# Patient Record
Sex: MALE | Race: BLACK OR AFRICAN AMERICAN | NOT HISPANIC OR LATINO | URBAN - METROPOLITAN AREA
[De-identification: names, ages, dates, MRNs, and addresses within clinical notes are randomized per-mention and may not be internally consistent; named-entity substitution may affect disease eponyms.]

---

## 2024-07-24 ENCOUNTER — HOSPITAL ENCOUNTER (EMERGENCY)
Facility: HOSPITAL | Age: 54
Discharge: ANOTHER HOSPITAL | End: 2024-07-24
Attending: STUDENT IN AN ORGANIZED HEALTH CARE EDUCATION/TRAINING PROGRAM

## 2024-07-24 VITALS
OXYGEN SATURATION: 99 % | TEMPERATURE: 98 F | HEART RATE: 64 BPM | SYSTOLIC BLOOD PRESSURE: 156 MMHG | DIASTOLIC BLOOD PRESSURE: 112 MMHG | WEIGHT: 246.25 LBS | RESPIRATION RATE: 23 BRPM | HEIGHT: 71 IN | BODY MASS INDEX: 34.48 KG/M2

## 2024-07-24 DIAGNOSIS — I47.10 SVT (SUPRAVENTRICULAR TACHYCARDIA): ICD-10-CM

## 2024-07-24 DIAGNOSIS — I49.01 VENTRICULAR FIBRILLATION: ICD-10-CM

## 2024-07-24 DIAGNOSIS — R07.9 CHEST PAIN: Primary | ICD-10-CM

## 2024-07-24 LAB
ALBUMIN SERPL-MCNC: 4 G/DL (ref 3.5–5)
ALBUMIN/GLOB SERPL: 1.1 RATIO (ref 1.1–2)
ALP SERPL-CCNC: 99 UNIT/L (ref 40–150)
ALT SERPL-CCNC: 32 UNIT/L (ref 0–55)
ANION GAP SERPL CALC-SCNC: 14 MEQ/L
AST SERPL-CCNC: 42 UNIT/L (ref 5–34)
BACTERIA #/AREA URNS AUTO: ABNORMAL /HPF
BASOPHILS # BLD AUTO: 0.04 X10(3)/MCL
BASOPHILS NFR BLD AUTO: 0.7 %
BILIRUB SERPL-MCNC: 0.7 MG/DL
BILIRUB UR QL STRIP.AUTO: NEGATIVE
BUN SERPL-MCNC: 15.4 MG/DL (ref 8.4–25.7)
CALCIUM SERPL-MCNC: 9.7 MG/DL (ref 8.4–10.2)
CHLORIDE SERPL-SCNC: 105 MMOL/L (ref 98–107)
CLARITY UR: CLEAR
CO2 SERPL-SCNC: 20 MMOL/L (ref 22–29)
COLOR UR AUTO: ABNORMAL
CREAT SERPL-MCNC: 1.47 MG/DL (ref 0.73–1.18)
CREAT/UREA NIT SERPL: 10
EOSINOPHIL # BLD AUTO: 0.3 X10(3)/MCL (ref 0–0.9)
EOSINOPHIL NFR BLD AUTO: 5 %
ERYTHROCYTE [DISTWIDTH] IN BLOOD BY AUTOMATED COUNT: 13.4 % (ref 11.5–17)
GFR SERPLBLD CREATININE-BSD FMLA CKD-EPI: 56 ML/MIN/1.73/M2
GLOBULIN SER-MCNC: 3.8 GM/DL (ref 2.4–3.5)
GLUCOSE SERPL-MCNC: 135 MG/DL (ref 74–100)
GLUCOSE UR QL STRIP: NORMAL
HCT VFR BLD AUTO: 53.6 % (ref 42–52)
HGB BLD-MCNC: 17.6 G/DL (ref 14–18)
HGB UR QL STRIP: ABNORMAL
HOLD SPECIMEN: NORMAL
HOLD SPECIMEN: NORMAL
HYALINE CASTS #/AREA URNS LPF: ABNORMAL /LPF
IMM GRANULOCYTES # BLD AUTO: 0.03 X10(3)/MCL (ref 0–0.04)
IMM GRANULOCYTES NFR BLD AUTO: 0.5 %
INR PPP: 1
KETONES UR QL STRIP: NEGATIVE
LACTATE SERPL-SCNC: 1.9 MMOL/L (ref 0.5–2.2)
LEUKOCYTE ESTERASE UR QL STRIP: NEGATIVE
LYMPHOCYTES # BLD AUTO: 2.51 X10(3)/MCL (ref 0.6–4.6)
LYMPHOCYTES NFR BLD AUTO: 41.8 %
MAGNESIUM SERPL-MCNC: 2.2 MG/DL (ref 1.6–2.6)
MCH RBC QN AUTO: 30 PG (ref 27–31)
MCHC RBC AUTO-ENTMCNC: 32.8 G/DL (ref 33–36)
MCV RBC AUTO: 91.5 FL (ref 80–94)
MONOCYTES # BLD AUTO: 0.7 X10(3)/MCL (ref 0.1–1.3)
MONOCYTES NFR BLD AUTO: 11.7 %
MUCOUS THREADS URNS QL MICRO: ABNORMAL /LPF
NEUTROPHILS # BLD AUTO: 2.42 X10(3)/MCL (ref 2.1–9.2)
NEUTROPHILS NFR BLD AUTO: 40.3 %
NITRITE UR QL STRIP: NEGATIVE
NRBC BLD AUTO-RTO: 0 %
PH UR STRIP: 5.5 [PH]
PLATELET # BLD AUTO: 270 X10(3)/MCL (ref 130–400)
PMV BLD AUTO: 10.5 FL (ref 7.4–10.4)
POTASSIUM SERPL-SCNC: 3.8 MMOL/L (ref 3.5–5.1)
PROT SERPL-MCNC: 7.8 GM/DL (ref 6.4–8.3)
PROT UR QL STRIP: ABNORMAL
PROTHROMBIN TIME: 13.7 SECONDS (ref 11.4–14)
RBC # BLD AUTO: 5.86 X10(6)/MCL (ref 4.7–6.1)
RBC #/AREA URNS AUTO: ABNORMAL /HPF
SODIUM SERPL-SCNC: 139 MMOL/L (ref 136–145)
SP GR UR STRIP.AUTO: 1.02 (ref 1–1.03)
SQUAMOUS #/AREA URNS LPF: ABNORMAL /HPF
TROPONIN I SERPL-MCNC: <0.01 NG/ML (ref 0–0.04)
TSH SERPL-ACNC: 0.64 UIU/ML (ref 0.35–4.94)
UROBILINOGEN UR STRIP-ACNC: NORMAL
WBC # BLD AUTO: 6 X10(3)/MCL (ref 4.5–11.5)
WBC #/AREA URNS AUTO: ABNORMAL /HPF

## 2024-07-24 PROCEDURE — 83735 ASSAY OF MAGNESIUM: CPT | Performed by: STUDENT IN AN ORGANIZED HEALTH CARE EDUCATION/TRAINING PROGRAM

## 2024-07-24 PROCEDURE — 84443 ASSAY THYROID STIM HORMONE: CPT | Performed by: STUDENT IN AN ORGANIZED HEALTH CARE EDUCATION/TRAINING PROGRAM

## 2024-07-24 PROCEDURE — 83605 ASSAY OF LACTIC ACID: CPT | Performed by: STUDENT IN AN ORGANIZED HEALTH CARE EDUCATION/TRAINING PROGRAM

## 2024-07-24 PROCEDURE — 99291 CRITICAL CARE FIRST HOUR: CPT | Mod: 25

## 2024-07-24 PROCEDURE — 81001 URINALYSIS AUTO W/SCOPE: CPT | Performed by: STUDENT IN AN ORGANIZED HEALTH CARE EDUCATION/TRAINING PROGRAM

## 2024-07-24 PROCEDURE — 80053 COMPREHEN METABOLIC PANEL: CPT | Performed by: STUDENT IN AN ORGANIZED HEALTH CARE EDUCATION/TRAINING PROGRAM

## 2024-07-24 PROCEDURE — 84484 ASSAY OF TROPONIN QUANT: CPT | Performed by: STUDENT IN AN ORGANIZED HEALTH CARE EDUCATION/TRAINING PROGRAM

## 2024-07-24 PROCEDURE — 85025 COMPLETE CBC W/AUTO DIFF WBC: CPT | Performed by: STUDENT IN AN ORGANIZED HEALTH CARE EDUCATION/TRAINING PROGRAM

## 2024-07-24 PROCEDURE — 85610 PROTHROMBIN TIME: CPT | Performed by: STUDENT IN AN ORGANIZED HEALTH CARE EDUCATION/TRAINING PROGRAM

## 2024-07-24 PROCEDURE — 63600175 PHARM REV CODE 636 W HCPCS

## 2024-07-24 PROCEDURE — 96361 HYDRATE IV INFUSION ADD-ON: CPT | Mod: 59

## 2024-07-24 PROCEDURE — 92960 CARDIOVERSION ELECTRIC EXT: CPT | Mod: 59

## 2024-07-24 PROCEDURE — 93005 ELECTROCARDIOGRAM TRACING: CPT

## 2024-07-24 PROCEDURE — 63600175 PHARM REV CODE 636 W HCPCS: Performed by: STUDENT IN AN ORGANIZED HEALTH CARE EDUCATION/TRAINING PROGRAM

## 2024-07-24 PROCEDURE — 96374 THER/PROPH/DIAG INJ IV PUSH: CPT | Mod: 59

## 2024-07-24 PROCEDURE — 25000003 PHARM REV CODE 250: Performed by: STUDENT IN AN ORGANIZED HEALTH CARE EDUCATION/TRAINING PROGRAM

## 2024-07-24 RX ORDER — FENTANYL CITRATE 50 UG/ML
INJECTION, SOLUTION INTRAMUSCULAR; INTRAVENOUS
Status: DISCONTINUED
Start: 2024-07-24 | End: 2024-07-24 | Stop reason: HOSPADM

## 2024-07-24 RX ORDER — ASPIRIN 81 MG
81 TABLET,CHEWABLE ORAL
COMMUNITY
Start: 2024-04-16

## 2024-07-24 RX ORDER — CLOPIDOGREL BISULFATE 75 MG/1
75 TABLET ORAL DAILY
COMMUNITY
Start: 2024-04-16

## 2024-07-24 RX ORDER — ADENOSINE 3 MG/ML
INJECTION, SOLUTION INTRAVENOUS
Status: COMPLETED
Start: 2024-07-24 | End: 2024-07-24

## 2024-07-24 RX ORDER — ADENOSINE 3 MG/ML
6 INJECTION, SOLUTION INTRAVENOUS
Status: DISCONTINUED | OUTPATIENT
Start: 2024-07-24 | End: 2024-07-24 | Stop reason: HOSPADM

## 2024-07-24 RX ORDER — AMIODARONE HYDROCHLORIDE 150 MG/3ML
150 INJECTION, SOLUTION INTRAVENOUS
Status: COMPLETED | OUTPATIENT
Start: 2024-07-24 | End: 2024-07-24

## 2024-07-24 RX ORDER — ROSUVASTATIN CALCIUM 10 MG/1
10 TABLET, COATED ORAL DAILY
COMMUNITY
Start: 2024-04-16

## 2024-07-24 RX ORDER — LOSARTAN POTASSIUM AND HYDROCHLOROTHIAZIDE 25; 100 MG/1; MG/1
1 TABLET ORAL DAILY
COMMUNITY
Start: 2024-04-16

## 2024-07-24 RX ORDER — ADENOSINE 3 MG/ML
INJECTION, SOLUTION INTRAVENOUS
Status: DISCONTINUED
Start: 2024-07-24 | End: 2024-07-24 | Stop reason: HOSPADM

## 2024-07-24 RX ORDER — AMLODIPINE BESYLATE 10 MG/1
10 TABLET ORAL DAILY
COMMUNITY
Start: 2024-04-16

## 2024-07-24 RX ORDER — AMIODARONE HYDROCHLORIDE 200 MG/1
200 TABLET ORAL EVERY MORNING
COMMUNITY
Start: 2024-04-16

## 2024-07-24 RX ADMIN — SODIUM CHLORIDE 500 ML: 9 INJECTION, SOLUTION INTRAVENOUS at 05:07

## 2024-07-24 RX ADMIN — AMIODARONE HYDROCHLORIDE 1 MG/MIN: 1.8 INJECTION, SOLUTION INTRAVENOUS at 05:07

## 2024-07-24 RX ADMIN — AMIODARONE HYDROCHLORIDE 150 MG: 50 INJECTION, SOLUTION INTRAVENOUS at 05:07

## 2024-07-24 RX ADMIN — ADENOSINE 6 MG: 3 INJECTION, SOLUTION INTRAVENOUS at 05:07

## 2024-07-24 NOTE — CONSULTS
[facility] Medicine Wards Consult Note     Chief Complaint     Chest Pain (Pt with hx of MI presents diaphoretic with complaints of chest pain that started 30 minutes prior to arrival. EKG obtained showing HR >200. Pt bedded immediately, MD notified and at bedside. )      Subjective:      History of Present Illness:  Eliseo Denson is a 54 y.o.  male who with a history of CAD s/p stents 2/2 massive heart attack who presented to [facility] ED on 7/24/2024  with a primary complaint of Chest Pain (Pt with hx of MI presents diaphoretic with complaints of chest pain that started 30 minutes prior to arrival. EKG obtained showing HR >200. Pt bedded immediately, MD notified and at bedside. )    Patient has a history of a massive heart attack per patient.  Reports that he was getting out of work approximately 30 minutes prior to arrival when he suddenly felt his heart racing and associated left chest pain which he described as pressure-like and shortness of breath.  Absolute lasted for approximately 3-4 minutes before patient sat down and rested a bit.  While he was resting he still felt palpitations but the chest pain somewhat improved.  After resting for a couple minutes patient stood up to attempt to get to his car and symptoms started all over again.  At this point patient called his wife to bring him to [facility] ED. upon arrival to the ED patient was still experiencing symptoms and suddenly went into SVT with a rate of 260.  Patient received adenosine and subsequently became hypotensive with unstable vitals requiring cardioversion.  Almost immediately after cardioversion patient went into ventricular fibrillation and underwent approximately 10 seconds of CPR before ROSC.  Patient was started on an amiodarone drip and after this episode patient was vitally stable.  ED physician consulted Medicine for evaluation of whether patient can be safely admitted to this facility or if he needs to be transferred out.  Due to patient's history  as well as his sudden symptoms it was decided that for patient's safety he would benefit from being in a facility that has cardiovascular services overnight which our facility does not.  This was discussed with staff who agreed.      Past Medical History:  No past medical history on file.    Past Surgical History:  No past surgical history on file.    Family History:  No family history on file.    Social History:       Allergies:  Review of patient's allergies indicates:  Not on File    Home Medications:  Prior to Admission medications    Not on File         Review of Systems:  Review of Systems   Constitutional:  Negative for chills, diaphoresis and fever.   Respiratory:  Positive for shortness of breath.    Cardiovascular:  Positive for chest pain and palpitations.   Gastrointestinal:  Negative for constipation, diarrhea, nausea and vomiting.              Objective:   Last 24 Hour Vital Signs:  BP  Min: 123/98  Max: 142/91  Temp  Av °F (36.7 °C)  Min: 98 °F (36.7 °C)  Max: 98 °F (36.7 °C)  Pulse  Av.4  Min: 67  Max: 141  Resp  Av.9  Min: 16  Max: 33  SpO2  Av.4 %  Min: 97 %  Max: 100 %  Weight  Av.7 kg (246 lb 4.1 oz)  Min: 111.7 kg (246 lb 4.1 oz)  Max: 111.7 kg (246 lb 4.1 oz)  There is no height or weight on file to calculate BMI.  No intake/output data recorded.    Physical Examination:  Physical Exam  Gen gerardo: NAD  HEENT: EOMI, PERRL  CV: RRR  Resp: Clr breath sounds b/l, no increased work of breathing  Abdomen: Soft, Nontender, nondistended, no guarding, no rebound tenderness  Extremities: No edema  Neuro: GCS 15      Laboratory:  Most Recent Data:  CBC:   Lab Results   Component Value Date    WBC 6.00 2024    HGB 17.6 2024    HCT 53.6 (H) 2024     2024    MCV 91.5 2024    RDW 13.4 2024     WBC Differential:   Recent Labs   Lab 24  1806   WBC 6.00   HGB 17.6   HCT 53.6*      MCV 91.5       Trended Lab Data:  Recent Labs   Lab  "07/24/24  1806   WBC 6.00   HGB 17.6   HCT 53.6*      MCV 91.5   RDW 13.4       Trended Cardiac Data:  No results for input(s): "TROPONINI", "CKTOTAL", "CKMB", "BNP" in the last 168 hours.    Microbiology Data:  Microbiology Results (last 7 days)       ** No results found for the last 168 hours. **               Radiology:  Imaging Results              X-Ray Chest AP Portable (Final result)  Result time 07/24/24 17:41:09      Final result by Harry Layne MD (07/24/24 17:41:09)                   Impression:      No acute cardiopulmonary process identified.      Electronically signed by: Harry Layne  Date:    07/24/2024  Time:    17:41               Narrative:    EXAMINATION:  XR CHEST AP PORTABLE    CLINICAL HISTORY:  CHest pain;    TECHNIQUE:  One view    COMPARISON:  July 12, 2010.    FINDINGS:  Cardiopericardial silhouette appearance is similar.  Lungs are without dense focal or segmental consolidation, congestive process, pleural effusions or pneumothorax.                                           Assessment & Plan:     54-year-old male with a PMH of CAD status post stent after having suffered a massive MI per patient    -patient presented with concerning symptoms of shortness of breath, chest pain, severe palpitations  -became vitally unstable after receiving adenosine due to SVT with a rate of 260  -underwent cardioversion and converted into ventricular fibrillation and subsequently underwent 10 seconds of CPR prior to ROSC  -discussed patient with staff who agreed that patient would benefit from transfer to facility for higher level of care due to cardio vascular Services not being available at this facility in the event that patient has another cardiac event  -discussed with ED physician the reasoning for transfer and physician agreed patient will benefit from being transferred for higher level of care and Cardiovascular Services    Quinton Cao MD  [facility] HO-2          "

## 2024-07-24 NOTE — ED PROVIDER NOTES
Encounter Date: 7/24/2024       History     Chief Complaint   Patient presents with    Chest Pain     Pt with hx of MI presents diaphoretic with complaints of chest pain that started 30 minutes prior to arrival. EKG obtained showing HR >200. Pt bedded immediately, MD notified and at bedside.      Patient presents to the emergency department due to chest pain.  He reports it started 30 minutes prior to arrival.  Describes it as a heaviness and tightness in his chest and he also feels dizzy and short of breath.  Has a history of MI with stents in place.  Denies a history arrhythmias.    The history is provided by the patient.     Review of patient's allergies indicates:  Not on File  No past medical history on file.  No past surgical history on file.  No family history on file.     Review of Systems   Unable to perform ROS: Acuity of condition       Physical Exam     Initial Vitals   BP Pulse Resp Temp SpO2   07/24/24 1719 07/24/24 1713 07/24/24 1717 07/24/24 1748 07/24/24 1725   (!) 66/50 (!) 180 (!) 33 98 °F (36.7 °C) 100 %      MAP       --                Physical Exam    Nursing note and vitals reviewed.  Constitutional: He appears well-developed and well-nourished. He is diaphoretic. He appears distressed.   HENT:   Head: Normocephalic and atraumatic.   Eyes: Conjunctivae are normal. Pupils are equal, round, and reactive to light.   Neck: Neck supple.   Normal range of motion.  Cardiovascular:            Extremely tachycardic   Pulmonary/Chest: Breath sounds normal. No respiratory distress. He has no wheezes.   Abdominal: Abdomen is soft. There is no abdominal tenderness.   Musculoskeletal:         General: No edema. Normal range of motion.      Cervical back: Normal range of motion and neck supple.     Neurological: He is alert and oriented to person, place, and time.   Skin: Skin is warm.         ED Course   Cardioversion    Date/Time: 7/24/2024 6:08 PM  Location procedure was performed: [facility]    Performed by: Jesus Freeman MD  Authorized by: Jesus Freeman MD  Consent Done: Emergent Situation    Patient sedated: no  Cardioversion basis: emergent  Pre-procedure rhythm: supraventricular tachycardia  Patient position: patient was placed in a supine position  Chest area: chest area exposed  Electrodes: pads  Electrodes placed: anterior-lateral  Number of attempts: 1  Attempt 1 mode: synchronous  Attempt 1 shock (in Joules): 150  Attempt 1 outcome: conversion to ventricular fibrillation  Attempt 2 mode: synchronous  Attempt 2 shock (in Joules): 200  Attempt 2 outcome: conversion to normal sinus rhythm  Post-procedure rhythm: normal sinus rhythm  Complications: subsequent arrhythmia  Complications: No  Specimens: No  Implants: No      Critical Care    Date/Time: 7/24/2024 8:49 PM    Performed by: Jesus Freeman MD  Authorized by: Jesus Freeman MD  Direct patient critical care time: 16 minutes  Additional history critical care time: 5 minutes  Ordering / reviewing critical care time: 5 minutes  Documentation critical care time: 6 minutes  Consulting other physicians critical care time: 5 minutes  Consult with family critical care time: 0 minutes  Other critical care time: 0 minutes  Total critical care time (exclusive of procedural time) : 37 minutes  Critical care time was exclusive of separately billable procedures and treating other patients and teaching time.  Critical care was necessary to treat or prevent imminent or life-threatening deterioration of the following conditions: cardiac failure.  Critical care was time spent personally by me on the following activities: blood draw for specimens, development of treatment plan with patient or surrogate, discussions with consultants, interpretation of cardiac output measurements, evaluation of patient's response to treatment, examination of patient, obtaining history from patient or surrogate, ordering and performing treatments and  interventions, ordering and review of laboratory studies, ordering and review of radiographic studies, pulse oximetry and re-evaluation of patient's condition.        Labs Reviewed   COMPREHENSIVE METABOLIC PANEL - Abnormal       Result Value    Sodium 139      Potassium 3.8      Chloride 105      CO2 20 (*)     Glucose 135 (*)     Blood Urea Nitrogen 15.4      Creatinine 1.47 (*)     Calcium 9.7      Protein Total 7.8      Albumin 4.0      Globulin 3.8 (*)     Albumin/Globulin Ratio 1.1      Bilirubin Total 0.7      ALP 99      ALT 32      AST 42 (*)     eGFR 56      Anion Gap 14.0      BUN/Creatinine Ratio 10     URINALYSIS, REFLEX TO URINE CULTURE - Abnormal    Color, UA Light-Yellow      Appearance, UA Clear      Specific Gravity, UA 1.019      pH, UA 5.5      Protein, UA 2+ (*)     Glucose, UA Normal      Ketones, UA Negative      Blood, UA Trace (*)     Bilirubin, UA Negative      Urobilinogen, UA Normal      Nitrites, UA Negative      Leukocyte Esterase, UA Negative      RBC, UA 0-5      WBC, UA 0-5      Bacteria, UA None Seen      Squamous Epithelial Cells, UA None Seen      Mucous, UA Trace (*)     Hyaline Casts, UA 0-2 (*)    CBC WITH DIFFERENTIAL - Abnormal    WBC 6.00      RBC 5.86      Hgb 17.6      Hct 53.6 (*)     MCV 91.5      MCH 30.0      MCHC 32.8 (*)     RDW 13.4      Platelet 270      MPV 10.5 (*)     Neut % 40.3      Lymph % 41.8      Mono % 11.7      Eos % 5.0      Basophil % 0.7      Lymph # 2.51      Neut # 2.42      Mono # 0.70      Eos # 0.30      Baso # 0.04      IG# 0.03      IG% 0.5      NRBC% 0.0     MAGNESIUM - Normal    Magnesium Level 2.20     LACTIC ACID, PLASMA - Normal    Lactic Acid Level 1.9     TROPONIN I - Normal    Troponin-I <0.010     TSH - Normal    TSH 0.636     PROTIME-INR - Normal    PT 13.7      INR 1.0     CBC W/ AUTO DIFFERENTIAL    Narrative:     The following orders were created for panel order CBC auto differential.  Procedure                                Abnormality         Status                     ---------                               -----------         ------                     CBC with Differential[[id number]]       Abnormal            Final result                 Please view results for these tests on the individual orders.   EXTRA TUBES    Narrative:     The following orders were created for panel order EXTRA TUBES.  Procedure                               Abnormality         Status                     ---------                               -----------         ------                     Red Top Hold[[id number]]                                    Final result               Gold Top Hold[[id number]]                                   Final result                 Please view results for these tests on the individual orders.   RED TOP HOLD    Extra Tube Hold for add-ons.     GOLD TOP HOLD    Extra Tube Hold for add-ons.       EKG Readings: (Independently Interpreted)   Initial Reading: No STEMI. Rhythm: Supraventricular Tachycardia (SVT). Heart Rate: 274. Ectopy: No Ectopy. Conduction: Normal. Axis: Left Axis Deviation.     ECG Results              EKG 12-lead (Chest Pain) Age >30 (In process)        Collection Time Result Time QRS Duration [facility] QTC Calculation    07/24/24 17:23:43 07/24/24 17:55:30 112 446                     In process by Interface, Lab In [facility] (07/24/24 17:55:40)                   Narrative:    Test Reason : R07.9,    Vent. Rate : 074 BPM     Atrial Rate : 074 BPM     P-R Int : 198 ms          QRS Dur : 112 ms      QT Int : 402 ms       P-R-T Axes : -05 -77 087 degrees     QTc Int : 446 ms    Sinus rhythm with frequent Premature ventricular complexes  Left anterior fascicular block  Possible Anterior infarct (cited on or before 24-JUL-2024)  ST and T wave abnormality, consider lateral ischemia  Abnormal ECG  When compared with ECG of 24-JUL-2024 17:07,  Significant changes have occurred    Referred By:             Confirmed By:                        In process by Interface, Lab In [facility] (07/24/24 17:55:12)                   Narrative:    Test Reason : R07.9,    Vent. Rate : 074 BPM     Atrial Rate : 074 BPM     P-R Int : 198 ms          QRS Dur : 112 ms      QT Int : 402 ms       P-R-T Axes : -05 -77 087 degrees     QTc Int : 446 ms    Sinus rhythm with frequent Premature ventricular complexes  Left anterior fascicular block  Possible Anterior infarct (cited on or before 24-JUL-2024)  ST and T wave abnormality, consider lateral ischemia  Abnormal ECG  When compared with ECG of 24-JUL-2024 17:07,  Significant changes have occurred    Referred By:             Confirmed By:                                      EKG 12-lead (In process)        Collection Time Result Time QRS Duration [facility]HS QTC Calculation    07/24/24 17:07:55 07/24/24 17:55:27 82 380                     In process by Interface, Lab In [facility] (07/24/24 17:55:32)                   Narrative:    Test Reason : R07.9,    Vent. Rate : 274 BPM     Atrial Rate : 277 BPM     P-R Int : 000 ms          QRS Dur : 082 ms      QT Int : 178 ms       P-R-T Axes : 000 -60 129 degrees     QTc Int : 380 ms    Supraventricular tachycardia  Left axis deviation  LVH with repolarization abnormality  Inferior infarct ,age undetermined  Anteroseptal infarct ,age undetermined  Abnormal ECG  No previous ECGs available    Referred By:             Confirmed By:                       In process by Interface, Lab In [facility] (07/24/24 17:54:56)                   Narrative:    Test Reason : R07.9,    Vent. Rate : 274 BPM     Atrial Rate : 277 BPM     P-R Int : 000 ms          QRS Dur : 082 ms      QT Int : 178 ms       P-R-T Axes : 000 -60 129 degrees     QTc Int : 380 ms    Supraventricular tachycardia  Left axis deviation  LVH with repolarization abnormality  Inferior infarct ,age undetermined  Anteroseptal infarct ,age undetermined  Abnormal ECG  No previous ECGs available    Referred By:             Confirmed By:                                    Imaging Results              X-Ray Chest AP Portable (Final result)  Result time 07/24/24 17:41:09      Final result by Harry Layne MD (07/24/24 17:41:09)                   Impression:      No acute cardiopulmonary process identified.      Electronically signed by: Harry Layne  Date:    07/24/2024  Time:    17:41               Narrative:    EXAMINATION:  XR CHEST AP PORTABLE    CLINICAL HISTORY:  CHest pain;    TECHNIQUE:  One view    COMPARISON:  July 12, 2010.    FINDINGS:  Cardiopericardial silhouette appearance is similar.  Lungs are without dense focal or segmental consolidation, congestive process, pleural effusions or pneumothorax.                                       Medications   fentaNYL (SUBLIMAZE) 50 mcg/mL injection (  Not Given 7/24/24 1730)   amiodarone 360 mg/200 mL (1.8 mg/mL) infusion ( Intravenous Canceled Entry 7/24/24 1730)   adenosine injection 6 mg ( Intravenous Canceled Entry 7/24/24 1730)   adenosine (ADENOCARD) 3 mg/mL injection (  Canceled Entry 7/24/24 1730)   amiodarone 360 mg/200 mL (1.8 mg/mL) infusion (1 mg/min Intravenous New Bag 7/24/24 1725)   amiodarone injection 150 mg (150 mg Intravenous Given 7/24/24 1720)   sodium chloride 0.9% bolus (0 mLs Intravenous Stopped 7/24/24 1814)     Medical Decision Making  On arrival patient appeared to be in SVT per EKGs it was stable vital signs.  Initially tried 6 mg of adenosine, unsuccessful and shortly afterwards patient became hypotensive and lethargic.  Emergent synchronized cardioversion performed due to patient being unstable.  This resulted in VFib arrest, chest compressions were initiated, the patient was quickly defibrillated with 200 joules, resulting in normal sinus rhythm.  The patient returned to his mental baseline as well and blood pressure was stable.  He denies any symptoms after the event..  Repeat EKG shows sinus rhythm with no ST elevations.  CBC, CMP reassuring.  Mag within normal  limits.  Troponin within normal limits.  Chest x-ray is clear.  He received IV amiodarone bolus and was initiated on amiodarone drip.  Discussed the patient with Internal Medicine at our facility, Internal Medicine recommended transfer due to no cardiology services available overnight if he would need an emergent procedure.  Patient was accepted as a transfer by Dr. Alcocer to [facility].    Amount and/or Complexity of Data Reviewed  Labs: ordered. Decision-making details documented in ED Course.  Radiology: ordered. Decision-making details documented in ED Course.  ECG/medicine tests: ordered and independent interpretation performed. Decision-making details documented in ED Course.    Risk  Prescription drug management.                                      Clinical Impression:  Final diagnoses:  [R07.9] Chest pain (Primary)  [I47.10] SVT (supraventricular tachycardia)  [I49.01] Ventricular fibrillation          ED Disposition Condition    Transfer to Another Facility Jesus Ray MD  07/24/24 1124

## 2024-07-25 LAB
OHS QRS DURATION: 112 MS
OHS QRS DURATION: 82 MS
OHS QTC CALCULATION: 380 MS
OHS QTC CALCULATION: 446 MS

## 2024-08-23 ENCOUNTER — HOSPITAL ENCOUNTER (INPATIENT)
Facility: HOSPITAL | Age: 54
LOS: 1 days | Discharge: ANOTHER HOSPITAL | DRG: 314 | End: 2024-08-27
Attending: EMERGENCY MEDICINE | Admitting: INTERNAL MEDICINE

## 2024-08-23 DIAGNOSIS — I50.9 CONGESTIVE HEART FAILURE, UNSPECIFIED HF CHRONICITY, UNSPECIFIED HEART FAILURE TYPE: Primary | ICD-10-CM

## 2024-08-23 DIAGNOSIS — R07.9 CHEST PAIN: ICD-10-CM

## 2024-08-23 DIAGNOSIS — R06.02 SOB (SHORTNESS OF BREATH): ICD-10-CM

## 2024-08-23 DIAGNOSIS — I50.9 ACUTE EXACERBATION OF CHF (CONGESTIVE HEART FAILURE): ICD-10-CM

## 2024-08-23 DIAGNOSIS — R78.81 STAPHYLOCOCCUS AUREUS BACTEREMIA: ICD-10-CM

## 2024-08-23 DIAGNOSIS — I50.23 ACUTE ON CHRONIC SYSTOLIC CONGESTIVE HEART FAILURE: ICD-10-CM

## 2024-08-23 DIAGNOSIS — B95.61 STAPHYLOCOCCUS AUREUS BACTEREMIA: ICD-10-CM

## 2024-08-23 DIAGNOSIS — R06.00 DYSPNEA: ICD-10-CM

## 2024-08-23 LAB
A-ADO2 BLOOD GAS (OHS): 109 MMHG
ACINETOBACTER CALCOACETICUS-BAUMANNII COMPLEX (OHS): NOT DETECTED
ALBUMIN SERPL-MCNC: 3.1 G/DL (ref 3.5–5)
ALBUMIN/GLOB SERPL: 0.8 RATIO (ref 1.1–2)
ALLENS TEST BLOOD GAS (OHS): YES
ALP SERPL-CCNC: 91 UNIT/L (ref 40–150)
ALT SERPL-CCNC: 23 UNIT/L (ref 0–55)
AMPHET UR QL SCN: NEGATIVE
ANION GAP SERPL CALC-SCNC: 8 MEQ/L
APICAL FOUR CHAMBER EJECTION FRACTION: 33 %
APICAL TWO CHAMBER EJECTION FRACTION: 18 %
AST SERPL-CCNC: 19 UNIT/L (ref 5–34)
AV INDEX (PROSTH): 0.73
AV MEAN GRADIENT: 3 MMHG
AV PEAK GRADIENT: 6 MMHG
AV VALVE AREA BY VELOCITY RATIO: 2.9 CM²
AV VALVE AREA: 2.95 CM²
AV VELOCITY RATIO: 0.72
B PERT.PT PRMT NPH QL NAA+NON-PROBE: NOT DETECTED
BACTEROIDES FRAGILIS (OHS): NOT DETECTED
BARBITURATE SCN PRESENT UR: NEGATIVE
BASE EXCESS BLD CALC-SCNC: 0.5 MMOL/L (ref -2–2)
BASOPHILS # BLD AUTO: 0.02 X10(3)/MCL
BASOPHILS NFR BLD AUTO: 0.2 %
BENZODIAZ UR QL SCN: NEGATIVE
BILIRUB SERPL-MCNC: 1 MG/DL
BLOOD GAS SAMPLE TYPE (OHS): ABNORMAL
BNP BLD-MCNC: 962.2 PG/ML
BSA FOR ECHO PROCEDURE: 2.26 M2
BUN SERPL-MCNC: 11.5 MG/DL (ref 8.4–25.7)
C AURIS DNA BLD POS QL NAA+NON-PROBE: NOT DETECTED
C GATTII+NEOFOR DNA CSF QL NAA+NON-PROBE: NOT DETECTED
C PNEUM DNA NPH QL NAA+NON-PROBE: NOT DETECTED
CALCIUM SERPL-MCNC: 9.4 MG/DL (ref 8.4–10.2)
CANDIDA ALBICANS (OHS): NOT DETECTED
CANDIDA GLABRATA (OHS): NOT DETECTED
CANDIDA KRUSEI (OHS): NOT DETECTED
CANDIDA PARAPSILOSIS (OHS): NOT DETECTED
CANDIDA TROPICALIS (OHS): NOT DETECTED
CANNABINOIDS UR QL SCN: POSITIVE
CHLORIDE SERPL-SCNC: 107 MMOL/L (ref 98–107)
CK MB SERPL-MCNC: <1 NG/ML
CK SERPL-CCNC: 108 U/L (ref 30–200)
CO2 BLDA-SCNC: 25.7 MMOL/L (ref 22–26)
CO2 SERPL-SCNC: 22 MMOL/L (ref 22–29)
COCAINE UR QL SCN: NEGATIVE
COHGB MFR BLDA: 2.7 % (ref 0.5–1.5)
CREAT SERPL-MCNC: 1 MG/DL (ref 0.73–1.18)
CREAT/UREA NIT SERPL: 12
CTX-M (OHS): ABNORMAL
CV ECHO LV RWT: 0.31 CM
D DIMER PPP IA.FEU-MCNC: 1.22 UG/ML FEU (ref 0–0.5)
DOP CALC AO PEAK VEL: 1.24 M/S
DOP CALC AO VTI: 21.1 CM
DOP CALC LVOT AREA: 4 CM2
DOP CALC LVOT DIAMETER: 2.27 CM
DOP CALC LVOT PEAK VEL: 0.89 M/S
DOP CALC LVOT STROKE VOLUME: 62.29 CM3
DOP CALC MV VTI: 17.8 CM
DOP CALCLVOT PEAK VEL VTI: 15.4 CM
DRAWN BY BLOOD GAS (OHS): ABNORMAL
E WAVE DECELERATION TIME: 192.62 MSEC
E/A RATIO: 0.69
E/E' RATIO: 12.29 M/S
ECHO LV POSTERIOR WALL: 1.07 CM (ref 0.6–1.1)
ENTEROBACTER CLOACAE COMPLEX (OHS): NOT DETECTED
ENTEROBACTERALES (OHS): NOT DETECTED
ENTEROCOCCUS FAECALIS (OHS): NOT DETECTED
ENTEROCOCCUS FAECIUM (OHS): NOT DETECTED
EOSINOPHIL # BLD AUTO: 0.04 X10(3)/MCL (ref 0–0.9)
EOSINOPHIL NFR BLD AUTO: 0.3 %
ERYTHROCYTE [DISTWIDTH] IN BLOOD BY AUTOMATED COUNT: 13.4 % (ref 11.5–17)
ESCHERICHIA COLI (OHS): NOT DETECTED
EST. AVERAGE GLUCOSE BLD GHB EST-MCNC: 116.9 MG/DL
FENTANYL UR QL SCN: NEGATIVE
FLUAV AG UPPER RESP QL IA.RAPID: NOT DETECTED
FLUAV AG UPPER RESP QL IA.RAPID: NOT DETECTED
FLUBV AG UPPER RESP QL IA.RAPID: NOT DETECTED
FLUBV AG UPPER RESP QL IA.RAPID: NOT DETECTED
FRACTIONAL SHORTENING: 13 % (ref 28–44)
GAS PNL BLD: 182 MMHG
GFR SERPLBLD CREATININE-BSD FMLA CKD-EPI: >60 ML/MIN/1.73/M2
GLOBULIN SER-MCNC: 3.7 GM/DL (ref 2.4–3.5)
GLUCOSE SERPL-MCNC: 163 MG/DL (ref 74–100)
GP B STREP DNA CSF QL NAA+NON-PROBE: NOT DETECTED
HADV DNA NPH QL NAA+NON-PROBE: NOT DETECTED
HAEM INFLU DNA CSF QL NAA+NON-PROBE: NOT DETECTED
HBA1C MFR BLD: 5.7 %
HCO3 BLDA-SCNC: 24.6 MMOL/L (ref 22–26)
HCOV 229E RNA NPH QL NAA+NON-PROBE: NOT DETECTED
HCOV HKU1 RNA NPH QL NAA+NON-PROBE: NOT DETECTED
HCOV NL63 RNA NPH QL NAA+NON-PROBE: NOT DETECTED
HCOV OC43 RNA NPH QL NAA+NON-PROBE: NOT DETECTED
HCT VFR BLD AUTO: 42.1 % (ref 42–52)
HGB BLD-MCNC: 14.5 G/DL (ref 14–18)
HMPV RNA NPH QL NAA+NON-PROBE: NOT DETECTED
HOLD SPECIMEN: NORMAL
HPIV1 RNA NPH QL NAA+NON-PROBE: NOT DETECTED
HPIV2 RNA NPH QL NAA+NON-PROBE: NOT DETECTED
HPIV3 RNA NPH QL NAA+NON-PROBE: NOT DETECTED
HPIV4 RNA NPH QL NAA+NON-PROBE: NOT DETECTED
HR MV ECHO: 65 BPM
IMM GRANULOCYTES # BLD AUTO: 0.04 X10(3)/MCL (ref 0–0.04)
IMM GRANULOCYTES NFR BLD AUTO: 0.3 %
IMP (OHS): ABNORMAL
INHALED O2 CONCENTRATION: 32 %
INTERVENTRICULAR SEPTUM: 1.17 CM (ref 0.6–1.1)
KLEBSIELLA AEROGENES (OHS): NOT DETECTED
KLEBSIELLA OXYTOCA (OHS): NOT DETECTED
KLEBSIELLA PNEUMONIAE GROUP (OHS): NOT DETECTED
KPC (OHS): ABNORMAL
L MONOCYTOG DNA CSF QL NAA+NON-PROBE: NOT DETECTED
LACTATE SERPL-SCNC: 1.1 MMOL/L (ref 0.5–2.2)
LEFT ATRIUM SIZE: 4.5 CM
LEFT INTERNAL DIMENSION IN SYSTOLE: 5.94 CM (ref 2.1–4)
LEFT VENTRICLE DIASTOLIC VOLUME INDEX: 110 ML/M2
LEFT VENTRICLE DIASTOLIC VOLUME: 244.21 ML
LEFT VENTRICLE END DIASTOLIC VOLUME APICAL 2 CHAMBER: 147 ML
LEFT VENTRICLE END DIASTOLIC VOLUME APICAL 4 CHAMBER: 185.39 ML
LEFT VENTRICLE MASS INDEX: 162 G/M2
LEFT VENTRICLE SYSTOLIC VOLUME INDEX: 79.1 ML/M2
LEFT VENTRICLE SYSTOLIC VOLUME: 175.59 ML
LEFT VENTRICULAR INTERNAL DIMENSION IN DIASTOLE: 6.86 CM (ref 3.5–6)
LEFT VENTRICULAR MASS: 358.98 G
LPM (OHS): 3
LV LATERAL E/E' RATIO: 14.33 M/S
LV SEPTAL E/E' RATIO: 10.75 M/S
LVED V (TEICH): 244.21 ML
LVES V (TEICH): 175.59 ML
LVOT MG: 1.83 MMHG
LVOT MV: 0.63 CM/S
LYMPHOCYTES # BLD AUTO: 0.57 X10(3)/MCL (ref 0.6–4.6)
LYMPHOCYTES NFR BLD AUTO: 4.9 %
M PNEUMO DNA NPH QL NAA+NON-PROBE: NOT DETECTED
MAGNESIUM SERPL-MCNC: 1.8 MG/DL (ref 1.6–2.6)
MCH RBC QN AUTO: 31.4 PG (ref 27–31)
MCHC RBC AUTO-ENTMCNC: 34.4 G/DL (ref 33–36)
MCR-1 (OHS): ABNORMAL
MCV RBC AUTO: 91.1 FL (ref 80–94)
MDMA UR QL SCN: NEGATIVE
MECA/C (OHS): ABNORMAL
MECA/C AND MREJ (MRSA)(OHS): NOT DETECTED
METHGB MFR BLDA: 1.2 % (ref 0–1.5)
MONOCYTES # BLD AUTO: 0.85 X10(3)/MCL (ref 0.1–1.3)
MONOCYTES NFR BLD AUTO: 7.3 %
MRSA PCR SCRN (OHS): NOT DETECTED
MV MEAN GRADIENT: 1 MMHG
MV PEAK A VEL: 0.62 M/S
MV PEAK E VEL: 0.43 M/S
MV PEAK GRADIENT: 1 MMHG
MV STENOSIS PRESSURE HALF TIME: 55.86 MS
MV VALVE AREA BY CONTINUITY EQUATION: 3.5 CM2
MV VALVE AREA P 1/2 METHOD: 3.94 CM2
N MEN DNA CSF QL NAA+NON-PROBE: NOT DETECTED
NDM (OHS): ABNORMAL
NEUTROPHILS # BLD AUTO: 10.05 X10(3)/MCL (ref 2.1–9.2)
NEUTROPHILS NFR BLD AUTO: 87 %
NRBC BLD AUTO-RTO: 0 %
O2 HB BLOOD GAS (OHS): 93.5 % (ref 94–100)
OHS CV RV/LV RATIO: 0.49 CM
OHS LV EJECTION FRACTION SIMPSONS BIPLANE MOD: 28 %
OHS QRS DURATION: 116 MS
OHS QTC CALCULATION: 440 MS
OPIATES UR QL SCN: NEGATIVE
OXA-48-LIKE (OHS): ABNORMAL
OXYGEN DEVICE BLOOD GAS (OHS): ABNORMAL
OXYHGB MFR BLDA: 14.7 G/DL (ref 12–18)
PCO2 BLDA: 37 MMHG (ref 35–45)
PCP UR QL: NEGATIVE
PH BLDA: 7.43 [PH] (ref 7.35–7.45)
PH UR: 5 [PH] (ref 3–11)
PISA TR MAX VEL: 1.5 M/S
PLATELET # BLD AUTO: 252 X10(3)/MCL (ref 130–400)
PMV BLD AUTO: 9.7 FL (ref 7.4–10.4)
PO2 BLDA: 73 MMHG (ref 75–100)
POCT GLUCOSE: 182 MG/DL (ref 70–110)
POTASSIUM SERPL-SCNC: 3.7 MMOL/L (ref 3.5–5.1)
PROT SERPL-MCNC: 6.8 GM/DL (ref 6.4–8.3)
PROTEUS SPP. (OHS): NOT DETECTED
PSEUDOMONAS AERUGINOSA (OHS): NOT DETECTED
RA PRESSURE ESTIMATED: 3 MMHG
RBC # BLD AUTO: 4.62 X10(6)/MCL (ref 4.7–6.1)
RIGHT VENTRICULAR END-DIASTOLIC DIMENSION: 3.34 CM
RSV A 5' UTR RNA NPH QL NAA+PROBE: NOT DETECTED
RSV RNA NPH QL NAA+NON-PROBE: NOT DETECTED
RV TB RVSP: 5 MMHG
RV+EV RNA NPH QL NAA+NON-PROBE: NOT DETECTED
S ENT+BONG DNA STL QL NAA+NON-PROBE: NOT DETECTED
S PNEUM DNA CSF QL NAA+NON-PROBE: NOT DETECTED
SAMPLE SITE BLOOD GAS (OHS): ABNORMAL
SAO2 % BLDA: 97.3 %
SARS-COV-2 RNA RESP QL NAA+PROBE: NOT DETECTED
SARS-COV-2 RNA RESP QL NAA+PROBE: NOT DETECTED
SERRATIA MARCESCENS (OHS): NOT DETECTED
SODIUM SERPL-SCNC: 137 MMOL/L (ref 136–145)
SPECIFIC GRAVITY, URINE AUTO (.000) (OHS): 1.02 (ref 1–1.03)
STAPHYLOCOCCUS AUREUS (OHS): DETECTED
STAPHYLOCOCCUS EPIDERMIDIS (OHS): NOT DETECTED
STAPHYLOCOCCUS LUGDUNENSIS (OHS): NOT DETECTED
STAPHYLOCOCCUS SPP. (OHS): DETECTED
STENOTROPHOMONAS MALTOPHILIA (OHS): NOT DETECTED
STREPTOCOCCUS PYOGENES (GROUP A)(OHS): NOT DETECTED
STREPTOCOCCUS SPP. (OHS): NOT DETECTED
T3FREE SERPL-MCNC: 2.19 PG/ML (ref 1.58–3.91)
T4 FREE SERPL-MCNC: 1.16 NG/DL (ref 0.7–1.48)
TDI LATERAL: 0.03 M/S
TDI SEPTAL: 0.04 M/S
TDI: 0.04 M/S
TR MAX PG: 9 MMHG
TRICUSPID ANNULAR PLANE SYSTOLIC EXCURSION: 2.64 CM
TROPONIN I SERPL-MCNC: 0.01 NG/ML (ref 0–0.04)
TROPONIN I SERPL-MCNC: 0.01 NG/ML (ref 0–0.04)
TROPONIN I SERPL-MCNC: <0.01 NG/ML (ref 0–0.04)
TSH SERPL-ACNC: 0.09 UIU/ML (ref 0.35–4.94)
TV REST PULMONARY ARTERY PRESSURE: 12 MMHG
VANA/B (OHS): ABNORMAL
VIM (OHS): ABNORMAL
WBC # BLD AUTO: 11.57 X10(3)/MCL (ref 4.5–11.5)
Z-SCORE OF LEFT VENTRICULAR DIMENSION IN END DIASTOLE: -1.22
Z-SCORE OF LEFT VENTRICULAR DIMENSION IN END SYSTOLE: 1.84

## 2024-08-23 PROCEDURE — 80307 DRUG TEST PRSMV CHEM ANLYZR: CPT

## 2024-08-23 PROCEDURE — 84484 ASSAY OF TROPONIN QUANT: CPT | Performed by: EMERGENCY MEDICINE

## 2024-08-23 PROCEDURE — 85379 FIBRIN DEGRADATION QUANT: CPT | Performed by: EMERGENCY MEDICINE

## 2024-08-23 PROCEDURE — 82553 CREATINE MB FRACTION: CPT | Performed by: EMERGENCY MEDICINE

## 2024-08-23 PROCEDURE — 36415 COLL VENOUS BLD VENIPUNCTURE: CPT | Mod: 91 | Performed by: INTERNAL MEDICINE

## 2024-08-23 PROCEDURE — 25500020 PHARM REV CODE 255

## 2024-08-23 PROCEDURE — 87184 SC STD DISK METHOD PER PLATE: CPT | Mod: 91 | Performed by: EMERGENCY MEDICINE

## 2024-08-23 PROCEDURE — 99900035 HC TECH TIME PER 15 MIN (STAT)

## 2024-08-23 PROCEDURE — 87641 MR-STAPH DNA AMP PROBE: CPT

## 2024-08-23 PROCEDURE — 84484 ASSAY OF TROPONIN QUANT: CPT | Mod: 91

## 2024-08-23 PROCEDURE — 84481 FREE ASSAY (FT-3): CPT

## 2024-08-23 PROCEDURE — 0240U COVID/FLU A&B PCR: CPT | Performed by: EMERGENCY MEDICINE

## 2024-08-23 PROCEDURE — 83735 ASSAY OF MAGNESIUM: CPT | Performed by: EMERGENCY MEDICINE

## 2024-08-23 PROCEDURE — 82803 BLOOD GASES ANY COMBINATION: CPT

## 2024-08-23 PROCEDURE — 80053 COMPREHEN METABOLIC PANEL: CPT | Performed by: EMERGENCY MEDICINE

## 2024-08-23 PROCEDURE — 25000003 PHARM REV CODE 250

## 2024-08-23 PROCEDURE — 36600 WITHDRAWAL OF ARTERIAL BLOOD: CPT

## 2024-08-23 PROCEDURE — 87581 M.PNEUMON DNA AMP PROBE: CPT

## 2024-08-23 PROCEDURE — 87798 DETECT AGENT NOS DNA AMP: CPT

## 2024-08-23 PROCEDURE — 63600175 PHARM REV CODE 636 W HCPCS

## 2024-08-23 PROCEDURE — 25000003 PHARM REV CODE 250: Performed by: EMERGENCY MEDICINE

## 2024-08-23 PROCEDURE — 87077 CULTURE AEROBIC IDENTIFY: CPT | Performed by: EMERGENCY MEDICINE

## 2024-08-23 PROCEDURE — 85025 COMPLETE CBC W/AUTO DIFF WBC: CPT | Performed by: EMERGENCY MEDICINE

## 2024-08-23 PROCEDURE — 0241U COVID/RSV/FLU A&B PCR: CPT

## 2024-08-23 PROCEDURE — G0378 HOSPITAL OBSERVATION PER HR: HCPCS

## 2024-08-23 PROCEDURE — 63600175 PHARM REV CODE 636 W HCPCS: Performed by: EMERGENCY MEDICINE

## 2024-08-23 PROCEDURE — 83605 ASSAY OF LACTIC ACID: CPT | Performed by: EMERGENCY MEDICINE

## 2024-08-23 PROCEDURE — 93005 ELECTROCARDIOGRAM TRACING: CPT

## 2024-08-23 PROCEDURE — 96372 THER/PROPH/DIAG INJ SC/IM: CPT

## 2024-08-23 PROCEDURE — 84443 ASSAY THYROID STIM HORMONE: CPT

## 2024-08-23 PROCEDURE — 82550 ASSAY OF CK (CPK): CPT | Performed by: EMERGENCY MEDICINE

## 2024-08-23 PROCEDURE — 36415 COLL VENOUS BLD VENIPUNCTURE: CPT

## 2024-08-23 PROCEDURE — 87486 CHLMYD PNEUM DNA AMP PROBE: CPT

## 2024-08-23 PROCEDURE — 83036 HEMOGLOBIN GLYCOSYLATED A1C: CPT

## 2024-08-23 PROCEDURE — 84439 ASSAY OF FREE THYROXINE: CPT

## 2024-08-23 PROCEDURE — 83880 ASSAY OF NATRIURETIC PEPTIDE: CPT | Performed by: EMERGENCY MEDICINE

## 2024-08-23 PROCEDURE — 87070 CULTURE OTHR SPECIMN AEROBIC: CPT

## 2024-08-23 PROCEDURE — 87154 CUL TYP ID BLD PTHGN 6+ TRGT: CPT | Performed by: EMERGENCY MEDICINE

## 2024-08-23 RX ORDER — CETIRIZINE HYDROCHLORIDE 10 MG/1
10 TABLET ORAL DAILY PRN
Status: DISCONTINUED | OUTPATIENT
Start: 2024-08-23 | End: 2024-08-27 | Stop reason: HOSPADM

## 2024-08-23 RX ORDER — NALOXONE HCL 0.4 MG/ML
0.02 VIAL (ML) INJECTION
Status: DISCONTINUED | OUTPATIENT
Start: 2024-08-23 | End: 2024-08-27 | Stop reason: HOSPADM

## 2024-08-23 RX ORDER — LOSARTAN POTASSIUM 25 MG/1
25 TABLET ORAL DAILY
Status: DISCONTINUED | OUTPATIENT
Start: 2024-08-23 | End: 2024-08-23

## 2024-08-23 RX ORDER — FUROSEMIDE 10 MG/ML
40 INJECTION INTRAMUSCULAR; INTRAVENOUS ONCE
Status: COMPLETED | OUTPATIENT
Start: 2024-08-23 | End: 2024-08-23

## 2024-08-23 RX ORDER — AMOXICILLIN 250 MG
1 CAPSULE ORAL 2 TIMES DAILY PRN
Status: DISCONTINUED | OUTPATIENT
Start: 2024-08-23 | End: 2024-08-27 | Stop reason: HOSPADM

## 2024-08-23 RX ORDER — CARVEDILOL 3.12 MG/1
6.25 TABLET ORAL 2 TIMES DAILY
Status: DISCONTINUED | OUTPATIENT
Start: 2024-08-23 | End: 2024-08-27 | Stop reason: HOSPADM

## 2024-08-23 RX ORDER — TALC
6 POWDER (GRAM) TOPICAL NIGHTLY PRN
Status: DISCONTINUED | OUTPATIENT
Start: 2024-08-23 | End: 2024-08-27 | Stop reason: HOSPADM

## 2024-08-23 RX ORDER — IBUPROFEN 200 MG
16 TABLET ORAL
Status: DISCONTINUED | OUTPATIENT
Start: 2024-08-23 | End: 2024-08-27 | Stop reason: HOSPADM

## 2024-08-23 RX ORDER — METOPROLOL TARTRATE 1 MG/ML
5 INJECTION, SOLUTION INTRAVENOUS
Status: COMPLETED | OUTPATIENT
Start: 2024-08-23 | End: 2024-08-23

## 2024-08-23 RX ORDER — ATORVASTATIN CALCIUM 40 MG/1
80 TABLET, FILM COATED ORAL DAILY
Status: DISCONTINUED | OUTPATIENT
Start: 2024-08-24 | End: 2024-08-27 | Stop reason: HOSPADM

## 2024-08-23 RX ORDER — SIMETHICONE 80 MG
1 TABLET,CHEWABLE ORAL 3 TIMES DAILY PRN
Status: DISCONTINUED | OUTPATIENT
Start: 2024-08-23 | End: 2024-08-27 | Stop reason: HOSPADM

## 2024-08-23 RX ORDER — GLUCAGON 1 MG
1 KIT INJECTION
Status: DISCONTINUED | OUTPATIENT
Start: 2024-08-23 | End: 2024-08-27 | Stop reason: HOSPADM

## 2024-08-23 RX ORDER — MAGNESIUM SULFATE HEPTAHYDRATE 40 MG/ML
2 INJECTION, SOLUTION INTRAVENOUS
Status: COMPLETED | OUTPATIENT
Start: 2024-08-23 | End: 2024-08-23

## 2024-08-23 RX ORDER — LABETALOL HCL 20 MG/4 ML
10 SYRINGE (ML) INTRAVENOUS EVERY 4 HOURS PRN
Status: DISCONTINUED | OUTPATIENT
Start: 2024-08-23 | End: 2024-08-27 | Stop reason: HOSPADM

## 2024-08-23 RX ORDER — ACETAMINOPHEN 325 MG/1
650 TABLET ORAL EVERY 6 HOURS PRN
Status: DISCONTINUED | OUTPATIENT
Start: 2024-08-23 | End: 2024-08-24

## 2024-08-23 RX ORDER — PROCHLORPERAZINE EDISYLATE 5 MG/ML
5 INJECTION INTRAMUSCULAR; INTRAVENOUS EVERY 6 HOURS PRN
Status: DISCONTINUED | OUTPATIENT
Start: 2024-08-23 | End: 2024-08-27 | Stop reason: HOSPADM

## 2024-08-23 RX ORDER — SODIUM CHLORIDE 0.9 % (FLUSH) 0.9 %
10 SYRINGE (ML) INJECTION
Status: DISCONTINUED | OUTPATIENT
Start: 2024-08-23 | End: 2024-08-27 | Stop reason: HOSPADM

## 2024-08-23 RX ORDER — NITROGLYCERIN 0.4 MG/1
0.4 TABLET SUBLINGUAL EVERY 5 MIN PRN
Status: DISCONTINUED | OUTPATIENT
Start: 2024-08-23 | End: 2024-08-27 | Stop reason: HOSPADM

## 2024-08-23 RX ORDER — AMIODARONE HYDROCHLORIDE 200 MG/1
200 TABLET ORAL EVERY MORNING
Status: DISCONTINUED | OUTPATIENT
Start: 2024-08-23 | End: 2024-08-27 | Stop reason: HOSPADM

## 2024-08-23 RX ORDER — NAPROXEN SODIUM 220 MG/1
81 TABLET, FILM COATED ORAL ONCE
Status: DISCONTINUED | OUTPATIENT
Start: 2024-08-23 | End: 2024-08-23

## 2024-08-23 RX ORDER — ATORVASTATIN CALCIUM 40 MG/1
40 TABLET, FILM COATED ORAL DAILY
Status: DISCONTINUED | OUTPATIENT
Start: 2024-08-23 | End: 2024-08-23

## 2024-08-23 RX ORDER — ACETAMINOPHEN 325 MG/1
650 TABLET ORAL EVERY 4 HOURS PRN
Status: DISCONTINUED | OUTPATIENT
Start: 2024-08-23 | End: 2024-08-23

## 2024-08-23 RX ORDER — IBUPROFEN 200 MG
24 TABLET ORAL
Status: DISCONTINUED | OUTPATIENT
Start: 2024-08-23 | End: 2024-08-27 | Stop reason: HOSPADM

## 2024-08-23 RX ORDER — FUROSEMIDE 10 MG/ML
40 INJECTION INTRAMUSCULAR; INTRAVENOUS
Status: COMPLETED | OUTPATIENT
Start: 2024-08-23 | End: 2024-08-23

## 2024-08-23 RX ORDER — AMLODIPINE BESYLATE 10 MG/1
10 TABLET ORAL DAILY
Status: DISCONTINUED | OUTPATIENT
Start: 2024-08-23 | End: 2024-08-23

## 2024-08-23 RX ORDER — POLYETHYLENE GLYCOL 3350 17 G/17G
17 POWDER, FOR SOLUTION ORAL DAILY PRN
Status: DISCONTINUED | OUTPATIENT
Start: 2024-08-23 | End: 2024-08-27 | Stop reason: HOSPADM

## 2024-08-23 RX ORDER — HEPARIN SODIUM 5000 [USP'U]/ML
5000 INJECTION, SOLUTION INTRAVENOUS; SUBCUTANEOUS EVERY 12 HOURS
Status: DISCONTINUED | OUTPATIENT
Start: 2024-08-23 | End: 2024-08-27 | Stop reason: HOSPADM

## 2024-08-23 RX ORDER — NAPROXEN SODIUM 220 MG/1
81 TABLET, FILM COATED ORAL ONCE
Status: COMPLETED | OUTPATIENT
Start: 2024-08-24 | End: 2024-08-24

## 2024-08-23 RX ORDER — ONDANSETRON 4 MG/1
8 TABLET, ORALLY DISINTEGRATING ORAL EVERY 8 HOURS PRN
Status: DISCONTINUED | OUTPATIENT
Start: 2024-08-23 | End: 2024-08-27 | Stop reason: HOSPADM

## 2024-08-23 RX ORDER — HYDROCHLOROTHIAZIDE 25 MG/1
25 TABLET ORAL DAILY
Status: DISCONTINUED | OUTPATIENT
Start: 2024-08-23 | End: 2024-08-23

## 2024-08-23 RX ORDER — IBUPROFEN 200 MG
1 TABLET ORAL DAILY PRN
Status: DISCONTINUED | OUTPATIENT
Start: 2024-08-23 | End: 2024-08-27 | Stop reason: HOSPADM

## 2024-08-23 RX ORDER — NAPROXEN SODIUM 220 MG/1
324 TABLET, FILM COATED ORAL
Status: COMPLETED | OUTPATIENT
Start: 2024-08-23 | End: 2024-08-23

## 2024-08-23 RX ORDER — FUROSEMIDE 10 MG/ML
40 INJECTION INTRAMUSCULAR; INTRAVENOUS 2 TIMES DAILY
Status: DISCONTINUED | OUTPATIENT
Start: 2024-08-24 | End: 2024-08-24

## 2024-08-23 RX ADMIN — ONDANSETRON 8 MG: 4 TABLET, ORALLY DISINTEGRATING ORAL at 07:08

## 2024-08-23 RX ADMIN — IOHEXOL 100 ML: 350 INJECTION, SOLUTION INTRAVENOUS at 05:08

## 2024-08-23 RX ADMIN — SACUBITRIL AND VALSARTAN 1 TABLET: 24; 26 TABLET, FILM COATED ORAL at 11:08

## 2024-08-23 RX ADMIN — CARVEDILOL 6.25 MG: 3.12 TABLET, FILM COATED ORAL at 09:08

## 2024-08-23 RX ADMIN — ATORVASTATIN CALCIUM 40 MG: 40 TABLET, FILM COATED ORAL at 07:08

## 2024-08-23 RX ADMIN — HEPARIN SODIUM 5000 UNITS: 5000 INJECTION, SOLUTION INTRAVENOUS; SUBCUTANEOUS at 08:08

## 2024-08-23 RX ADMIN — AMIODARONE HYDROCHLORIDE 200 MG: 200 TABLET ORAL at 06:08

## 2024-08-23 RX ADMIN — NITROGLYCERIN 1 INCH: 20 OINTMENT TOPICAL at 04:08

## 2024-08-23 RX ADMIN — FUROSEMIDE 40 MG: 10 INJECTION, SOLUTION INTRAMUSCULAR; INTRAVENOUS at 04:08

## 2024-08-23 RX ADMIN — HEPARIN SODIUM 5000 UNITS: 5000 INJECTION, SOLUTION INTRAVENOUS; SUBCUTANEOUS at 09:08

## 2024-08-23 RX ADMIN — ASPIRIN 81 MG 324 MG: 81 TABLET ORAL at 04:08

## 2024-08-23 RX ADMIN — LOSARTAN POTASSIUM 25 MG: 25 TABLET, FILM COATED ORAL at 06:08

## 2024-08-23 RX ADMIN — ACETAMINOPHEN 650 MG: 325 TABLET, FILM COATED ORAL at 12:08

## 2024-08-23 RX ADMIN — CARVEDILOL 6.25 MG: 3.12 TABLET, FILM COATED ORAL at 07:08

## 2024-08-23 RX ADMIN — MAGNESIUM SULFATE HEPTAHYDRATE 2 G: 40 INJECTION, SOLUTION INTRAVENOUS at 04:08

## 2024-08-23 RX ADMIN — SACUBITRIL AND VALSARTAN 1 TABLET: 24; 26 TABLET, FILM COATED ORAL at 09:08

## 2024-08-23 RX ADMIN — AMLODIPINE BESYLATE 10 MG: 10 TABLET ORAL at 06:08

## 2024-08-23 RX ADMIN — FUROSEMIDE 40 MG: 10 INJECTION, SOLUTION INTRAMUSCULAR; INTRAVENOUS at 12:08

## 2024-08-23 RX ADMIN — METOPROLOL TARTRATE 5 MG: 1 INJECTION, SOLUTION INTRAVENOUS at 04:08

## 2024-08-23 RX ADMIN — HYDROCHLOROTHIAZIDE 25 MG: 25 TABLET ORAL at 06:08

## 2024-08-23 NOTE — ED PROVIDER NOTES
Encounter Date: 8/23/2024       History     Chief Complaint   Patient presents with    Shortness of Breath     Pt. C/o SOB that started last evening, also c/o L sided chest pain. Breathless in triage, RA 86%     54-year-old male presents to the ED this evening reporting considerable dyspnea, saturating 85% on room air on arrival and tachypneic, diaphoretic.  Patient is endorsing considerable orthopnea, progressively worse over the last 3 days.  Patient was seen in the ED July 24th (1 month ago).  That time patient presented with an uncharacterized tachydysrhythmia, ultimately requiring electrical cardioversion.  [facility] received an implanted defibrillator, improved considerably and was discharged home.  Patient reports he has been feeling generally well over the last month up until the last 3 days.        Review of patient's allergies indicates:  No Known Allergies  History reviewed. No pertinent past medical history.  History reviewed. No pertinent surgical history.  No family history on file.     Review of Systems    Physical Exam     Initial Vitals [08/23/24 0355]   BP Pulse Resp Temp SpO2   (!) 182/121 88 17 98.7 °F (37.1 °C) 97 %      MAP       --         Physical Exam    Nursing note and vitals reviewed.  Constitutional: He appears well-developed and well-nourished. He is not diaphoretic. No distress.   HENT:   Head: Normocephalic and atraumatic.   Eyes: EOM are normal. Pupils are equal, round, and reactive to light. Right eye exhibits no discharge. Left eye exhibits no discharge.   Neck: Neck supple. No thyromegaly present. No tracheal deviation present. No JVD present.   Normal range of motion.  Cardiovascular:  Normal rate, regular rhythm, normal heart sounds and intact distal pulses.           No murmur heard.  Pulmonary/Chest: No stridor. No respiratory distress. He has no wheezes. He has no rhonchi. He has rales.   Abdominal: Abdomen is soft. He exhibits  no distension. There is no abdominal tenderness. There is no rebound and no guarding.   Musculoskeletal:         General: No tenderness or edema. Normal range of motion.      Cervical back: Normal range of motion and neck supple.     Neurological: He is alert and oriented to person, place, and time. He has normal strength. No cranial nerve deficit. GCS score is 15. GCS eye subscore is 4. GCS verbal subscore is 5. GCS motor subscore is 6.   Skin: Skin is warm and dry. Capillary refill takes less than 2 seconds. No rash and no abscess noted. No erythema. No pallor.   Psychiatric: He has a normal mood and affect. His behavior is normal. Judgment and thought content normal.         ED Course   Procedures  Labs Reviewed   COMPREHENSIVE METABOLIC PANEL - Abnormal       Result Value    Sodium 137      Potassium 3.7      Chloride 107      CO2 22      Glucose 163 (*)     Blood Urea Nitrogen 11.5      Creatinine 1.00      Calcium 9.4      Protein Total 6.8      Albumin 3.1 (*)     Globulin 3.7 (*)     Albumin/Globulin Ratio 0.8 (*)     Bilirubin Total 1.0      ALP 91      ALT 23      AST 19      eGFR >60      Anion Gap 8.0      BUN/Creatinine Ratio 12     BLOOD GAS - Abnormal    Sample Type Arterial Blood      Sample site Left Radial Artery      Drawn by CINTHIA      pH, Blood gas 7.430      pCO2, Blood gas 37.0      pO2, Blood gas 73.0 (*)     TOC2, Blood gas 25.7      Base Excess, Blood gas 0.50      sO2, Blood gas 97.3      HCO3, Blood gas 24.6      pAO2 182      A-aDO2 109      THb, Blood gas 14.7      O2 Hb, Blood Gas 93.5 (*)     CO Hgb 2.7 (*)     Met Hgb 1.2      Allens Test Yes      Oxygen Device, Blood gas Cannula      LPM 3.0      FIO2, Blood gas 32.0     D DIMER, QUANTITATIVE - Abnormal    D-Dimer 1.22 (*)    B-TYPE NATRIURETIC PEPTIDE - Abnormal    Natriuretic Peptide 962.2 (*)    CBC WITH DIFFERENTIAL - Abnormal    WBC 11.57 (*)     RBC 4.62 (*)     Hgb 14.5      Hct 42.1      MCV 91.1      MCH 31.4 (*)     MCHC  34.4      RDW 13.4      Platelet 252      MPV 9.7      Neut % 87.0      Lymph % 4.9      Mono % 7.3      Eos % 0.3      Basophil % 0.2      Lymph # 0.57 (*)     Neut # 10.05 (*)     Mono # 0.85      Eos # 0.04      Baso # 0.02      IG# 0.04      IG% 0.3      NRBC% 0.0     CK - Normal    Creatine Kinase 108     CK-MB - Normal    Creatine Kinase MB <1.0     TROPONIN I - Normal    Troponin-I <0.010     MAGNESIUM - Normal    Magnesium Level 1.80     CBC W/ AUTO DIFFERENTIAL    Narrative:     The following orders were created for panel order CBC auto differential.  Procedure                               Abnormality         Status                     ---------                               -----------         ------                     CBC with Differential[[id number]]       Abnormal            Final result                 Please view results for these tests on the individual orders.   COVID/FLU A&B PCR     EKG Readings: (Independently Interpreted)   Nsr at 86, non specific lateral st/t changes unchanged compared to last month ;       Imaging Results              CTA Chest Non-Coronary (PE Studies) (In process)                      X-Ray Chest AP Portable (Preliminary result)  Result time 08/23/24 05:22:01      Wet Read by Fito Michaels MD (08/23/24 05:22:01, [facility], Emergency Medicine)    Cardiomegaly with moderate pulmonary vascular congestion;                                  X-Rays:   Independently Interpreted Readings:   Other Readings:  Cardiomegaly with moderate pulmonary vascular congestion;    Medications   magnesium sulfate 2g in water 50mL IVPB (premix) (2 g Intravenous New Bag 8/23/24 0437)   nitroGLYCERIN 2% TD oint ointment 1 inch (1 inch Transdermal Given 8/23/24 0417)   metoprolol injection 5 mg (5 mg Intravenous Given 8/23/24 0429)   aspirin chewable tablet 324 mg (324 mg Oral Given 8/23/24 0417)   furosemide injection 40 mg (40 mg Intravenous Given 8/23/24 0436)    iohexoL (OMNIPAQUE 350) 350 mg iodine/mL injection (100 mLs  Given 8/23/24 0527)     Medical Decision Making  Differential diagnosis ACS/NSTEMI, CHF exacerbation, tachydysrhythmia, PE, pneumonia, others ...    Amount and/or Complexity of Data Reviewed  External Data Reviewed: notes.     Details: Recent admit with AICD placement for apparent VT;  Labs: ordered. Decision-making details documented in ED Course.     Details: As above;  Radiology: ordered and independent interpretation performed. Decision-making details documented in ED Course.     Details: Cardiomegaly with moderate pulmonary vascular congestion;  ECG/medicine tests: ordered and independent interpretation performed. Decision-making details documented in ED Course.     Details: Nsr at 86, non specific lateral st/t changes unchanged compared to last month ;  Discussion of management or test interpretation with external provider(s): Inpatient medicine team aware of case, plan admit;    Risk  OTC drugs.  Prescription drug management.  Decision regarding hospitalization.  Risk Details: Risk found sufficient to warrant expanded evaluation with objective data.  The data resulted in found most compatible with CHF exacerbation.  Patient is improved with initial interventions.  Risk found sufficient to warrant admission for further evaluation, supervision of clinical course.               ED Course as of 08/23/24 0531   Fri Aug 23, 2024   0426 Reassuring hemogram; [CT]   0454 Considerable AA gradient on blood gases; normal PH; [CT]   0455 Negative troponin; [CT]   0455 Elevated BNP; [CT]   0455 Elevated dimer; [CT]   0506 Reassuring chemistries; [CT]   0520 Normal total CK, negative MB fraction; [CT]      ED Course User Index  [CT] Fito Michaels MD                           Clinical Impression:  Final diagnoses:  [R06.02] SOB (shortness of breath)  [R06.00] Dyspnea  [I50.9] Congestive heart failure, unspecified HF chronicity, unspecified heart failure  type (Primary)          ED Disposition Condition    Admit Stable                Theodvu, Fito CROFT MD  08/23/24 0566

## 2024-08-23 NOTE — CARE UPDATE
Patient seen and evaluated this AM. Received x1 dose of Lasix 40, will continue to diurese and begin low dose entresto. Awaiting echocardiogram results, new lower EF will need further evaluation. Currently, has mild clinical improvement, reports mildly improved symptoms.  - patient reports stent placed in 2008, will stop plavix  - continue diuresis, 40 lasix BID  - started low dose entresto, d/c amlodipine  - will get A1c, TSH  - trending trop, initial negative  - increased statin to 80 mg  - hold off on cardiology consult for now, wait for echocardiogram read    Jayro English MD  PGY-1

## 2024-08-23 NOTE — CONSULTS
Consult for home health noted. Patient is currently uninsured and will not be eligible to receive HH services at this time.

## 2024-08-23 NOTE — PLAN OF CARE
Problem: Adult Inpatient Plan of Care  Goal: Plan of Care Review  Outcome: Progressing  Goal: Patient-Specific Goal (Individualized)  Outcome: Progressing  Goal: Absence of Hospital-Acquired Illness or Injury  Outcome: Progressing  Goal: Optimal Comfort and Wellbeing  Outcome: Progressing  Goal: Readiness for Transition of Care  Outcome: Progressing     Problem: Heart Failure  Goal: Optimal Coping  Outcome: Progressing  Goal: Optimal Cardiac Output  Outcome: Progressing  Goal: Stable Heart Rate and Rhythm  Outcome: Progressing  Goal: Optimal Functional Ability  Outcome: Progressing  Goal: Fluid and Electrolyte Balance  Outcome: Progressing  Goal: Improved Oral Intake  Outcome: Progressing  Goal: Effective Oxygenation and Ventilation  Outcome: Progressing  Goal: Effective Breathing Pattern During Sleep  Outcome: Progressing

## 2024-08-23 NOTE — H&P
[facility] History & Physical Note    Resident Team: [facility] Medicine List 3  Attending Physician: Liam Strong MD  Resident: jC Lee MD  Intern: Stephani Patel MD    Subjective:      History of Present Illness:  Eliseo Denson is a 54 y.o.  male with PMH of hypertension, hyperlipidemia, CAD status post stent placement (unknown vessels), NSTEMI, ischemic cardiomyopathy, heart failure reduced ejection fraction (EF 30%), who presented to [facility] ED (8/23/2024) due to shortness of breath.  Patient states that he was previously hospitalized approximately 1 month ago.  He presented to [facility] ED and was found to have a myocardial infarction at that time.  He suffered a run of V-tach while in ED and was subsequently defibrillated then transferred to our [facility] where he underwent left heart catheterization.  Left heart catheterization negative for obstructive heart disease of native valves and/or stents.  Patient was subsequently discharged with medical management with planned follow up.  Patient endorses 100% compliance with home regimen.  He states that beginning approximately 3 days ago he began to experience worsening shortness of breath that initially began with ambulation only.  Patient was previously able to ambulate without limitations prior to 3 days ago.  He started breath approximate 20 ft which progressively worsened to shortness of breath at rest with worsening orthopnea.  He normally rest with 2 pillows underneath him at night and had add another 2 pillows beneath him in order to sleep.  He also notes experiencing been bendopnea over the last 3 days as well.  Yesterday evening patient experienced new onset left apical sharp, stabbing 6/10 chest pain and worsening shortness of breath.  Symptoms persisted for multiple hours as patient was unable to sleep prompting wife to bring patient to ED for evaluation at that time.    ED course:  Vitals  show patient hypertensive (/121) but remaining vitals stable.  CBC showed leukocytosis (white blood cell 11.57 with left shift) but otherwise unremarkable.  CMP unremarkable.  .2.  Troponin negative.  D-dimer 1.22.  Chest x-ray showed bilateral interstitial predominant opacities consistent with likely volume overload.  CTA negative for PE.  Patient given IV Lasix 40 mg times once in ED diuresis 1 L while in ED. internal Medicine consulted for admission for acute CHF exacerbation presumed secondary to uncontrolled hypertension.    Past Medical History:  History reviewed. No pertinent past medical history.  Past Surgical History:  History reviewed. No pertinent surgical history.  Family History:  No family history on file.  Social History:     Allergies:  Review of patient's allergies indicates:  No Known Allergies  Home Medications:  Prior to Admission medications    Medication Sig Start Date End Date Taking? Authorizing Provider   amiodarone (PACERONE) 200 MG Tab Take 200 mg by mouth every morning. 4/16/24   Provider, Historical   amLODIPine (NORVASC) 10 MG tablet Take 10 mg by mouth once daily. 4/16/24   Provider, Historical   JAUN CHEWABLE ASPIRIN 81 mg Chew Take 81 mg by mouth. 4/16/24   Provider, Historical   clopidogreL (PLAVIX) 75 mg tablet Take 75 mg by mouth once daily. 4/16/24   Provider, Historical   losartan-hydrochlorothiazide 100-25 mg (HYZAAR) 100-25 mg per tablet Take 1 tablet by mouth once daily. 4/16/24   Provider, Historical   rosuvastatin (CRESTOR) 10 MG tablet Take 10 mg by mouth once daily. 4/16/24   Provider, Historical     Review of Systems:  Review of Systems   Constitutional:  Negative for chills, diaphoresis, fatigue and fever.   HENT:  Negative for ear pain, hearing loss, rhinorrhea, sore throat, tinnitus and trouble swallowing.    Eyes:  Negative for pain, redness and visual disturbance.   Respiratory:  Positive for cough and shortness of breath. Negative for chest  "tightness.         Orthopnea  Bendopnea   Cardiovascular:  Positive for chest pain. Negative for palpitations.   Gastrointestinal:  Negative for abdominal pain, constipation, diarrhea, nausea and vomiting.   Genitourinary:  Negative for difficulty urinating, dysuria, frequency, hematuria and urgency.   Musculoskeletal:  Negative for arthralgias and myalgias.   Skin:  Negative for rash and wound.   Neurological:  Negative for dizziness, seizures, syncope, weakness, light-headedness, numbness and headaches.   Psychiatric/Behavioral:  Negative for confusion.         Objective:   Last 24 Hour Vital Signs:  BP  Min: 140/88  Max: 182/121  Temp  Av.7 °F (37.1 °C)  Min: 98.7 °F (37.1 °C)  Max: 98.7 °F (37.1 °C)  Pulse  Av.7  Min: 70  Max: 88  Resp  Av.5  Min: 17  Max: 31  SpO2  Av.2 %  Min: 95 %  Max: 97 %  Height  Av' 11" (180.3 cm)  Min: 5' 11" (180.3 cm)  Max: 5' 11" (180.3 cm)  Weight  Av.1 kg (225 lb)  Min: 102.1 kg (225 lb)  Max: 102.1 kg (225 lb)  Body mass index is 31.38 kg/m².  No intake/output data recorded.    Physical Examination:  Physical Exam  Vitals reviewed.   Constitutional:       General: He is not in acute distress.     Appearance: Normal appearance. He is normal weight. He is ill-appearing.   HENT:      Head: Normocephalic and atraumatic.      Right Ear: External ear normal.      Left Ear: External ear normal.   Eyes:      General: No scleral icterus.        Right eye: No discharge.         Left eye: No discharge.      Extraocular Movements: Extraocular movements intact.      Conjunctiva/sclera: Conjunctivae normal.      Pupils: Pupils are equal, round, and reactive to light.   Cardiovascular:      Rate and Rhythm: Normal rate and regular rhythm.      Pulses: Normal pulses.      Heart sounds: Normal heart sounds. No murmur heard.     No friction rub. No gallop.   Pulmonary:      Effort: Respiratory distress present.      Breath sounds: Normal breath sounds. No stridor. No " "wheezing, rhonchi or rales.   Abdominal:      General: Abdomen is flat. Bowel sounds are normal. There is no distension.      Palpations: Abdomen is soft.      Tenderness: There is no abdominal tenderness. There is no guarding.   Musculoskeletal:         General: No swelling, tenderness, deformity or signs of injury. Normal range of motion.      Right lower leg: No edema.      Left lower leg: No edema.   Skin:     General: Skin is warm and dry.      Capillary Refill: Capillary refill takes less than 2 seconds.      Coloration: Skin is not jaundiced.      Findings: No bruising, erythema or rash.   Neurological:      Mental Status: He is alert.      Comments: AAO to person, place, time, and situation; CN II-XII grossly intact         Laboratory:  Most Recent Data:  CBC:   Lab Results   Component Value Date    WBC 11.57 (H) 08/23/2024    HGB 14.5 08/23/2024    HCT 42.1 08/23/2024     08/23/2024    MCV 91.1 08/23/2024    RDW 13.4 08/23/2024     WBC Differential:   Recent Labs   Lab 08/23/24  0419   WBC 11.57*   HGB 14.5   HCT 42.1      MCV 91.1     BMP:   Lab Results   Component Value Date     08/23/2024    K 3.7 08/23/2024     08/23/2024    CO2 22 08/23/2024    BUN 11.5 08/23/2024    CREATININE 1.00 08/23/2024    CALCIUM 9.4 08/23/2024    MG 1.80 08/23/2024     LFTs:   Lab Results   Component Value Date    ALBUMIN 3.1 (L) 08/23/2024    BILITOT 1.0 08/23/2024    AST 19 08/23/2024    ALKPHOS 91 08/23/2024    ALT 23 08/23/2024     Coags:   Lab Results   Component Value Date    INR 1.0 07/25/2024     FLP:   Lab Results   Component Value Date    CHOL 207 (H) 07/25/2024    HDL 48 07/25/2024    LDLCALC 136 (H) 07/25/2024    TRIG 117 07/25/2024     DM:   Lab Results   Component Value Date    LDLCALC 136 (H) 07/25/2024    CREATININE 1.00 08/23/2024     Thyroid:   Lab Results   Component Value Date    TSH 0.636 07/24/2024     Anemia: No results found for: "IRON", "TIBC", "FERRITIN", "[id number]", " ""FOLATE"  Cardiac:   Lab Results   Component Value Date    TROPONINI <0.010 08/23/2024    .2 (H) 08/23/2024     Urinalysis:   Lab Results   Component Value Date    COLORU Light-Yellow 07/24/2024    NITRITE Negative 07/24/2024    UROBILINOGEN Normal 07/24/2024    WBCUA 0-5 07/24/2024     Trended Lab Data:  Recent Labs   Lab 08/23/24 0419   WBC 11.57*   HGB 14.5   HCT 42.1      MCV 91.1   RDW 13.4      K 3.7      CO2 22   BUN 11.5   CREATININE 1.00   ALBUMIN 3.1*   BILITOT 1.0   AST 19   ALKPHOS 91   ALT 23     Trended Cardiac Data:  Recent Labs   Lab 08/23/24 0419   TROPONINI <0.010   .2*     Radiology:  Imaging Results              CTA Chest Non-Coronary (PE Studies) (Preliminary result)  Result time 08/23/24 05:54:33      Preliminary result by Carmelo Jacobo Jr., MD (08/23/24 05:54:33)                   Narrative:    START OF REPORT:  Technique: CT Scan of the chest was performed with intravenous contrast with direct axial images as well as sagittal and coronal reconstruction images pulmonary embolus protocol.    Dosage Information: Automated Exposure Control was utilized.    Comparison: Correlation is with study [id number] 05:18:01.    Clinical History: Pulmonary embolism (PE) suspected, positive D-dimer.    Findings:  Soft Tissues: Unremarkable.  Lines and Tubes: None.  Neck: The visualized soft tissues of the neck appear unremarkable.  Mediastinum: The mediastinal structures are within normal limits.  Heart: The heart appears unremarkable. Cardiac pacer leads are seen in the right atrium and right ventricle.  Aorta: No aortic dissection or aneurysm is seen.  Pulmonary Arteries: Unremarkable. No filling defects are seen in the pulmonary arteries to suggest pulmonary embolus.  Lungs: Mild streaky linear opacity is seen predominantly in the dependent portions at the lung bases consistent with dependent changes scarring and subsegmental atelectasis. There are scattered " reticular, nodular and patchy grounglass opacities in both lungs, more pronounced in the right. This is consistent with multifocal possibly atypical pneumonia.  Pleura: No effusions or pneumothorax are identified.  Bony Structures:  Spine: Subtle spondylolytic changes are seen in the thoracic spine.  Ribs: The bilateral ribs appear unremarkable.  Abdomen: A 2.5 cm cyst is noted in the left kidney.      Impression:  1. No filling defects are seen in the pulmonary arteries to suggest pulmonary embolus.  2. There are scattered reticular, nodular and patchy grounglass opacities in both lungs, more pronounced in the right. This is consistent with multifocal possibly atypical pneumonia. Correlate clinically as regards further evaluation and follow up.  3. No CT evidence of pulmonary embolism. Details and other findings as discussed above.                                         X-Ray Chest AP Portable (Final result)  Result time 08/23/24 06:11:39      Final result by Carlos Georges MD (08/23/24 06:11:39)                   Impression:      Bilateral interstitial predominant opacities, infection versus edema.      Electronically signed by: Carlos Georges  Date:    08/23/2024  Time:    06:11               Narrative:    EXAMINATION:  XR CHEST AP PORTABLE    CLINICAL HISTORY:  Dyspnea, unspecified    COMPARISON:  24 July 2024    FINDINGS:  Frontal view of the chest was obtained. There is left-sided AICD.  The heart is enlarged.  There are bilateral interstitial predominant opacities.  No pneumothorax.                        Wet Read by Fito Michaels MD (08/23/24 05:22:01, [facility], Emergency Medicine)    Cardiomegaly with moderate pulmonary vascular congestion;                                     Assessment & Plan:     Hypertension   Hyperlipidemia  CAD status post stent placement (vessels unknown)  Ischemic cardiomyopathy  Acute CHF exacerbation  -ddimer 1.22  -CTA negative for  PE  -patient reports last echo showed EF 30%  -bedside TTE showed EF approximate 20%  -we will obtain official TTE this a.m.  -home regimen: Carvedilol 6.25 mg b.i.d., rosuvastatin 10 mg q.d., aspirin 81 mg q.d., amiodarone 200 mg q.d.  -loaded with aspirin 325 mg in ED  -given IV Lasix 40 mg x 1 in ED  -diuresed 1 L in ED so far  -restarted home carvedilol 6.125 mg b.i.d.  -initiate atorvastatin 40 mg q.d. while inpatient and place of rosuvastatin  -supposed to be on losartan 100 mg q.d. and hydrochlorothiazide 25 mg q.d. however patient indicates that medication was not continued to recent hospitalization approximately 1 month ago  -we will continue to up titrate GDMT until optimized; consider restarting prior home meds as indicated and tolerated  -strict Is&Os    SIRS 1/4 (RR>20)  Reticular nodular ground-glass opacities  -CTA showed incidental finding of scattered reticular, nodular patchy ground-glass opacities in bilateral lung fields more pronounced on right side  -MRSA nares, sputum cultures, COVID/flu/RSV, respiratory viral panel, pending  -blood cultures pending  -we will hold on initiate antibiotics at this time as based on patient history and workup as law my differential for CTA findings  -continue to monitor closely    Code Status:  Full code  GI Access:  P.o.  Feeding:  Cardiac diet  IV Access:  PIV  Fluids:  None  Analgesia:  Acetaminophen 650 mg q.6 hours p.r.n. mild pain  Thromboprophylaxis:  Subcu heparin    Disposition:  Admit to internal medicine for treatment of acute CHF exacerbation presumed secondary to uncontrolled hypertension.  Initiate GDMT. continue diurese.  We will discharge to home with home health when appropriate.    Cj Lee MD  [facility], PGY-2

## 2024-08-24 PROBLEM — R78.81 STAPHYLOCOCCUS AUREUS BACTEREMIA: Status: ACTIVE | Noted: 2024-08-24

## 2024-08-24 PROBLEM — B95.61 STAPHYLOCOCCUS AUREUS BACTEREMIA: Status: ACTIVE | Noted: 2024-08-24

## 2024-08-24 LAB
ALBUMIN SERPL-MCNC: 3.5 G/DL (ref 3.5–5)
ALBUMIN/GLOB SERPL: 0.7 RATIO (ref 1.1–2)
ALP SERPL-CCNC: 105 UNIT/L (ref 40–150)
ALT SERPL-CCNC: 23 UNIT/L (ref 0–55)
ANION GAP SERPL CALC-SCNC: 11 MEQ/L
AST SERPL-CCNC: 22 UNIT/L (ref 5–34)
BASOPHILS # BLD AUTO: 0.02 X10(3)/MCL
BASOPHILS NFR BLD AUTO: 0.2 %
BILIRUB SERPL-MCNC: 1.6 MG/DL
BUN SERPL-MCNC: 11.6 MG/DL (ref 8.4–25.7)
CALCIUM SERPL-MCNC: 10.7 MG/DL (ref 8.4–10.2)
CHLORIDE SERPL-SCNC: 94 MMOL/L (ref 98–107)
CO2 SERPL-SCNC: 29 MMOL/L (ref 22–29)
CREAT SERPL-MCNC: 1.22 MG/DL (ref 0.73–1.18)
CREAT/UREA NIT SERPL: 10
EOSINOPHIL # BLD AUTO: 0 X10(3)/MCL (ref 0–0.9)
EOSINOPHIL NFR BLD AUTO: 0 %
ERYTHROCYTE [DISTWIDTH] IN BLOOD BY AUTOMATED COUNT: 13.2 % (ref 11.5–17)
GFR SERPLBLD CREATININE-BSD FMLA CKD-EPI: >60 ML/MIN/1.73/M2
GLOBULIN SER-MCNC: 5.3 GM/DL (ref 2.4–3.5)
GLUCOSE SERPL-MCNC: 124 MG/DL (ref 74–100)
HCT VFR BLD AUTO: 52.5 % (ref 42–52)
HGB BLD-MCNC: 17.9 G/DL (ref 14–18)
IMM GRANULOCYTES # BLD AUTO: 0.05 X10(3)/MCL (ref 0–0.04)
IMM GRANULOCYTES NFR BLD AUTO: 0.5 %
LYMPHOCYTES # BLD AUTO: 0.56 X10(3)/MCL (ref 0.6–4.6)
LYMPHOCYTES NFR BLD AUTO: 5.3 %
MAGNESIUM SERPL-MCNC: 2.4 MG/DL (ref 1.6–2.6)
MCH RBC QN AUTO: 31.3 PG (ref 27–31)
MCHC RBC AUTO-ENTMCNC: 34.1 G/DL (ref 33–36)
MCV RBC AUTO: 91.8 FL (ref 80–94)
MONOCYTES # BLD AUTO: 0.5 X10(3)/MCL (ref 0.1–1.3)
MONOCYTES NFR BLD AUTO: 4.8 %
NEUTROPHILS # BLD AUTO: 9.37 X10(3)/MCL (ref 2.1–9.2)
NEUTROPHILS NFR BLD AUTO: 89.2 %
NRBC BLD AUTO-RTO: 0 %
PHOSPHATE SERPL-MCNC: 3.8 MG/DL (ref 2.3–4.7)
PLATELET # BLD AUTO: 255 X10(3)/MCL (ref 130–400)
PMV BLD AUTO: 9.8 FL (ref 7.4–10.4)
POTASSIUM SERPL-SCNC: 3.8 MMOL/L (ref 3.5–5.1)
PROT SERPL-MCNC: 8.8 GM/DL (ref 6.4–8.3)
RBC # BLD AUTO: 5.72 X10(6)/MCL (ref 4.7–6.1)
SODIUM SERPL-SCNC: 134 MMOL/L (ref 136–145)
WBC # BLD AUTO: 10.5 X10(3)/MCL (ref 4.5–11.5)

## 2024-08-24 PROCEDURE — 85025 COMPLETE CBC W/AUTO DIFF WBC: CPT

## 2024-08-24 PROCEDURE — 84100 ASSAY OF PHOSPHORUS: CPT

## 2024-08-24 PROCEDURE — 25000003 PHARM REV CODE 250

## 2024-08-24 PROCEDURE — 80053 COMPREHEN METABOLIC PANEL: CPT

## 2024-08-24 PROCEDURE — 83735 ASSAY OF MAGNESIUM: CPT

## 2024-08-24 PROCEDURE — 27000221 HC OXYGEN, UP TO 24 HOURS

## 2024-08-24 PROCEDURE — G0378 HOSPITAL OBSERVATION PER HR: HCPCS

## 2024-08-24 PROCEDURE — 36415 COLL VENOUS BLD VENIPUNCTURE: CPT

## 2024-08-24 PROCEDURE — 96372 THER/PROPH/DIAG INJ SC/IM: CPT

## 2024-08-24 PROCEDURE — 63600175 PHARM REV CODE 636 W HCPCS

## 2024-08-24 PROCEDURE — 94761 N-INVAS EAR/PLS OXIMETRY MLT: CPT

## 2024-08-24 PROCEDURE — 93005 ELECTROCARDIOGRAM TRACING: CPT

## 2024-08-24 RX ORDER — ACETAMINOPHEN 500 MG
1000 TABLET ORAL EVERY 6 HOURS PRN
Status: DISCONTINUED | OUTPATIENT
Start: 2024-08-24 | End: 2024-08-27 | Stop reason: HOSPADM

## 2024-08-24 RX ORDER — ACETAMINOPHEN 325 MG/1
650 TABLET ORAL EVERY 6 HOURS PRN
Status: DISCONTINUED | OUTPATIENT
Start: 2024-08-24 | End: 2024-08-24

## 2024-08-24 RX ADMIN — HEPARIN SODIUM 5000 UNITS: 5000 INJECTION, SOLUTION INTRAVENOUS; SUBCUTANEOUS at 09:08

## 2024-08-24 RX ADMIN — FUROSEMIDE 40 MG: 10 INJECTION, SOLUTION INTRAMUSCULAR; INTRAVENOUS at 08:08

## 2024-08-24 RX ADMIN — ACETAMINOPHEN 1000 MG: 500 TABLET ORAL at 08:08

## 2024-08-24 RX ADMIN — CARVEDILOL 6.25 MG: 3.12 TABLET, FILM COATED ORAL at 08:08

## 2024-08-24 RX ADMIN — ASPIRIN 81 MG 81 MG: 81 TABLET ORAL at 12:08

## 2024-08-24 RX ADMIN — SACUBITRIL AND VALSARTAN 1 TABLET: 24; 26 TABLET, FILM COATED ORAL at 08:08

## 2024-08-24 RX ADMIN — ATORVASTATIN CALCIUM 80 MG: 40 TABLET, FILM COATED ORAL at 08:08

## 2024-08-24 RX ADMIN — VANCOMYCIN HYDROCHLORIDE 2000 MG: 1 INJECTION, POWDER, LYOPHILIZED, FOR SOLUTION INTRAVENOUS at 12:08

## 2024-08-24 RX ADMIN — ACETAMINOPHEN 650 MG: 325 TABLET, FILM COATED ORAL at 12:08

## 2024-08-24 RX ADMIN — HEPARIN SODIUM 5000 UNITS: 5000 INJECTION, SOLUTION INTRAVENOUS; SUBCUTANEOUS at 08:08

## 2024-08-24 RX ADMIN — VANCOMYCIN HYDROCHLORIDE 1500 MG: 1.5 INJECTION, POWDER, LYOPHILIZED, FOR SOLUTION INTRAVENOUS at 12:08

## 2024-08-24 RX ADMIN — AMIODARONE HYDROCHLORIDE 200 MG: 200 TABLET ORAL at 06:08

## 2024-08-24 RX ADMIN — CARVEDILOL 6.25 MG: 3.12 TABLET, FILM COATED ORAL at 09:08

## 2024-08-24 NOTE — PROGRESS NOTES
"Pharmacokinetic Initial Assessment: IV Vancomycin    Assessment/Plan:    Initiate intravenous vancomycin with loading dose of 2000 mg once followed by a maintenance dose of vancomycin 1500mg IV every 12 hours  Desired empiric serum trough concentration is 15 to 20 mcg/mL  Draw vancomycin trough level 60 min prior to fourth dose on 08/25/24 at approximately 1130  Pharmacy will continue to follow and monitor vancomycin.      Please contact pharmacy at extension [phone] with any questions regarding this assessment.     Thank you for the consult,   Iris Clement       Patient brief summary:  Eliseo Denson is a 54 y.o. male initiated on antimicrobial therapy with IV Vancomycin for treatment of suspected bacteremia    Drug Allergies:   Review of patient's allergies indicates:  No Known Allergies    Actual Body Weight:   107.1 kg    Renal Function:   Estimated Creatinine Clearance: 105.1 mL/min (based on SCr of 1 mg/dL).,     Dialysis Method (if applicable):  N/A    CBC (last 72 hours):  Recent Labs   Lab Result Units 08/23/24  0419 08/23/24  1221   WBC x10(3)/mcL 11.57*  --    Hgb g/dL 14.5  --    Hemoglobin A1c %  --  5.7   Hct % 42.1  --    Platelet x10(3)/mcL 252  --    Mono % % 7.3  --    Eos % % 0.3  --    Basophil % % 0.2  --        Metabolic Panel (last 72 hours):  Recent Labs   Lab Result Units 08/23/24  0419   Sodium mmol/L 137   Potassium mmol/L 3.7   Chloride mmol/L 107   CO2 mmol/L 22   Glucose mg/dL 163*   Blood Urea Nitrogen mg/dL 11.5   Creatinine mg/dL 1.00   Albumin g/dL 3.1*   Bilirubin Total mg/dL 1.0   ALP unit/L 91   AST unit/L 19   ALT unit/L 23   Magnesium Level mg/dL 1.80       Drug levels (last 3 results):  No results for input(s): "VANCOMYCINRA", "VANCORANDOM", "VANCOMYCINPE", "VANCOPEAK", "VANCOMYCINTR", "VANCOTROUGH" in the last 72 hours.    Microbiologic Results:  Microbiology Results (last 7 days)       Procedure Component Value Units Date/Time    Blood Culture #1 **CANNOT BE " ORDERED STAT** [[id number]]  (Abnormal) Collected: 08/23/24 0643    Order Status: Completed Specimen: Blood from Antecubital, Left Updated: 08/23/24 2347     GRAM STAIN Gram Positive Cocci, probable Staphylococcus      Seen in gram stain of broth only      2 of 2 bottles positive    BCID2 Panel [[id number]]  (Abnormal) Collected: 08/23/24 0643    Order Status: Completed Specimen: Blood from Antecubital, Left Updated: 08/23/24 2247     CTX-M (ESBL ) N/A     IMP (Cabapenemase ) N/A     KPC resistance gene (Carbapenemase ) N/A     mcr-1 N/A     mecA ID N/A     Comment: Note: Antimicrobial resistance can occur via multiple mechanisms. A Not Detected result for antimicrobial resistance gene(s) does not indicate antimicrobial susceptibility. Subculturing is required for species identification and susceptibility testing of   isolates.        mecA/C and MREJ (MRSA) gene Not Detected     NDM (Carbapenemase ) N/A     OXA-48-like (Carbapenemase ) N/A     Corky/B (VRE gene) N/A     VIM (Carbapenemase ) N/A     Enterococcus faecalis Not Detected     Enterococcus faecium Not Detected     Listeria monocytogenes Not Detected     Staphylococcus spp. Detected     Staphylococcus aureus Detected     Staphylococcus epidermidis Not Detected     Staphylococcus lugdunensis Not Detected     Streptococcus spp. Not Detected     Streptococcus agalactiae (Group B) Not Detected     Streptococcus pneumoniae Not Detected     Streptococcus pyogenes (Group A) Not Detected     Acinetobacter calcoaceticus/baumannii complex Not Detected     Bacteroides fragilis Not Detected     Enterobacterales Not Detected     Enterobacter cloacae complex Not Detected     Escherichia coli Not Detected     Klebsiella aerogenes Not Detected     Klebsiella oxytoca Not Detected     Klebsiella pneumoniae group Not Detected     Proteus spp. Not Detected     Salmonella spp. Not Detected     Serratia marcescens Not Detected      Haemophilus influenzae Not Detected     Neisseria meningitidis Not Detected     Pseudomonas aeruginosa Not Detected     Stenotrophomonas maltophilia Not Detected     Candida albicans Not Detected     Candida auris Not Detected     Candida glabrata Not Detected     Candida krusei Not Detected     Candida parapsilosis Not Detected     Candida tropicalis Not Detected     Cryptococcus neoformans/gattii Not Detected    Narrative:      The [employer]iFlex BCID2 Panel is a multiplexed nucleic acid test intended for the use with Heroes2u® 2.0 or Heroes2u® Open[employer] Systems for the simultaneous qualitative detection and identification of multiple bacterial and yeast nucleic acids and select genetic determinants associated with antimicrobial resistance.  The [employer] BCID2 Panel test is performed directly on blood culture samples identified as positive by a continuous monitoring blood culture system.  Results are intended to be interpreted in conjunction with Gram stain results.    Blood Culture #2 **CANNOT BE ORDERED STAT** [[id number]]  (Abnormal) Collected: 08/23/24 0643    Order Status: Completed Specimen: Blood from Antecubital, Right Updated: 08/23/24 2247     GRAM STAIN Seen in gram stain of broth only      Gram Positive Cocci, probable Staphylococcus      2 of 2 bottles positive    Respiratory Culture [[id number]] Collected: 08/23/24 1123    Order Status: Sent Specimen: Sputum, Expectorated Updated: 08/23/24 1134

## 2024-08-24 NOTE — CARE UPDATE
2 out of 2 Blood cultures resulted in gram positive cocci, probable staphylococcus. BCID2 panel positive for Staph aureus.   Will start vancomycin  Plan to repeat blood cultures 48-72 hrs after treatment with vancomycin    Freddy Sarah MD   [facility] Resident   08/23/2024

## 2024-08-24 NOTE — NURSING
Pt c/o nausea, sweating. States he is unsure why it is happening, but also happens at home. . Pt states he hasn't really eaten. Has never had issues with his sugar before.   Notified MD on call.

## 2024-08-24 NOTE — NURSING
Lab called. States pt blood cultures 2/2 results  gram+ cocci. Probable staph.   Notified MD on call

## 2024-08-24 NOTE — PROGRESS NOTES
[facility]   Progress Note     Resident Team: [facility] Medicine List 3  Attending Physician: Liam Strong MD  Resident: Marifer  Intern: Merlin     Subjective:      Brief HPI:  Eliseo Denson is a 54 y.o.  male with PMH of hypertension, hyperlipidemia, CAD status post stent placement (unknown vessels), NSTEMI, ischemic cardiomyopathy, heart failure reduced ejection fraction (EF 30%), who presented to [facility] ED (8/23/2024) due to shortness of breath.  Patient states that he was previously hospitalized approximately 1 month ago.  He presented to [facility] ED and was found to have a myocardial infarction at that time.  He suffered a run of V-tach while in ED and was subsequently defibrillated then transferred to our [facility] where he underwent left heart catheterization.  Left heart catheterization negative for obstructive heart disease of native valves and/or stents.  Patient was subsequently discharged with medical management with planned follow up.  Patient endorses 100% compliance with home regimen.  He states that beginning approximately 3 days ago he began to experience worsening shortness of breath that initially began with ambulation only.  Patient was previously able to ambulate without limitations prior to 3 days ago.  He started breath approximate 20 ft which progressively worsened to shortness of breath at rest with worsening orthopnea.  He normally rest with 2 pillows underneath him at night and had add another 2 pillows beneath him in order to sleep.  He also notes experiencing been bendopnea over the last 3 days as well.  Yesterday evening patient experienced new onset left apical sharp, stabbing 6/10 chest pain and worsening shortness of breath.  Symptoms persisted for multiple hours as patient was unable to sleep prompting wife to bring patient to ED for evaluation at that time.     Interval History:   Patient with febrile episodes overnight along with this AM  with T-max 102.8°.  Of note, patient's blood cultures have resulted with Gram-positive cocci with BCID2 panel revealing Staph aureus.  Patient was started on vancomycin overnight and tolerating it well.  Patient reports feeling much improved and requesting to go home today.  Explained to patient that he has a severe bloodstream infection and would recommend against leaving hospital at this time.    Review of Systems   Constitutional:  Positive for fever. Negative for chills.   HENT:  Negative for congestion, sinus pain and sore throat.    Eyes:  Negative for blurred vision and double vision.   Respiratory:  Positive for cough and shortness of breath. Negative for sputum production and wheezing.    Cardiovascular:  Negative for chest pain, palpitations and leg swelling.   Gastrointestinal:  Negative for abdominal pain, nausea and vomiting.   Genitourinary:  Negative for dysuria.   Musculoskeletal:  Negative for myalgias.   Neurological:  Negative for dizziness, focal weakness, seizures and weakness.           Objective:     Vital Signs (Most Recent):  Temp: (!) 100.6 °F (38.1 °C) (08/24/24 0425)  Pulse: 68 (08/24/24 0425)  Resp: 18 (08/24/24 0425)  BP: (!) 153/96 (08/24/24 0425)  SpO2: 95 % (08/24/24 0425) Vital Signs (24h Range):  Temp:  [98.2 °F (36.8 °C)-101.9 °F (38.8 °C)] 100.6 °F (38.1 °C)  Pulse:  [63-79] 68  Resp:  [17-26] 18  SpO2:  [94 %-97 %] 95 %  BP: (119-167)/() 153/96     Physical Exam  Constitutional:       Appearance: Normal appearance.   HENT:      Head: Normocephalic and atraumatic.      Nose: Nose normal.      Mouth/Throat:      Mouth: Mucous membranes are moist.      Pharynx: Oropharynx is clear.   Eyes:      Conjunctiva/sclera: Conjunctivae normal.      Pupils: Pupils are equal, round, and reactive to light.   Cardiovascular:      Rate and Rhythm: Normal rate and regular rhythm.      Pulses: Normal pulses.      Heart sounds: No murmur heard.  Pulmonary:      Effort: Pulmonary effort is  normal. No respiratory distress.      Breath sounds: No wheezing.   Chest:      Chest wall: No tenderness.   Abdominal:      General: Abdomen is flat. Bowel sounds are normal. There is no distension.      Palpations: Abdomen is soft.      Tenderness: There is no abdominal tenderness.   Musculoskeletal:         General: No swelling. Normal range of motion.      Cervical back: Normal range of motion.   Skin:     General: Skin is warm.   Neurological:      General: No focal deficit present.      Mental Status: He is alert and oriented to person, place, and time.          Laboratory:  Most Recent Data:  CBC:   Recent Labs   Lab 08/23/24  0419 08/24/24  0520   WBC 11.57* 10.50   HGB 14.5 17.9   HCT 42.1 52.5*    255     CMP:   Recent Labs   Lab 08/24/24  0520   CALCIUM 10.7*   ALBUMIN 3.5   *   K 3.8   CO2 29   CL 94*   BUN 11.6   CREATININE 1.22*   ALKPHOS 105   ALT 23   AST 22   BILITOT 1.6*         Microbiology Data Reviewed: yes  Pertinent Findings:  Blood culture w/ Staph aureus    Other Results:  EKG (my interpretation): normal EKG, normal sinus rhythm, unchanged from previous tracings.    Radiology:  Imaging Results              CTA Chest Non-Coronary (PE Studies) (Final result)  Result time 08/23/24 07:09:23      Final result by Carlos Georges MD (08/23/24 07:09:23)                   Impression:      1. Negative for pulmonary thromboembolic disease.  2. Patchy bilateral ground-glass opacities suspicious for infection though I suspect an element of pulmonary edema as well given septal thickening.  3. Trace right pleural effusion.  4. No significant discrepancy with the preliminary report.      Electronically signed by: Carlos Georges  Date:    08/23/2024  Time:    07:09               Narrative:    EXAMINATION:  CTA CHEST NON CORONARY (PE STUDIES)    CLINICAL HISTORY:  Pulmonary embolism (PE) suspected, positive D-dimer;    TECHNIQUE:  Helical acquisition through the chest with IV contrast targeting  the pulmonary arteries. Multiplanar and 3D MIP reconstructed images were provided for review.  mGycm. Automatic exposure control, adjustment of mA/kV or iterative reconstruction technique was used to reduce radiation.    COMPARISON:  None available.    FINDINGS:  There is good opacification of the pulmonary arterial tree. There is no evidence of pulmonary thromboembolism.  There is no aortic dissection.    There is no mediastinal, hilar or axillary lymphadenopathy by size criteria.    Heart is not significantly enlarged.  There is no pericardial effusion.  There are coronary artery calcifications and probably stents.    There is trace right pleural effusion.  There are patchy ground-glass predominant opacities in both lungs, right greater than left.  No dense consolidation.  There is some septal thickening.    There are no acute findings in the imaged upper abdomen.  There is a small hiatal hernia.  There are no acute osseous findings.                        Preliminary result by Carmelo Jacobo Jr., MD (08/23/24 05:54:33)                   Impression:    1. No filling defects are seen in the pulmonary arteries to suggest pulmonary embolus.  2. There are scattered reticular, nodular and patchy grounglass opacities in both lungs, more pronounced in the right. This is consistent with multifocal possibly atypical pneumonia. Correlate clinically as regards further evaluation and follow up.  3. No CT evidence of pulmonary embolism. Details and other findings as discussed above.               Narrative:    START OF REPORT:  Technique: CT Scan of the chest was performed with intravenous contrast with direct axial images as well as sagittal and coronal reconstruction images pulmonary embolus protocol.    Dosage Information: Automated Exposure Control was utilized.    Comparison: Correlation is with study [id number] 05:18:01.    Clinical History: Pulmonary embolism (PE) suspected, positive  D-dimer.    Findings:  Soft Tissues: Unremarkable.  Lines and Tubes: None.  Neck: The visualized soft tissues of the neck appear unremarkable.  Mediastinum: The mediastinal structures are within normal limits.  Heart: The heart appears unremarkable. Cardiac pacer leads are seen in the right atrium and right ventricle.  Aorta: No aortic dissection or aneurysm is seen.  Pulmonary Arteries: Unremarkable. No filling defects are seen in the pulmonary arteries to suggest pulmonary embolus.  Lungs: Mild streaky linear opacity is seen predominantly in the dependent portions at the lung bases consistent with dependent changes scarring and subsegmental atelectasis. There are scattered reticular, nodular and patchy grounglass opacities in both lungs, more pronounced in the right. This is consistent with multifocal possibly atypical pneumonia.  Pleura: No effusions or pneumothorax are identified.  Bony Structures:  Spine: Subtle spondylolytic changes are seen in the thoracic spine.  Ribs: The bilateral ribs appear unremarkable.  Abdomen: A 2.5 cm cyst is noted in the left kidney.                                         X-Ray Chest AP Portable (Final result)  Result time 08/23/24 06:11:39      Final result by Carlos Georges MD (08/23/24 06:11:39)                   Impression:      Bilateral interstitial predominant opacities, infection versus edema.      Electronically signed by: Carlos Georges  Date:    08/23/2024  Time:    06:11               Narrative:    EXAMINATION:  XR CHEST AP PORTABLE    CLINICAL HISTORY:  Dyspnea, unspecified    COMPARISON:  24 July 2024    FINDINGS:  Frontal view of the chest was obtained. There is left-sided AICD.  The heart is enlarged.  There are bilateral interstitial predominant opacities.  No pneumothorax.                        Wet Read by Fito Michaels MD (08/23/24 05:22:01, [facility], Emergency Medicine)    Cardiomegaly with moderate pulmonary vascular  congestion;                                    Current Medications:     Infusions:       Scheduled:   amiodarone  200 mg Oral QAM    atorvastatin  80 mg Oral Daily    carvediloL  6.25 mg Oral BID    furosemide (LASIX) injection  40 mg Intravenous BID    heparin (porcine)  5,000 Units Subcutaneous Q12H    sacubitriL-valsartan  1 tablet Oral BID    vancomycin (VANCOCIN) IV (PEDS and ADULTS)  1,500 mg Intravenous Q12H        PRN:    Current Facility-Administered Medications:     acetaminophen, 650 mg, Oral, Q6H PRN    cetirizine, 10 mg, Oral, Daily PRN    dextrose 10%, 12.5 g, Intravenous, PRN    dextrose 10%, 25 g, Intravenous, PRN    glucagon (human recombinant), 1 mg, Intramuscular, PRN    glucose, 16 g, Oral, PRN    glucose, 24 g, Oral, PRN    labetalol, 10 mg, Intravenous, Q4H PRN    melatonin, 6 mg, Oral, Nightly PRN    naloxone, 0.02 mg, Intravenous, PRN    nicotine, 1 patch, Transdermal, Daily PRN    nitroGLYCERIN, 0.4 mg, Sublingual, Q5 Min PRN    ondansetron, 8 mg, Oral, Q8H PRN    polyethylene glycol, 17 g, Oral, Daily PRN    prochlorperazine, 5 mg, Intravenous, Q6H PRN    senna-docusate 8.6-50 mg, 1 tablet, Oral, BID PRN    simethicone, 1 tablet, Oral, TID PRN    sodium chloride 0.9%, 10 mL, Intravenous, PRN    Pharmacy to dose Vancomycin consult, , , Once **AND** vancomycin - pharmacy to dose, , Intravenous, pharmacy to manage frequency    Antibiotics and Day Number of Therapy:  Vancomycin      Intake/Output Summary (Last 24 hours) at 8/24/2024 0612  Last data filed at 8/23/2024 2119  Gross per 24 hour   Intake --   Output 1400 ml   Net -1400 ml       Lines/Drains/Airways       Peripheral Intravenous Line  Duration                  Peripheral IV - Single Lumen 08/23/24 0427 18 G Anterior;Distal;Left Upper Arm 1 day                    Assessment & Plan:     Staph aureus bacteremia  Suspect pneumonia  -SIRS initially 1/4 (RR 20)  -Lactic acid normal in the ED  -Febrile episode overnight  -Blood cultures on  (8/23/24) positive for gram positive cocci; BCID2 panel with Staph aureus  -CTA chest showed bilateral ground glass opacity suspicious for infection  -Will hold off on further diuresis with lasix  -TTE (8/23/24) negative for vegetations  -Currently on Vancomycin  -Will need repeat blood cultures tomorrow   -Will need ID consult on Monday due to Staph bacteremia    CAD s/p stent placement 2008  Vfib s/p ICD placement  -Reported history of stent  -LHC on 7/25/24 with chronically occluded OM  -Echo (8/23/24) showed EF 28%  -Will hold off on DAPT due to stent years ago  -Continue Amiodarone 200 mg daily  -Continue GDMT with Entresto and Coreg  -Will attempt to start Farxiga per last Cardiology note    Hypertension  -Currently on Entresto and Coreg  -Continue to monitor    Hyperlipidemia  -Continue on Atorvastatin 80 mg daily    CODE STATUS: Full Code  Access: Peripheral  Antibiotics: Vancomycin  Diet: Cardiac  DVT Prophylaxis: Heparin  GI Prophylaxis: none needed    Disposition: day 0 of admission for  staph bacteremia.  Currently awaiting Infectious Disease evaluation on Monday.    Sudhakar Caicedo MD  [facility], -III

## 2024-08-25 LAB
ALBUMIN SERPL-MCNC: 2.9 G/DL (ref 3.5–5)
ALBUMIN/GLOB SERPL: 0.6 RATIO (ref 1.1–2)
ALP SERPL-CCNC: 96 UNIT/L (ref 40–150)
ALT SERPL-CCNC: 29 UNIT/L (ref 0–55)
ANION GAP SERPL CALC-SCNC: 11 MEQ/L
AST SERPL-CCNC: 24 UNIT/L (ref 5–34)
BACTERIA BLD CULT: ABNORMAL
BACTERIA BLD CULT: ABNORMAL
BACTERIA SPEC CULT: NORMAL
BASOPHILS # BLD AUTO: 0.04 X10(3)/MCL
BASOPHILS NFR BLD AUTO: 0.6 %
BILIRUB SERPL-MCNC: 0.9 MG/DL
BUN SERPL-MCNC: 17.8 MG/DL (ref 8.4–25.7)
CALCIUM SERPL-MCNC: 9.8 MG/DL (ref 8.4–10.2)
CHLORIDE SERPL-SCNC: 91 MMOL/L (ref 98–107)
CO2 SERPL-SCNC: 29 MMOL/L (ref 22–29)
CREAT SERPL-MCNC: 1.29 MG/DL (ref 0.73–1.18)
CREAT/UREA NIT SERPL: 14
EOSINOPHIL # BLD AUTO: 0.01 X10(3)/MCL (ref 0–0.9)
EOSINOPHIL NFR BLD AUTO: 0.1 %
ERYTHROCYTE [DISTWIDTH] IN BLOOD BY AUTOMATED COUNT: 12.8 % (ref 11.5–17)
GFR SERPLBLD CREATININE-BSD FMLA CKD-EPI: >60 ML/MIN/1.73/M2
GLOBULIN SER-MCNC: 4.6 GM/DL (ref 2.4–3.5)
GLUCOSE SERPL-MCNC: 115 MG/DL (ref 74–100)
GRAM STN SPEC: ABNORMAL
GRAM STN SPEC: NORMAL
HCT VFR BLD AUTO: 47 % (ref 42–52)
HGB BLD-MCNC: 16.3 G/DL (ref 14–18)
IMM GRANULOCYTES # BLD AUTO: 0.02 X10(3)/MCL (ref 0–0.04)
IMM GRANULOCYTES NFR BLD AUTO: 0.3 %
LYMPHOCYTES # BLD AUTO: 0.63 X10(3)/MCL (ref 0.6–4.6)
LYMPHOCYTES NFR BLD AUTO: 8.8 %
MAGNESIUM SERPL-MCNC: 2.4 MG/DL (ref 1.6–2.6)
MCH RBC QN AUTO: 31.2 PG (ref 27–31)
MCHC RBC AUTO-ENTMCNC: 34.7 G/DL (ref 33–36)
MCV RBC AUTO: 89.9 FL (ref 80–94)
MONOCYTES # BLD AUTO: 0.91 X10(3)/MCL (ref 0.1–1.3)
MONOCYTES NFR BLD AUTO: 12.8 %
NEUTROPHILS # BLD AUTO: 5.51 X10(3)/MCL (ref 2.1–9.2)
NEUTROPHILS NFR BLD AUTO: 77.4 %
NRBC BLD AUTO-RTO: 0 %
OHS QRS DURATION: 112 MS
OHS QTC CALCULATION: 456 MS
PHOSPHATE SERPL-MCNC: 3.3 MG/DL (ref 2.3–4.7)
PLATELET # BLD AUTO: 258 X10(3)/MCL (ref 130–400)
PMV BLD AUTO: 10.1 FL (ref 7.4–10.4)
POTASSIUM SERPL-SCNC: 3.9 MMOL/L (ref 3.5–5.1)
PROT SERPL-MCNC: 7.5 GM/DL (ref 6.4–8.3)
RBC # BLD AUTO: 5.23 X10(6)/MCL (ref 4.7–6.1)
SODIUM SERPL-SCNC: 131 MMOL/L (ref 136–145)
VANCOMYCIN TROUGH SERPL-MCNC: 12.3 UG/ML (ref 15–20)
WBC # BLD AUTO: 7.12 X10(3)/MCL (ref 4.5–11.5)

## 2024-08-25 PROCEDURE — 84100 ASSAY OF PHOSPHORUS: CPT

## 2024-08-25 PROCEDURE — 94761 N-INVAS EAR/PLS OXIMETRY MLT: CPT

## 2024-08-25 PROCEDURE — 25000003 PHARM REV CODE 250

## 2024-08-25 PROCEDURE — 36415 COLL VENOUS BLD VENIPUNCTURE: CPT

## 2024-08-25 PROCEDURE — 87077 CULTURE AEROBIC IDENTIFY: CPT

## 2024-08-25 PROCEDURE — 80053 COMPREHEN METABOLIC PANEL: CPT

## 2024-08-25 PROCEDURE — 85025 COMPLETE CBC W/AUTO DIFF WBC: CPT

## 2024-08-25 PROCEDURE — 63600175 PHARM REV CODE 636 W HCPCS

## 2024-08-25 PROCEDURE — 83735 ASSAY OF MAGNESIUM: CPT

## 2024-08-25 PROCEDURE — G0378 HOSPITAL OBSERVATION PER HR: HCPCS

## 2024-08-25 PROCEDURE — 80202 ASSAY OF VANCOMYCIN: CPT

## 2024-08-25 PROCEDURE — 96372 THER/PROPH/DIAG INJ SC/IM: CPT

## 2024-08-25 PROCEDURE — 87184 SC STD DISK METHOD PER PLATE: CPT

## 2024-08-25 PROCEDURE — 87040 BLOOD CULTURE FOR BACTERIA: CPT

## 2024-08-25 PROCEDURE — 87154 CUL TYP ID BLD PTHGN 6+ TRGT: CPT

## 2024-08-25 RX ADMIN — OXACILLIN 2 G: 2 INJECTION, POWDER, FOR SOLUTION INTRAMUSCULAR; INTRAVENOUS at 02:08

## 2024-08-25 RX ADMIN — SACUBITRIL AND VALSARTAN 1 TABLET: 24; 26 TABLET, FILM COATED ORAL at 09:08

## 2024-08-25 RX ADMIN — HEPARIN SODIUM 5000 UNITS: 5000 INJECTION, SOLUTION INTRAVENOUS; SUBCUTANEOUS at 08:08

## 2024-08-25 RX ADMIN — ATORVASTATIN CALCIUM 80 MG: 40 TABLET, FILM COATED ORAL at 08:08

## 2024-08-25 RX ADMIN — HEPARIN SODIUM 5000 UNITS: 5000 INJECTION, SOLUTION INTRAVENOUS; SUBCUTANEOUS at 09:08

## 2024-08-25 RX ADMIN — VANCOMYCIN HYDROCHLORIDE 1500 MG: 1.5 INJECTION, POWDER, LYOPHILIZED, FOR SOLUTION INTRAVENOUS at 01:08

## 2024-08-25 RX ADMIN — OXACILLIN 2 G: 2 INJECTION, POWDER, FOR SOLUTION INTRAMUSCULAR; INTRAVENOUS at 10:08

## 2024-08-25 RX ADMIN — ACETAMINOPHEN 1000 MG: 500 TABLET ORAL at 02:08

## 2024-08-25 RX ADMIN — AMIODARONE HYDROCHLORIDE 200 MG: 200 TABLET ORAL at 06:08

## 2024-08-25 RX ADMIN — CARVEDILOL 6.25 MG: 3.12 TABLET, FILM COATED ORAL at 08:08

## 2024-08-25 RX ADMIN — OXACILLIN 2 G: 2 INJECTION, POWDER, FOR SOLUTION INTRAMUSCULAR; INTRAVENOUS at 06:08

## 2024-08-25 RX ADMIN — CARVEDILOL 6.25 MG: 3.12 TABLET, FILM COATED ORAL at 09:08

## 2024-08-25 NOTE — PROGRESS NOTES
[facility] Wards   Progress Note     Resident Team: [facility] Medicine List 3  Attending Physician: Liam Strong MD  Resident: Marifer  Intern: Merlin     Subjective:      Brief HPI:  Eliseo Denson is a 54 y.o.  male with PMH of hypertension, hyperlipidemia, CAD status post stent placement (unknown vessels), NSTEMI, ischemic cardiomyopathy, heart failure reduced ejection fraction (EF 30%), who presented to [facility] ED (8/23/2024) due to shortness of breath.  Patient states that he was previously hospitalized approximately 1 month ago.  He presented to [facility] ED and was found to have a myocardial infarction at that time.  He suffered a run of V-tach while in ED and was subsequently defibrillated then transferred to our [facility] where he underwent left heart catheterization.  Left heart catheterization negative for obstructive heart disease of native valves and/or stents.  Patient was subsequently discharged with medical management with planned follow up.  Patient endorses 100% compliance with home regimen.  He states that beginning approximately 3 days ago he began to experience worsening shortness of breath that initially began with ambulation only.  Patient was previously able to ambulate without limitations prior to 3 days ago.  He started breath approximate 20 ft which progressively worsened to shortness of breath at rest with worsening orthopnea.  He normally rest with 2 pillows underneath him at night and had add another 2 pillows beneath him in order to sleep.  He also notes experiencing been bendopnea over the last 3 days as well.  Yesterday evening patient experienced new onset left apical sharp, stabbing 6/10 chest pain and worsening shortness of breath.  Symptoms persisted for multiple hours as patient was unable to sleep prompting wife to bring patient to ED for evaluation at that time.     Interval History:   Patient AF and VSS; NAEON; patient reports feeling well this  AM, and has no complaints. According to nurse, patient's ICD site bandage was present since placed, and had much old serosanguinous discharge dried on the bandage. Patient's blood cultures have been + for MSSA, and negative for MecA. Currently on vancomycin, will likely de-escalate today. Will speak to ID to determine if ICD needs to be removed. ICD site appears dry and intact this AM. Will repeat blood cultures today as well. Na 131, Cr 1.3      Review of Systems   Constitutional:  Negative for chills and fever.   HENT:  Negative for congestion, sinus pain and sore throat.    Eyes:  Negative for blurred vision and double vision.   Respiratory:  Negative for cough, sputum production, shortness of breath and wheezing.    Cardiovascular:  Negative for chest pain, palpitations and leg swelling.   Gastrointestinal:  Negative for abdominal pain, nausea and vomiting.   Genitourinary:  Negative for dysuria.   Musculoskeletal:  Negative for myalgias.   Neurological:  Negative for dizziness, focal weakness, seizures and weakness.           Objective:     Vital Signs (Most Recent):  Temp: 98.8 °F (37.1 °C) (08/25/24 0738)  Pulse: 61 (08/25/24 0738)  Resp: 18 (08/25/24 0738)  BP: (!) 148/89 (08/25/24 0858)  SpO2: (!) 94 % (08/25/24 0738) Vital Signs (24h Range):  Temp:  [98.6 °F (37 °C)-99.9 °F (37.7 °C)] 98.8 °F (37.1 °C)  Pulse:  [61-76] 61  Resp:  [18-20] 18  SpO2:  [93 %-98 %] 94 %  BP: (115-148)/(79-89) 148/89     Physical Exam  Constitutional:       Appearance: Normal appearance.   HENT:      Head: Normocephalic and atraumatic.      Nose: Nose normal.      Mouth/Throat:      Mouth: Mucous membranes are moist.      Pharynx: Oropharynx is clear.   Eyes:      Conjunctiva/sclera: Conjunctivae normal.      Pupils: Pupils are equal, round, and reactive to light.   Cardiovascular:      Rate and Rhythm: Normal rate and regular rhythm.      Pulses: Normal pulses.      Heart sounds: No murmur heard.  Pulmonary:      Effort:  Pulmonary effort is normal. No respiratory distress.      Breath sounds: No wheezing.   Chest:      Chest wall: No tenderness.   Abdominal:      General: Abdomen is flat. Bowel sounds are normal. There is no distension.      Palpations: Abdomen is soft.      Tenderness: There is no abdominal tenderness.   Musculoskeletal:         General: No swelling. Normal range of motion.      Cervical back: Normal range of motion.   Skin:     General: Skin is warm.      Comments: ICD site at L upper clavical region appears clean/dry/intact   Neurological:      General: No focal deficit present.      Mental Status: He is alert and oriented to person, place, and time.          Laboratory:  Most Recent Data:  CBC:   Recent Labs   Lab 08/24/24  0520 08/25/24  0345   WBC 10.50 7.12   HGB 17.9 16.3   HCT 52.5* 47.0    258     CMP:   Recent Labs   Lab 08/25/24  0512   CALCIUM 9.8   ALBUMIN 2.9*   *   K 3.9   CO2 29   CL 91*   BUN 17.8   CREATININE 1.29*   ALKPHOS 96   ALT 29   AST 24   BILITOT 0.9         Microbiology Data Reviewed: yes  Pertinent Findings:  Blood culture w/ Staph aureus, MecA negative    Other Results:  EKG (my interpretation): normal EKG, normal sinus rhythm, unchanged from previous tracings.    Radiology:  Imaging Results              CTA Chest Non-Coronary (PE Studies) (Final result)  Result time 08/23/24 07:09:23      Final result by Carlos Georges MD (08/23/24 07:09:23)                   Impression:      1. Negative for pulmonary thromboembolic disease.  2. Patchy bilateral ground-glass opacities suspicious for infection though I suspect an element of pulmonary edema as well given septal thickening.  3. Trace right pleural effusion.  4. No significant discrepancy with the preliminary report.      Electronically signed by: Carlos Georges  Date:    08/23/2024  Time:    07:09               Narrative:    EXAMINATION:  CTA CHEST NON CORONARY (PE STUDIES)    CLINICAL HISTORY:  Pulmonary embolism (PE)  suspected, positive D-dimer;    TECHNIQUE:  Helical acquisition through the chest with IV contrast targeting the pulmonary arteries. Multiplanar and 3D MIP reconstructed images were provided for review.  mGycm. Automatic exposure control, adjustment of mA/kV or iterative reconstruction technique was used to reduce radiation.    COMPARISON:  None available.    FINDINGS:  There is good opacification of the pulmonary arterial tree. There is no evidence of pulmonary thromboembolism.  There is no aortic dissection.    There is no mediastinal, hilar or axillary lymphadenopathy by size criteria.    Heart is not significantly enlarged.  There is no pericardial effusion.  There are coronary artery calcifications and probably stents.    There is trace right pleural effusion.  There are patchy ground-glass predominant opacities in both lungs, right greater than left.  No dense consolidation.  There is some septal thickening.    There are no acute findings in the imaged upper abdomen.  There is a small hiatal hernia.  There are no acute osseous findings.                        Preliminary result by Carmelo Jacobo Jr., MD (08/23/24 05:54:33)                   Impression:    1. No filling defects are seen in the pulmonary arteries to suggest pulmonary embolus.  2. There are scattered reticular, nodular and patchy grounglass opacities in both lungs, more pronounced in the right. This is consistent with multifocal possibly atypical pneumonia. Correlate clinically as regards further evaluation and follow up.  3. No CT evidence of pulmonary embolism. Details and other findings as discussed above.               Narrative:    START OF REPORT:  Technique: CT Scan of the chest was performed with intravenous contrast with direct axial images as well as sagittal and coronal reconstruction images pulmonary embolus protocol.    Dosage Information: Automated Exposure Control was utilized.    Comparison: Correlation is with study  dated2024-08-23 05:18:01.    Clinical History: Pulmonary embolism (PE) suspected, positive D-dimer.    Findings:  Soft Tissues: Unremarkable.  Lines and Tubes: None.  Neck: The visualized soft tissues of the neck appear unremarkable.  Mediastinum: The mediastinal structures are within normal limits.  Heart: The heart appears unremarkable. Cardiac pacer leads are seen in the right atrium and right ventricle.  Aorta: No aortic dissection or aneurysm is seen.  Pulmonary Arteries: Unremarkable. No filling defects are seen in the pulmonary arteries to suggest pulmonary embolus.  Lungs: Mild streaky linear opacity is seen predominantly in the dependent portions at the lung bases consistent with dependent changes scarring and subsegmental atelectasis. There are scattered reticular, nodular and patchy grounglass opacities in both lungs, more pronounced in the right. This is consistent with multifocal possibly atypical pneumonia.  Pleura: No effusions or pneumothorax are identified.  Bony Structures:  Spine: Subtle spondylolytic changes are seen in the thoracic spine.  Ribs: The bilateral ribs appear unremarkable.  Abdomen: A 2.5 cm cyst is noted in the left kidney.                                         X-Ray Chest AP Portable (Final result)  Result time 08/23/24 06:11:39      Final result by Carlos Georges MD (08/23/24 06:11:39)                   Impression:      Bilateral interstitial predominant opacities, infection versus edema.      Electronically signed by: Carlos Georges  Date:    08/23/2024  Time:    06:11               Narrative:    EXAMINATION:  XR CHEST AP PORTABLE    CLINICAL HISTORY:  Dyspnea, unspecified    COMPARISON:  24 July 2024    FINDINGS:  Frontal view of the chest was obtained. There is left-sided AICD.  The heart is enlarged.  There are bilateral interstitial predominant opacities.  No pneumothorax.                        Wet Read by Fito Michaels MD (08/23/24 05:22:01, [facility] -  Emergency Dept, Emergency Medicine)    Cardiomegaly with moderate pulmonary vascular congestion;                                    Current Medications:     Infusions:       Scheduled:   amiodarone  200 mg Oral QAM    atorvastatin  80 mg Oral Daily    carvediloL  6.25 mg Oral BID    heparin (porcine)  5,000 Units Subcutaneous Q12H    oxacillin IV (PEDS and ADULTS)  2 g Intravenous Q4H    sacubitriL-valsartan  1 tablet Oral BID        PRN:    Current Facility-Administered Medications:     acetaminophen, 1,000 mg, Oral, Q6H PRN    cetirizine, 10 mg, Oral, Daily PRN    dextrose 10%, 12.5 g, Intravenous, PRN    dextrose 10%, 25 g, Intravenous, PRN    glucagon (human recombinant), 1 mg, Intramuscular, PRN    glucose, 16 g, Oral, PRN    glucose, 24 g, Oral, PRN    labetalol, 10 mg, Intravenous, Q4H PRN    melatonin, 6 mg, Oral, Nightly PRN    naloxone, 0.02 mg, Intravenous, PRN    nicotine, 1 patch, Transdermal, Daily PRN    nitroGLYCERIN, 0.4 mg, Sublingual, Q5 Min PRN    ondansetron, 8 mg, Oral, Q8H PRN    polyethylene glycol, 17 g, Oral, Daily PRN    prochlorperazine, 5 mg, Intravenous, Q6H PRN    senna-docusate 8.6-50 mg, 1 tablet, Oral, BID PRN    simethicone, 1 tablet, Oral, TID PRN    sodium chloride 0.9%, 10 mL, Intravenous, PRN    Pharmacy to dose Vancomycin consult, , , Once **AND** vancomycin - pharmacy to dose, , Intravenous, pharmacy to manage frequency    Antibiotics and Day Number of Therapy:  Stop vancomycin, started oxacillin day 1      Intake/Output Summary (Last 24 hours) at 8/25/2024 0916  Last data filed at 8/25/2024 0417  Gross per 24 hour   Intake 647.38 ml   Output 1770 ml   Net -1122.62 ml       Lines/Drains/Airways       Peripheral Intravenous Line  Duration                  Peripheral IV - Single Lumen 08/23/24 0427 18 G Anterior;Distal;Left Upper Arm 2 days                    Assessment & Plan:     Staph aureus bacteremia  Suspect pneumonia  -SIRS initially 1/4 (RR 20)  -Lactic acid normal in  the ED  -afebrile, VSS, HDS  -Blood cultures on (8/23/24) positive for gram positive cocci; BCID2 panel with Staph aureus  -CTA chest showed bilateral ground glass opacity suspicious for infection  -Will hold off on further diuresis with lasix  -TTE (8/23/24) negative for vegetations  -BCID negative for MecA, repeat blood cultures today due to 48hrs after +  -switched vanc to oxacillin  -Will need ID consult on Monday due to Staph bacteremia, possible need to remove ICD    CAD s/p stent placement 2008  Vfib s/p ICD placement  -Reported history of stent  -LHC on 7/25/24 with chronically occluded OM  -Echo (8/23/24) showed EF 28%  -Will hold off on DAPT due to stent years ago  -Continue Amiodarone 200 mg daily  -Continue GDMT with Entresto and Coreg  -Will attempt to start Farxiga per last Cardiology note    Hypertension  -Currently on Entresto and Coreg  -Continue to monitor    Hyperlipidemia  -Continue on Atorvastatin 80 mg daily    CODE STATUS: Full Code  Access: Peripheral  Antibiotics: Vancomycin  Diet: Cardiac  DVT Prophylaxis: Heparin  GI Prophylaxis: none needed    Disposition: day 2 of admission for  staph bacteremia.  Currently awaiting Infectious Disease evaluation on Monday.    Jayro Hamilton MD  [employer] [facility], PGY-1

## 2024-08-26 LAB
ACINETOBACTER CALCOACETICUS-BAUMANNII COMPLEX (OHS): NOT DETECTED
ALBUMIN SERPL-MCNC: 2.9 G/DL (ref 3.5–5)
ALBUMIN/GLOB SERPL: 0.6 RATIO (ref 1.1–2)
ALP SERPL-CCNC: 106 UNIT/L (ref 40–150)
ALT SERPL-CCNC: 35 UNIT/L (ref 0–55)
ANION GAP SERPL CALC-SCNC: 10 MEQ/L
AST SERPL-CCNC: 37 UNIT/L (ref 5–34)
BACTEROIDES FRAGILIS (OHS): NOT DETECTED
BASOPHILS # BLD AUTO: 0.03 X10(3)/MCL
BASOPHILS NFR BLD AUTO: 0.6 %
BILIRUB SERPL-MCNC: 0.5 MG/DL
BUN SERPL-MCNC: 21.2 MG/DL (ref 8.4–25.7)
C AURIS DNA BLD POS QL NAA+NON-PROBE: NOT DETECTED
C GATTII+NEOFOR DNA CSF QL NAA+NON-PROBE: NOT DETECTED
C TRACH DNA SPEC QL NAA+PROBE: NOT DETECTED
CALCIUM SERPL-MCNC: 9.7 MG/DL (ref 8.4–10.2)
CANDIDA ALBICANS (OHS): NOT DETECTED
CANDIDA GLABRATA (OHS): NOT DETECTED
CANDIDA KRUSEI (OHS): NOT DETECTED
CANDIDA PARAPSILOSIS (OHS): NOT DETECTED
CANDIDA TROPICALIS (OHS): NOT DETECTED
CHLORIDE SERPL-SCNC: 96 MMOL/L (ref 98–107)
CO2 SERPL-SCNC: 29 MMOL/L (ref 22–29)
CREAT SERPL-MCNC: 1.25 MG/DL (ref 0.73–1.18)
CREAT/UREA NIT SERPL: 17
CTX-M (OHS): ABNORMAL
ENTEROBACTER CLOACAE COMPLEX (OHS): NOT DETECTED
ENTEROBACTERALES (OHS): NOT DETECTED
ENTEROCOCCUS FAECALIS (OHS): NOT DETECTED
ENTEROCOCCUS FAECIUM (OHS): NOT DETECTED
EOSINOPHIL # BLD AUTO: 0.09 X10(3)/MCL (ref 0–0.9)
EOSINOPHIL NFR BLD AUTO: 1.9 %
ERYTHROCYTE [DISTWIDTH] IN BLOOD BY AUTOMATED COUNT: 12.7 % (ref 11.5–17)
ESCHERICHIA COLI (OHS): NOT DETECTED
GFR SERPLBLD CREATININE-BSD FMLA CKD-EPI: >60 ML/MIN/1.73/M2
GLOBULIN SER-MCNC: 4.6 GM/DL (ref 2.4–3.5)
GLUCOSE SERPL-MCNC: 121 MG/DL (ref 74–100)
GP B STREP DNA CSF QL NAA+NON-PROBE: NOT DETECTED
HAEM INFLU DNA CSF QL NAA+NON-PROBE: NOT DETECTED
HAV IGM SERPL QL IA: NONREACTIVE
HBV CORE IGM SERPL QL IA: NONREACTIVE
HBV SURFACE AB SER-ACNC: 0.23 MIU/ML
HBV SURFACE AB SERPL IA-ACNC: NONREACTIVE M[IU]/ML
HBV SURFACE AG SERPL QL IA: NONREACTIVE
HCT VFR BLD AUTO: 53 % (ref 42–52)
HCV AB SERPL QL IA: NONREACTIVE
HGB BLD-MCNC: 17.7 G/DL (ref 14–18)
IMM GRANULOCYTES # BLD AUTO: 0.01 X10(3)/MCL (ref 0–0.04)
IMM GRANULOCYTES NFR BLD AUTO: 0.2 %
IMP (OHS): ABNORMAL
KLEBSIELLA AEROGENES (OHS): NOT DETECTED
KLEBSIELLA OXYTOCA (OHS): NOT DETECTED
KLEBSIELLA PNEUMONIAE GROUP (OHS): NOT DETECTED
KPC (OHS): ABNORMAL
L MONOCYTOG DNA CSF QL NAA+NON-PROBE: NOT DETECTED
LYMPHOCYTES # BLD AUTO: 0.7 X10(3)/MCL (ref 0.6–4.6)
LYMPHOCYTES NFR BLD AUTO: 14.9 %
MAGNESIUM SERPL-MCNC: 2.6 MG/DL (ref 1.6–2.6)
MCH RBC QN AUTO: 30.2 PG (ref 27–31)
MCHC RBC AUTO-ENTMCNC: 33.4 G/DL (ref 33–36)
MCR-1 (OHS): ABNORMAL
MCV RBC AUTO: 90.4 FL (ref 80–94)
MECA/C (OHS): ABNORMAL
MECA/C AND MREJ (MRSA)(OHS): NOT DETECTED
MONOCYTES # BLD AUTO: 0.79 X10(3)/MCL (ref 0.1–1.3)
MONOCYTES NFR BLD AUTO: 16.8 %
N GONORRHOEA DNA SPEC QL NAA+PROBE: NOT DETECTED
N MEN DNA CSF QL NAA+NON-PROBE: NOT DETECTED
NDM (OHS): ABNORMAL
NEUTROPHILS # BLD AUTO: 3.07 X10(3)/MCL (ref 2.1–9.2)
NEUTROPHILS NFR BLD AUTO: 65.6 %
NRBC BLD AUTO-RTO: 0 %
OXA-48-LIKE (OHS): ABNORMAL
PHOSPHATE SERPL-MCNC: 3 MG/DL (ref 2.3–4.7)
PLATELET # BLD AUTO: 301 X10(3)/MCL (ref 130–400)
PMV BLD AUTO: 9.7 FL (ref 7.4–10.4)
POTASSIUM SERPL-SCNC: 3.6 MMOL/L (ref 3.5–5.1)
PROT SERPL-MCNC: 7.5 GM/DL (ref 6.4–8.3)
PROTEUS SPP. (OHS): NOT DETECTED
PSEUDOMONAS AERUGINOSA (OHS): NOT DETECTED
RBC # BLD AUTO: 5.86 X10(6)/MCL (ref 4.7–6.1)
S ENT+BONG DNA STL QL NAA+NON-PROBE: NOT DETECTED
S PNEUM DNA CSF QL NAA+NON-PROBE: NOT DETECTED
SERRATIA MARCESCENS (OHS): NOT DETECTED
SODIUM SERPL-SCNC: 135 MMOL/L (ref 136–145)
SOURCE (OHS): NORMAL
STAPHYLOCOCCUS AUREUS (OHS): DETECTED
STAPHYLOCOCCUS EPIDERMIDIS (OHS): NOT DETECTED
STAPHYLOCOCCUS LUGDUNENSIS (OHS): NOT DETECTED
STAPHYLOCOCCUS SPP. (OHS): DETECTED
STENOTROPHOMONAS MALTOPHILIA (OHS): NOT DETECTED
STREPTOCOCCUS PYOGENES (GROUP A)(OHS): NOT DETECTED
STREPTOCOCCUS SPP. (OHS): NOT DETECTED
VANA/B (OHS): ABNORMAL
VIM (OHS): ABNORMAL
WBC # BLD AUTO: 4.69 X10(3)/MCL (ref 4.5–11.5)

## 2024-08-26 PROCEDURE — 85025 COMPLETE CBC W/AUTO DIFF WBC: CPT

## 2024-08-26 PROCEDURE — 87591 N.GONORRHOEAE DNA AMP PROB: CPT | Performed by: NURSE PRACTITIONER

## 2024-08-26 PROCEDURE — 36415 COLL VENOUS BLD VENIPUNCTURE: CPT

## 2024-08-26 PROCEDURE — 84100 ASSAY OF PHOSPHORUS: CPT

## 2024-08-26 PROCEDURE — 96372 THER/PROPH/DIAG INJ SC/IM: CPT

## 2024-08-26 PROCEDURE — 21400001 HC TELEMETRY ROOM

## 2024-08-26 PROCEDURE — 25000003 PHARM REV CODE 250

## 2024-08-26 PROCEDURE — 86706 HEP B SURFACE ANTIBODY: CPT | Performed by: NURSE PRACTITIONER

## 2024-08-26 PROCEDURE — 83735 ASSAY OF MAGNESIUM: CPT

## 2024-08-26 PROCEDURE — 87491 CHLMYD TRACH DNA AMP PROBE: CPT | Performed by: NURSE PRACTITIONER

## 2024-08-26 PROCEDURE — 63600175 PHARM REV CODE 636 W HCPCS

## 2024-08-26 PROCEDURE — 80053 COMPREHEN METABOLIC PANEL: CPT

## 2024-08-26 PROCEDURE — 94761 N-INVAS EAR/PLS OXIMETRY MLT: CPT

## 2024-08-26 PROCEDURE — 80074 ACUTE HEPATITIS PANEL: CPT | Performed by: NURSE PRACTITIONER

## 2024-08-26 RX ORDER — GUAIFENESIN AND DEXTROMETHORPHAN HYDROBROMIDE 10; 100 MG/5ML; MG/5ML
10 SYRUP ORAL EVERY 6 HOURS PRN
Status: DISCONTINUED | OUTPATIENT
Start: 2024-08-27 | End: 2024-08-27 | Stop reason: HOSPADM

## 2024-08-26 RX ADMIN — GUAIFENESIN AND DEXTROMETHORPHAN 10 ML: 100; 10 SYRUP ORAL at 11:08

## 2024-08-26 RX ADMIN — OXACILLIN 2 G: 2 INJECTION, POWDER, FOR SOLUTION INTRAMUSCULAR; INTRAVENOUS at 06:08

## 2024-08-26 RX ADMIN — OXACILLIN 2 G: 2 INJECTION, POWDER, FOR SOLUTION INTRAMUSCULAR; INTRAVENOUS at 02:08

## 2024-08-26 RX ADMIN — SACUBITRIL AND VALSARTAN 1 TABLET: 24; 26 TABLET, FILM COATED ORAL at 10:08

## 2024-08-26 RX ADMIN — HEPARIN SODIUM 5000 UNITS: 5000 INJECTION, SOLUTION INTRAVENOUS; SUBCUTANEOUS at 09:08

## 2024-08-26 RX ADMIN — OXACILLIN 2 G: 2 INJECTION, POWDER, FOR SOLUTION INTRAMUSCULAR; INTRAVENOUS at 10:08

## 2024-08-26 RX ADMIN — HEPARIN SODIUM 5000 UNITS: 5000 INJECTION, SOLUTION INTRAVENOUS; SUBCUTANEOUS at 10:08

## 2024-08-26 RX ADMIN — ATORVASTATIN CALCIUM 80 MG: 40 TABLET, FILM COATED ORAL at 09:08

## 2024-08-26 RX ADMIN — CARVEDILOL 6.25 MG: 3.12 TABLET, FILM COATED ORAL at 10:08

## 2024-08-26 RX ADMIN — AMIODARONE HYDROCHLORIDE 200 MG: 200 TABLET ORAL at 06:08

## 2024-08-26 RX ADMIN — CARVEDILOL 6.25 MG: 3.12 TABLET, FILM COATED ORAL at 09:08

## 2024-08-26 NOTE — PROGRESS NOTES
Inpatient Nutrition Evaluation    Admit Date: 8/23/2024   Total duration of encounter: 3 days   Patient Age: 54 y.o.    Nutrition Recommendation/Prescription     Heart Healthy diet  Monitor Weight Weekly     Nutrition Assessment     Chart Review    Reason Seen: continuous nutrition monitoring    Malnutrition Screening Tool Results   Have you recently lost weight without trying?: No  Have you been eating poorly because of a decreased appetite?: No   MST Score: 0   Diagnosis:  Staph aureus bacteremia, suspect pneumonia, CAD, Vfib, HTN, HLD, HF    Relevant Medical History: HTN, HLD, CAD, NSTEMI, HF    Scheduled Medications:  amiodarone, 200 mg, QAM  atorvastatin, 80 mg, Daily  carvediloL, 6.25 mg, BID  heparin (porcine), 5,000 Units, Q12H  oxacillin IV (PEDS and ADULTS), 2 g, Q4H  sacubitriL-valsartan, 1 tablet, BID    Continuous Infusions:   PRN Medications:   Current Facility-Administered Medications:     acetaminophen, 1,000 mg, Oral, Q6H PRN    cetirizine, 10 mg, Oral, Daily PRN    dextrose 10%, 12.5 g, Intravenous, PRN    dextrose 10%, 25 g, Intravenous, PRN    glucagon (human recombinant), 1 mg, Intramuscular, PRN    glucose, 16 g, Oral, PRN    glucose, 24 g, Oral, PRN    labetalol, 10 mg, Intravenous, Q4H PRN    melatonin, 6 mg, Oral, Nightly PRN    naloxone, 0.02 mg, Intravenous, PRN    nicotine, 1 patch, Transdermal, Daily PRN    nitroGLYCERIN, 0.4 mg, Sublingual, Q5 Min PRN    ondansetron, 8 mg, Oral, Q8H PRN    polyethylene glycol, 17 g, Oral, Daily PRN    prochlorperazine, 5 mg, Intravenous, Q6H PRN    senna-docusate 8.6-50 mg, 1 tablet, Oral, BID PRN    simethicone, 1 tablet, Oral, TID PRN    sodium chloride 0.9%, 10 mL, Intravenous, PRN    Recent Labs   Lab 08/23/24  0419 08/23/24  1221 08/24/24  0520 08/25/24  0345 08/25/24  0512 08/26/24  0620 08/26/24  0624     --  134*  --  131*  --  135*   K 3.7  --  3.8  --  3.9  --  3.6   CALCIUM 9.4  --  10.7*  --  9.8  --  9.7   PHOS  --   --  3.8  --  3.3   "--  3.0   MG 1.80  --  2.40  --  2.40  --  2.60   CO2 22  --  29  --  29  --  29   BUN 11.5  --  11.6  --  17.8  --  21.2   CREATININE 1.00  --  1.22*  --  1.29*  --  1.25*   EGFRNORACEVR >60  --  >60  --  >60  --  >60   GLUCOSE 163*  --  124*  --  115*  --  121*   BILITOT 1.0  --  1.6*  --  0.9  --  0.5   ALKPHOS 91  --  105  --  96  --  106   ALT 23  --  23  --  29  --  35   AST 19  --  22  --  24  --  37*   ALBUMIN 3.1*  --  3.5  --  2.9*  --  2.9*   HGBA1C  --  5.7  --   --   --   --   --    WBC 11.57*  --  10.50 7.12  --  4.69  --    HGB 14.5  --  17.9 16.3  --  17.7  --    HCT 42.1  --  52.5* 47.0  --  53.0*  --      Nutrition Orders:  Diet Heart Healthy      Appetite/Oral Intake: good/% of meals  Factors Affecting Nutritional Intake: none identified  Social Needs Impacting Access to Food: none identified  Food/Confucianism/Cultural Preferences: none reported  Food Allergies: no known food allergies  Last Bowel Movement: 24  Wound(s):  skin intact    Comments    24 -- Pt reports good appetite with no difficulty eating; denies wt loss reporting UBW as 236 lb; h/o CHF noted - encouraged low sodium diet & provided written informatoin    Anthropometrics    Height: 5' 11" (180.3 cm), Height Method: Stated  Last Weight: 107.3 kg (236 lb 8 oz) (24 0449), Weight Method: Bed Scale  BMI (Calculated): 33  BMI Classification: obese grade I (BMI 30-34.9)        Ideal Body Weight (IBW), Male: 172 lb     % Ideal Body Weight, Male (lb): 137.33 %                 Usual Body Weight (UBW), k.3 kg  % Usual Body Weight: 100.19     Usual Weight Provided By: patient    Wt Readings from Last 5 Encounters:   24 107.3 kg (236 lb 8 oz)   24 111.7 kg (246 lb 4.1 oz)     Weight Change(s) Since Admission:   Wt Readings from Last 1 Encounters:   24 0449 107.3 kg (236 lb 8 oz)   24 0415 107.7 kg (237 lb 8 oz)   24 0445 106.9 kg (235 lb 11.2 oz)   24 1132 107.1 kg (236 lb 3.2 oz) "   08/23/24 0355 102.1 kg (225 lb)   Admit Weight: 102.1 kg (225 lb) (08/23/24 0355), Weight Method: Estimated    Patient Education     Education Provided: low sodium diet  Teaching Method: explanation and printed materials  Comprehension: verbalizes understanding  Barriers to Learning: desire/motivation  Expected Compliance: fair  Comments: All questions were answered and dietitian's contact information was provided.     Nutrition Goals & Monitoring     Dietitian will monitor: food and beverage intake, weight, and food/nutrition knowledge skill  Discharge planning: continue Heart Healthy diet  Nutrition Risk/Follow-Up: low (follow-up in 5-7 days)  Patients assigned 'low nutrition risk' status do not qualify for a full nutritional assessment but will be monitored and re-evaluated in a 5-7 day time period. Please consult if re-evaluation needed sooner.
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
## 2024-08-26 NOTE — CONSULTS
[facility]   Inpatient Infectious Diseases Consultation    Physician requesting consultation: Liam Strong MD  Service requesting consultation: Internal Medicine  Reason for consultation: MSSA bacteremia    Historian: Patient and Electronic Medical Record    Isolation Status: No active isolations     HPI:     54 year old AA male with history HTN, HLD, CAD s/p PCI and stenting to LAD and D3 and  of mid Cx and OM3 with right to left collaterals, ICMO, HFrEF (EF 30%), s/p Dual Chamber ICD 7/26/2024 presented to [facility] with SOB. Was admitted 1 month prior with ICD placement in the setting of VT (VF) s/p Defibrillation at [facility]. Reportedly having SOB 3 day prior to ED evaluation. Was hypertensive in the ED with elevated BNP, volume overload with bilateral interstitial opacities on CXR. Noted to have febrile episode as high as 39.3, last yesterday morning 8/25/2024. He was started on oxacillin.  Patient was doing fine reportedly after discharge from [facility] but has had pocket tenderness at battery site worsening over the past month.  No fevers, chills.  He does endorse remote history of cocaine and opioid use but not within the past 30 years.  No IV drug use, no meth use.      Antibiotic / Antiviral / Antifungal history:    Antibiotics (From admission, onward)      Start     Stop Route Frequency Ordered    08/25/24 1000  oxacillin 2 g in 0.9% NaCl 100 mL IVPB         -- IV Every 4 hours (non-standard times) 08/25/24 0854              Past Medical History/Past Surgical History/Social History     History reviewed. No pertinent past medical history.    History reviewed. No pertinent surgical history.     FAMILY HISTORY:  family history is not on file.     SOCIAL HISTORY:   Social History     Socioeconomic History    Marital status:         ALLERGIES: Review of patient's allergies indicates:  No Known Allergies      Review of Systems     Review of Systems   Constitutional:   Positive for fever.   Respiratory:  Positive for shortness of breath.    Musculoskeletal:  Positive for neck pain.         MEDICATIONS:   Scheduled Meds:   amiodarone  200 mg Oral QAM    atorvastatin  80 mg Oral Daily    carvediloL  6.25 mg Oral BID    heparin (porcine)  5,000 Units Subcutaneous Q12H    oxacillin IV (PEDS and ADULTS)  2 g Intravenous Q4H    sacubitriL-valsartan  1 tablet Oral BID     Continuous Infusions:  PRN Meds:.  Current Facility-Administered Medications:     acetaminophen, 1,000 mg, Oral, Q6H PRN    cetirizine, 10 mg, Oral, Daily PRN    dextrose 10%, 12.5 g, Intravenous, PRN    dextrose 10%, 25 g, Intravenous, PRN    glucagon (human recombinant), 1 mg, Intramuscular, PRN    glucose, 16 g, Oral, PRN    glucose, 24 g, Oral, PRN    labetalol, 10 mg, Intravenous, Q4H PRN    melatonin, 6 mg, Oral, Nightly PRN    naloxone, 0.02 mg, Intravenous, PRN    nicotine, 1 patch, Transdermal, Daily PRN    nitroGLYCERIN, 0.4 mg, Sublingual, Q5 Min PRN    ondansetron, 8 mg, Oral, Q8H PRN    polyethylene glycol, 17 g, Oral, Daily PRN    prochlorperazine, 5 mg, Intravenous, Q6H PRN    senna-docusate 8.6-50 mg, 1 tablet, Oral, BID PRN    simethicone, 1 tablet, Oral, TID PRN    sodium chloride 0.9%, 10 mL, Intravenous, PRN    Physical exam:     Temp:  [97.7 °F (36.5 °C)-98.3 °F (36.8 °C)] 98 °F (36.7 °C)  Pulse:  [56-65] 65  Resp:  [18-20] 18  SpO2:  [94 %-97 %] 97 %  BP: (101-122)/(64-81) 122/74     GENERAL: A&Ox3, NAD  HEENT: atraumatic, normocephalic, anicteric, moist oral mucosa without lesions, exudate, or erythema  LUNGS: breathing unlabored, lungs CTA bilateral  HEART: RRR; no murmur, rub, or gallop  ABDOMEN: abdomen soft, nondistended, BS noted x 4 quadrants, no tympany, no rebound, guarding, or organomegaly  : no CVA tenderness  EXTREMITIES: no edema, clubbing, or cyanosis   SKIN:  ICD pocket demonstrates swelling, warmth though no drainage  NEURO: speech fluent and intact, facial symmetry preserved, no  tremor  PSYCH: cooperative, normal mood and affect        LABS:     I have personally reviewed patient's labs.  Pertinent results noted below.    CBC  Recent Labs     08/24/24  0520 08/25/24  0345 08/26/24  0620   WBC 10.50 7.12 4.69   HGB 17.9 16.3 17.7   HCT 52.5* 47.0 53.0*    258 301       Differential  Recent Labs   Lab 08/26/24 0620   WBC 4.69   HGB 17.7   HCT 53.0*           Basic Metabolic Panel  Recent Labs     08/24/24  0520 08/25/24  0512 08/26/24  0624   * 131* 135*   K 3.8 3.9 3.6   CL 94* 91* 96*   CO2 29 29 29   BUN 11.6 17.8 21.2   CREATININE 1.22* 1.29* 1.25*        Hepatic Panel  Lab Results   Component Value Date    ALT 35 08/26/2024    AST 37 (H) 08/26/2024    ALKPHOS 106 08/26/2024    BILITOT 0.5 08/26/2024        Urinalysis:  Results for orders placed or performed during the hospital encounter of 07/24/24   Urinalysis, Reflex to Urine Culture    Specimen: Urine   Result Value Ref Range    Color, UA Light-Yellow Yellow, Light-Yellow, Dark Yellow, Ema, Straw    Appearance, UA Clear Clear    Specific Gravity, UA 1.019 1.005 - 1.030    pH, UA 5.5 5.0 - 8.5    Protein, UA 2+ (A) Negative    Glucose, UA Normal Negative, Normal    Ketones, UA Negative Negative    Blood, UA Trace (A) Negative    Bilirubin, UA Negative Negative    Urobilinogen, UA Normal 0.2, 1.0, Normal    Nitrites, UA Negative Negative    Leukocyte Esterase, UA Negative Negative    RBC, UA 0-5 None Seen, 0-2, 3-5, 0-5 /HPF    WBC, UA 0-5 None Seen, 0-2, 3-5, 0-5 /HPF    Bacteria, UA None Seen None Seen /HPF    Squamous Epithelial Cells, UA None Seen None Seen /HPF    Mucous, UA Trace (A) None Seen /LPF    Hyaline Casts, UA 0-2 (A) None Seen /lpf       Estimated Creatinine Clearance: 84.2 mL/min (A) (based on SCr of 1.25 mg/dL (H)).     ESR:  No results found for this or any previous visit.   CRP:  No results found for this or any previous visit.        MICRO AND PATHOLOGY:   Colonization:  Results for orders  placed or performed during the hospital encounter of 08/23/24   MRSA PCR   Result Value Ref Range    MRSA PCR Screen Not Detected Not Detected    Narrative    The Xpert MRSA Assay utilizes automated real-time polymerase chain reaction (PCR) to detect MRSA DNA.  A positive test result does not necessarily indicate the presence of viable organism.  It is however, presumptive for the presence of MRSA.           IMAGING:     I have personally reviewed patient's imaging. Pertinent results noted below.  CTA Chest Non-Coronary (PE Studies)   Final Result      1. Negative for pulmonary thromboembolic disease.   2. Patchy bilateral ground-glass opacities suspicious for infection though I suspect an element of pulmonary edema as well given septal thickening.   3. Trace right pleural effusion.   4. No significant discrepancy with the preliminary report.         Electronically signed by: Carlos Georges   Date:    08/23/2024   Time:    07:09      X-Ray Chest AP Portable   ED Interpretation   Cardiomegaly with moderate pulmonary vascular congestion;      Final Result      Bilateral interstitial predominant opacities, infection versus edema.         Electronically signed by: Carlos Georges   Date:    08/23/2024   Time:    06:11            IMPRESSION     1) MSSA bacteremia  2) ICD infection  3) Sepsis 2/2 above  4) Hx of VF s/p Defibrillation s/p ICD Placement 7/26  5) ICMO  6) HFrEF 30%  7) CAD with prior stenting to LAD and D3  8) HTN  9) HLD    Patient recently had dual-chamber ICD placed on 07/26/2024 at [facility] have sustaining an MI which resulted in ICMO and HFrEF.  Initially he reported some pain to the generator insertion site that eventually resolved, however about 1 week ago he noted redness/swelling/pain to the area which has been constant.  He also noted some discharge from a wound that developed over the generator pocket.  Eventually he became short of breath presented to the ED for evaluation was noted to have MSSA  bacteremia in 2/2 sets.  Repeated blood cultures on 08/25 have also been positive for MSSA in 2/2 sets as well indicating a high burden of bacteremia.    On exam there are no other wounds aside from a small wound to his left upper chest wall over his generator.  He denies any joint pains or back pain.    Suspect the etiology of his bacteremia is likely from an infected ICD.  TTE was completed which showed no evidence of IE however did note poor view the tricuspid valve. Recommend patient undergo TATY as well as ICD removal. Cardiology consult has been placed.    RECOMMENDATIONS:    - Repeat blood cultures X 2 every 48 hours until clearance   - Recommend TATY to to establish CIED pocket infection versus CIED lead/native valve IE  - Continue Oxacillin 2g IV q4h  - Recommend removal of ICD:  timing for reimplantation of device we will depend on findings of TATY  - ICD is the most likley source of his bacteremia  - Please avoid central line insertions until blood cx are NG >72h    Thank you for the consult. We will follow along with you. Please do not hesitate to call with any questions or concerns.     Odell Denis MD  [facility] INTERNAL MEDICINE PGY-3  08/26/2024 8:05 AM  [facility]      Attending attestation    I hereby attest that I have verified the Resident's documentation or findings by personally performing the physical exam and medical decision making activities of the Evaluation and Management service to be billed. Note has been edited to reflect updated assessment and plan.    Laurent Hurst MD  Infectious Diseases Faculty   of Medicine

## 2024-08-26 NOTE — PROGRESS NOTES
[facility] Medicine Wards   Progress Note     Resident Team: [facility] Medicine List 3  Attending Physician: Liam Strong MD  Resident: Marifer  Intern: Merlin     Subjective:      Brief HPI:  Eliseo Denson is a 54 y.o.  male with PMH of hypertension, hyperlipidemia, CAD status post stent placement (unknown vessels), NSTEMI, ischemic cardiomyopathy, heart failure reduced ejection fraction (EF 30%), who presented to [facility] ED (8/23/2024) due to shortness of breath.  Patient states that he was previously hospitalized approximately 1 month ago.  He presented to [facility] ED and was found to have a myocardial infarction at that time.  He suffered a run of V-tach while in ED and was subsequently defibrillated then transferred to our [facility] where he underwent left heart catheterization.  Left heart catheterization negative for obstructive heart disease of native valves and/or stents.  Patient was subsequently discharged with medical management with planned follow up.  Patient endorses 100% compliance with home regimen.  He states that beginning approximately 3 days ago he began to experience worsening shortness of breath that initially began with ambulation only.  Patient was previously able to ambulate without limitations prior to 3 days ago.  He started breath approximate 20 ft which progressively worsened to shortness of breath at rest with worsening orthopnea.  He normally rest with 2 pillows underneath him at night and had add another 2 pillows beneath him in order to sleep.  He also notes experiencing been bendopnea over the last 3 days as well.  Yesterday evening patient experienced new onset left apical sharp, stabbing 6/10 chest pain and worsening shortness of breath.  Symptoms persisted for multiple hours as patient was unable to sleep prompting wife to bring patient to ED for evaluation at that time.     Interval History:   AF, VSS, NAEON, patient feeling well today and has no  complaints. Reports that his ICD site has slight drainage occasionally. Also, that it has been painful intermittently since procedure, never completely resolved. In addition, reports that entresto has made him lightheaded in the past. Agreeable to continuing low dose for now after counseled about risks and benefits. 2nd set of blood cultures has 2 out of 2 positive for MSSE. Will continue oxacillin for now and patient understands that he needs to stay in hospital for treatment and that he needs ICD removed. No complaints, breathing well, no swelling. On room air with O2 sats 97%. Cr 1.25, downtrending.        Review of Systems   Constitutional:  Negative for chills and fever.   HENT:  Negative for congestion, sinus pain and sore throat.    Eyes:  Negative for blurred vision and double vision.   Respiratory:  Negative for cough, sputum production, shortness of breath and wheezing.    Cardiovascular:  Negative for chest pain, palpitations and leg swelling.   Gastrointestinal:  Negative for abdominal pain, nausea and vomiting.   Genitourinary:  Negative for dysuria.   Musculoskeletal:  Negative for myalgias.   Neurological:  Negative for dizziness, focal weakness, seizures and weakness.           Objective:     Vital Signs (Most Recent):  Temp: 98 °F (36.7 °C) (08/26/24 0738)  Pulse: 65 (08/26/24 0800)  Resp: 18 (08/26/24 0738)  BP: 122/74 (08/26/24 1022)  SpO2: 97 % (08/26/24 0800) Vital Signs (24h Range):  Temp:  [97.7 °F (36.5 °C)-98.3 °F (36.8 °C)] 98 °F (36.7 °C)  Pulse:  [56-65] 65  Resp:  [18-20] 18  SpO2:  [94 %-97 %] 97 %  BP: (101-122)/(64-81) 122/74     Physical Exam  Constitutional:       Appearance: Normal appearance.   HENT:      Head: Normocephalic and atraumatic.      Nose: Nose normal.      Mouth/Throat:      Mouth: Mucous membranes are moist.      Pharynx: Oropharynx is clear.   Eyes:      Conjunctiva/sclera: Conjunctivae normal.      Pupils: Pupils are equal, round, and reactive to light.    Cardiovascular:      Rate and Rhythm: Normal rate and regular rhythm.      Pulses: Normal pulses.      Heart sounds: No murmur heard.  Pulmonary:      Effort: Pulmonary effort is normal. No respiratory distress.      Breath sounds: No wheezing.   Chest:      Chest wall: No tenderness.   Abdominal:      General: Abdomen is flat. Bowel sounds are normal. There is no distension.      Palpations: Abdomen is soft.      Tenderness: There is no abdominal tenderness.   Musculoskeletal:         General: No swelling. Normal range of motion.      Cervical back: Normal range of motion.   Skin:     General: Skin is warm.      Comments: ICD site at L upper clavical region with mild serous drainage around site, non-tender currently.   Neurological:      General: No focal deficit present.      Mental Status: He is alert and oriented to person, place, and time.          Laboratory:  Most Recent Data:  CBC:   Recent Labs   Lab 08/25/24  0345 08/26/24  0620   WBC 7.12 4.69   HGB 16.3 17.7   HCT 47.0 53.0*    301     CMP:   Recent Labs   Lab 08/26/24  0624   CALCIUM 9.7   ALBUMIN 2.9*   *   K 3.6   CO2 29   CL 96*   BUN 21.2   CREATININE 1.25*   ALKPHOS 106   ALT 35   AST 37*   BILITOT 0.5         Microbiology Data Reviewed: yes  Pertinent Findings:  Blood culture w/ Staph aureus, MecA negative  -repeat blood cultures same, 2/2    Other Results:  EKG (my interpretation): normal EKG, normal sinus rhythm, unchanged from previous tracings.    Radiology:  Imaging Results              CTA Chest Non-Coronary (PE Studies) (Final result)  Result time 08/23/24 07:09:23      Final result by Carlos Georges MD (08/23/24 07:09:23)                   Impression:      1. Negative for pulmonary thromboembolic disease.  2. Patchy bilateral ground-glass opacities suspicious for infection though I suspect an element of pulmonary edema as well given septal thickening.  3. Trace right pleural effusion.  4. No significant discrepancy with  the preliminary report.      Electronically signed by: Carlos Georges  Date:    08/23/2024  Time:    07:09               Narrative:    EXAMINATION:  CTA CHEST NON CORONARY (PE STUDIES)    CLINICAL HISTORY:  Pulmonary embolism (PE) suspected, positive D-dimer;    TECHNIQUE:  Helical acquisition through the chest with IV contrast targeting the pulmonary arteries. Multiplanar and 3D MIP reconstructed images were provided for review.  mGycm. Automatic exposure control, adjustment of mA/kV or iterative reconstruction technique was used to reduce radiation.    COMPARISON:  None available.    FINDINGS:  There is good opacification of the pulmonary arterial tree. There is no evidence of pulmonary thromboembolism.  There is no aortic dissection.    There is no mediastinal, hilar or axillary lymphadenopathy by size criteria.    Heart is not significantly enlarged.  There is no pericardial effusion.  There are coronary artery calcifications and probably stents.    There is trace right pleural effusion.  There are patchy ground-glass predominant opacities in both lungs, right greater than left.  No dense consolidation.  There is some septal thickening.    There are no acute findings in the imaged upper abdomen.  There is a small hiatal hernia.  There are no acute osseous findings.                        Preliminary result by Carmelo Jacobo Jr., MD (08/23/24 05:54:33)                   Impression:    1. No filling defects are seen in the pulmonary arteries to suggest pulmonary embolus.  2. There are scattered reticular, nodular and patchy grounglass opacities in both lungs, more pronounced in the right. This is consistent with multifocal possibly atypical pneumonia. Correlate clinically as regards further evaluation and follow up.  3. No CT evidence of pulmonary embolism. Details and other findings as discussed above.               Narrative:    START OF REPORT:  Technique: CT Scan of the chest was performed with  intravenous contrast with direct axial images as well as sagittal and coronal reconstruction images pulmonary embolus protocol.    Dosage Information: Automated Exposure Control was utilized.    Comparison: Correlation is with study [id number] 05:18:01.    Clinical History: Pulmonary embolism (PE) suspected, positive D-dimer.    Findings:  Soft Tissues: Unremarkable.  Lines and Tubes: None.  Neck: The visualized soft tissues of the neck appear unremarkable.  Mediastinum: The mediastinal structures are within normal limits.  Heart: The heart appears unremarkable. Cardiac pacer leads are seen in the right atrium and right ventricle.  Aorta: No aortic dissection or aneurysm is seen.  Pulmonary Arteries: Unremarkable. No filling defects are seen in the pulmonary arteries to suggest pulmonary embolus.  Lungs: Mild streaky linear opacity is seen predominantly in the dependent portions at the lung bases consistent with dependent changes scarring and subsegmental atelectasis. There are scattered reticular, nodular and patchy grounglass opacities in both lungs, more pronounced in the right. This is consistent with multifocal possibly atypical pneumonia.  Pleura: No effusions or pneumothorax are identified.  Bony Structures:  Spine: Subtle spondylolytic changes are seen in the thoracic spine.  Ribs: The bilateral ribs appear unremarkable.  Abdomen: A 2.5 cm cyst is noted in the left kidney.                                         X-Ray Chest AP Portable (Final result)  Result time 08/23/24 06:11:39      Final result by Carlos Georges MD (08/23/24 06:11:39)                   Impression:      Bilateral interstitial predominant opacities, infection versus edema.      Electronically signed by: Carlos Georges  Date:    08/23/2024  Time:    06:11               Narrative:    EXAMINATION:  XR CHEST AP PORTABLE    CLINICAL HISTORY:  Dyspnea, unspecified    COMPARISON:  24 July 2024    FINDINGS:  Frontal view of the chest was  obtained. There is left-sided AICD.  The heart is enlarged.  There are bilateral interstitial predominant opacities.  No pneumothorax.                        Wet Read by Fito Michaels MD (08/23/24 05:22:01, [facility], Emergency Medicine)    Cardiomegaly with moderate pulmonary vascular congestion;                                    Current Medications:     Infusions:       Scheduled:   amiodarone  200 mg Oral QAM    atorvastatin  80 mg Oral Daily    carvediloL  6.25 mg Oral BID    heparin (porcine)  5,000 Units Subcutaneous Q12H    oxacillin IV (PEDS and ADULTS)  2 g Intravenous Q4H    sacubitriL-valsartan  1 tablet Oral BID        PRN:    Current Facility-Administered Medications:     acetaminophen, 1,000 mg, Oral, Q6H PRN    cetirizine, 10 mg, Oral, Daily PRN    dextrose 10%, 12.5 g, Intravenous, PRN    dextrose 10%, 25 g, Intravenous, PRN    glucagon (human recombinant), 1 mg, Intramuscular, PRN    glucose, 16 g, Oral, PRN    glucose, 24 g, Oral, PRN    labetalol, 10 mg, Intravenous, Q4H PRN    melatonin, 6 mg, Oral, Nightly PRN    naloxone, 0.02 mg, Intravenous, PRN    nicotine, 1 patch, Transdermal, Daily PRN    nitroGLYCERIN, 0.4 mg, Sublingual, Q5 Min PRN    ondansetron, 8 mg, Oral, Q8H PRN    polyethylene glycol, 17 g, Oral, Daily PRN    prochlorperazine, 5 mg, Intravenous, Q6H PRN    senna-docusate 8.6-50 mg, 1 tablet, Oral, BID PRN    simethicone, 1 tablet, Oral, TID PRN    sodium chloride 0.9%, 10 mL, Intravenous, PRN    Antibiotics and Day Number of Therapy:  Stopped vancomycin, on oxacillin day 2      Intake/Output Summary (Last 24 hours) at 8/26/2024 1042  Last data filed at 8/26/2024 0739  Gross per 24 hour   Intake 930.06 ml   Output 550 ml   Net 380.06 ml       Lines/Drains/Airways       Peripheral Intravenous Line  Duration                  Peripheral IV - Single Lumen 08/25/24 2250 20 G Posterior;Right Forearm <1 day                    Assessment & Plan:      Staph aureus bacteremia  Suspect pneumonia  -SIRS initially 1/4 (RR 20)  -Lactic acid normal in the ED  -afebrile, VSS, HDS  -Blood cultures on (8/23/24) positive for gram positive cocci; BCID2 panel with Staph aureus  -CTA chest showed bilateral ground glass opacity suspicious for infection  -Will hold off on further diuresis with lasix  -TTE (8/23/24) negative for vegetations  -BCID negative for MecA, repeated blood cultures yesterday due to 48hrs after +; same findings  -on oxacillin day 2  -needs to have ICD removed, likely transfer, will speak to cardiology    CAD s/p stent placement 2008  Vfib s/p ICD placement  -Reported history of stent  -LHC on 7/25/24 with chronically occluded OM  -Echo (8/23/24) showed EF 28%  -Will hold off on DAPT due to stent years ago  -Continue Amiodarone 200 mg daily  -Continue GDMT with Entresto low dose and Coreg  -Will attempt to start Farxiga per last Cardiology note    Hypertension  -Currently on Entresto and Coreg  -Continue to monitor    Hyperlipidemia  -Continue on Atorvastatin 80 mg daily    CODE STATUS: Full Code  Access: Peripheral  Antibiotics: Vancomycin  Diet: Cardiac  DVT Prophylaxis: Heparin  GI Prophylaxis: none needed    Disposition: day 3 of admission for  staph bacteremia.  Currently awaiting Infectious Disease evaluation on Monday.    Jayro Hamilton MD  [facility] Internal Medicine, PGY-1

## 2024-08-27 ENCOUNTER — HOSPITAL ENCOUNTER (INPATIENT)
Facility: HOSPITAL | Age: 54
LOS: 14 days | Discharge: HOME HEALTH | DRG: 260 | End: 2024-09-10
Attending: INTERNAL MEDICINE | Admitting: INTERNAL MEDICINE
Payer: MEDICAID

## 2024-08-27 VITALS
HEIGHT: 71 IN | BODY MASS INDEX: 33.04 KG/M2 | OXYGEN SATURATION: 94 % | TEMPERATURE: 98 F | HEART RATE: 66 BPM | RESPIRATION RATE: 18 BRPM | WEIGHT: 236 LBS | SYSTOLIC BLOOD PRESSURE: 134 MMHG | DIASTOLIC BLOOD PRESSURE: 91 MMHG

## 2024-08-27 DIAGNOSIS — R78.81 BACTEREMIA: Primary | ICD-10-CM

## 2024-08-27 DIAGNOSIS — R78.81 MSSA BACTEREMIA: ICD-10-CM

## 2024-08-27 DIAGNOSIS — I50.9 CONGESTIVE HEART FAILURE, UNSPECIFIED HF CHRONICITY, UNSPECIFIED HEART FAILURE TYPE: ICD-10-CM

## 2024-08-27 DIAGNOSIS — T82.110D: ICD-10-CM

## 2024-08-27 DIAGNOSIS — B95.61 MSSA BACTEREMIA: ICD-10-CM

## 2024-08-27 DIAGNOSIS — T82.7XXA INFECTED PACEMAKER: ICD-10-CM

## 2024-08-27 DIAGNOSIS — R60.9 1+ PITTING EDEMA: ICD-10-CM

## 2024-08-27 DIAGNOSIS — R07.9 CHEST PAIN: ICD-10-CM

## 2024-08-27 PROBLEM — Z95.810 ICD (IMPLANTABLE CARDIOVERTER-DEFIBRILLATOR) IN PLACE: Status: ACTIVE | Noted: 2024-08-27

## 2024-08-27 PROBLEM — I50.20 HFREF (HEART FAILURE WITH REDUCED EJECTION FRACTION): Status: ACTIVE | Noted: 2024-08-27

## 2024-08-27 LAB
ALBUMIN SERPL-MCNC: 2.7 G/DL (ref 3.5–5)
ALBUMIN/GLOB SERPL: 0.6 RATIO (ref 1.1–2)
ALP SERPL-CCNC: 100 UNIT/L (ref 40–150)
ALT SERPL-CCNC: 28 UNIT/L (ref 0–55)
ANION GAP SERPL CALC-SCNC: 9 MEQ/L
AST SERPL-CCNC: 23 UNIT/L (ref 5–34)
BASOPHILS # BLD AUTO: 0.04 X10(3)/MCL
BASOPHILS NFR BLD AUTO: 0.8 %
BILIRUB SERPL-MCNC: 0.3 MG/DL
BUN SERPL-MCNC: 15.9 MG/DL (ref 8.4–25.7)
CALCIUM SERPL-MCNC: 9.1 MG/DL (ref 8.4–10.2)
CHLORIDE SERPL-SCNC: 100 MMOL/L (ref 98–107)
CO2 SERPL-SCNC: 27 MMOL/L (ref 22–29)
CREAT SERPL-MCNC: 1.21 MG/DL (ref 0.73–1.18)
CREAT/UREA NIT SERPL: 13
CRP SERPL-MCNC: 80.2 MG/L
EOSINOPHIL # BLD AUTO: 0.12 X10(3)/MCL (ref 0–0.9)
EOSINOPHIL NFR BLD AUTO: 2.3 %
ERYTHROCYTE [DISTWIDTH] IN BLOOD BY AUTOMATED COUNT: 12.5 % (ref 11.5–17)
ERYTHROCYTE [SEDIMENTATION RATE] IN BLOOD: 63 MM/HR (ref 0–15)
GFR SERPLBLD CREATININE-BSD FMLA CKD-EPI: >60 ML/MIN/1.73/M2
GLOBULIN SER-MCNC: 4.3 GM/DL (ref 2.4–3.5)
GLUCOSE SERPL-MCNC: 100 MG/DL (ref 74–100)
HAV AB SER QL IA: NONREACTIVE
HCT VFR BLD AUTO: 46.7 % (ref 42–52)
HGB BLD-MCNC: 16.1 G/DL (ref 14–18)
HIV 1+2 AB+HIV1 P24 AG SERPL QL IA: NONREACTIVE
HOLD SPECIMEN: NORMAL
IMM GRANULOCYTES # BLD AUTO: 0.02 X10(3)/MCL (ref 0–0.04)
IMM GRANULOCYTES NFR BLD AUTO: 0.4 %
LYMPHOCYTES # BLD AUTO: 1.17 X10(3)/MCL (ref 0.6–4.6)
LYMPHOCYTES NFR BLD AUTO: 22.8 %
MAGNESIUM SERPL-MCNC: 2.5 MG/DL (ref 1.6–2.6)
MCH RBC QN AUTO: 30.7 PG (ref 27–31)
MCHC RBC AUTO-ENTMCNC: 34.5 G/DL (ref 33–36)
MCV RBC AUTO: 89 FL (ref 80–94)
MONOCYTES # BLD AUTO: 1.08 X10(3)/MCL (ref 0.1–1.3)
MONOCYTES NFR BLD AUTO: 21.1 %
NEUTROPHILS # BLD AUTO: 2.7 X10(3)/MCL (ref 2.1–9.2)
NEUTROPHILS NFR BLD AUTO: 52.6 %
NRBC BLD AUTO-RTO: 0 %
PHOSPHATE SERPL-MCNC: 2.9 MG/DL (ref 2.3–4.7)
PLATELET # BLD AUTO: 307 X10(3)/MCL (ref 130–400)
PMV BLD AUTO: 9.9 FL (ref 7.4–10.4)
POTASSIUM SERPL-SCNC: 3.5 MMOL/L (ref 3.5–5.1)
PROT SERPL-MCNC: 7 GM/DL (ref 6.4–8.3)
RBC # BLD AUTO: 5.25 X10(6)/MCL (ref 4.7–6.1)
SODIUM SERPL-SCNC: 136 MMOL/L (ref 136–145)
T PALLIDUM AB SER QL: NONREACTIVE
WBC # BLD AUTO: 5.13 X10(3)/MCL (ref 4.5–11.5)

## 2024-08-27 PROCEDURE — 86780 TREPONEMA PALLIDUM: CPT | Performed by: NURSE PRACTITIONER

## 2024-08-27 PROCEDURE — 86708 HEPATITIS A ANTIBODY: CPT | Performed by: NURSE PRACTITIONER

## 2024-08-27 PROCEDURE — 11000001 HC ACUTE MED/SURG PRIVATE ROOM

## 2024-08-27 PROCEDURE — 25000003 PHARM REV CODE 250

## 2024-08-27 PROCEDURE — 25000003 PHARM REV CODE 250: Performed by: NURSE PRACTITIONER

## 2024-08-27 PROCEDURE — 86140 C-REACTIVE PROTEIN: CPT | Performed by: NURSE PRACTITIONER

## 2024-08-27 PROCEDURE — 63600175 PHARM REV CODE 636 W HCPCS

## 2024-08-27 PROCEDURE — 80053 COMPREHEN METABOLIC PANEL: CPT

## 2024-08-27 PROCEDURE — 36415 COLL VENOUS BLD VENIPUNCTURE: CPT | Performed by: NURSE PRACTITIONER

## 2024-08-27 PROCEDURE — 36415 COLL VENOUS BLD VENIPUNCTURE: CPT

## 2024-08-27 PROCEDURE — 94761 N-INVAS EAR/PLS OXIMETRY MLT: CPT

## 2024-08-27 PROCEDURE — 36415 COLL VENOUS BLD VENIPUNCTURE: CPT | Performed by: INTERNAL MEDICINE

## 2024-08-27 PROCEDURE — 85652 RBC SED RATE AUTOMATED: CPT | Performed by: NURSE PRACTITIONER

## 2024-08-27 PROCEDURE — 83735 ASSAY OF MAGNESIUM: CPT

## 2024-08-27 PROCEDURE — 63600175 PHARM REV CODE 636 W HCPCS: Performed by: NURSE PRACTITIONER

## 2024-08-27 PROCEDURE — 84100 ASSAY OF PHOSPHORUS: CPT

## 2024-08-27 PROCEDURE — 87389 HIV-1 AG W/HIV-1&-2 AB AG IA: CPT | Performed by: NURSE PRACTITIONER

## 2024-08-27 PROCEDURE — 85025 COMPLETE CBC W/AUTO DIFF WBC: CPT

## 2024-08-27 RX ORDER — NALOXONE HCL 0.4 MG/ML
0.02 VIAL (ML) INJECTION
Status: DISCONTINUED | OUTPATIENT
Start: 2024-08-27 | End: 2024-08-27

## 2024-08-27 RX ORDER — ENOXAPARIN SODIUM 100 MG/ML
40 INJECTION SUBCUTANEOUS EVERY 24 HOURS
Status: DISCONTINUED | OUTPATIENT
Start: 2024-08-28 | End: 2024-09-10 | Stop reason: HOSPADM

## 2024-08-27 RX ORDER — AMIODARONE HYDROCHLORIDE 200 MG/1
200 TABLET ORAL EVERY MORNING
Status: DISCONTINUED | OUTPATIENT
Start: 2024-08-28 | End: 2024-08-27

## 2024-08-27 RX ORDER — ATORVASTATIN CALCIUM 40 MG/1
80 TABLET, FILM COATED ORAL DAILY
Status: DISCONTINUED | OUTPATIENT
Start: 2024-08-28 | End: 2024-09-10 | Stop reason: HOSPADM

## 2024-08-27 RX ORDER — TRAMADOL HYDROCHLORIDE 50 MG/1
50 TABLET ORAL EVERY 6 HOURS PRN
Status: DISCONTINUED | OUTPATIENT
Start: 2024-08-27 | End: 2024-08-31 | Stop reason: SDUPTHER

## 2024-08-27 RX ORDER — ACETAMINOPHEN 500 MG
1000 TABLET ORAL EVERY 6 HOURS PRN
Status: DISCONTINUED | OUTPATIENT
Start: 2024-08-27 | End: 2024-09-10 | Stop reason: HOSPADM

## 2024-08-27 RX ORDER — HYDRALAZINE HYDROCHLORIDE 20 MG/ML
10 INJECTION INTRAMUSCULAR; INTRAVENOUS EVERY 4 HOURS PRN
Status: DISCONTINUED | OUTPATIENT
Start: 2024-08-27 | End: 2024-09-10 | Stop reason: HOSPADM

## 2024-08-27 RX ORDER — IBUPROFEN 200 MG
16 TABLET ORAL
Status: DISCONTINUED | OUTPATIENT
Start: 2024-08-27 | End: 2024-08-27

## 2024-08-27 RX ORDER — GUAIFENESIN AND DEXTROMETHORPHAN HYDROBROMIDE 10; 100 MG/5ML; MG/5ML
10 SYRUP ORAL EVERY 6 HOURS PRN
Status: DISCONTINUED | OUTPATIENT
Start: 2024-08-27 | End: 2024-09-10 | Stop reason: HOSPADM

## 2024-08-27 RX ORDER — CARVEDILOL 3.12 MG/1
6.25 TABLET ORAL 2 TIMES DAILY
Status: DISCONTINUED | OUTPATIENT
Start: 2024-08-27 | End: 2024-09-10 | Stop reason: HOSPADM

## 2024-08-27 RX ORDER — ONDANSETRON HYDROCHLORIDE 2 MG/ML
4 INJECTION, SOLUTION INTRAVENOUS EVERY 4 HOURS PRN
Status: DISCONTINUED | OUTPATIENT
Start: 2024-08-27 | End: 2024-09-10 | Stop reason: HOSPADM

## 2024-08-27 RX ORDER — GLUCAGON 1 MG
1 KIT INJECTION
Status: DISCONTINUED | OUTPATIENT
Start: 2024-08-27 | End: 2024-08-27

## 2024-08-27 RX ORDER — SODIUM CHLORIDE 0.9 % (FLUSH) 0.9 %
10 SYRINGE (ML) INJECTION
Status: DISCONTINUED | OUTPATIENT
Start: 2024-08-27 | End: 2024-09-10 | Stop reason: HOSPADM

## 2024-08-27 RX ORDER — BISACODYL 10 MG/1
10 SUPPOSITORY RECTAL DAILY PRN
Status: DISCONTINUED | OUTPATIENT
Start: 2024-08-27 | End: 2024-09-10 | Stop reason: HOSPADM

## 2024-08-27 RX ORDER — PROCHLORPERAZINE EDISYLATE 5 MG/ML
5 INJECTION INTRAMUSCULAR; INTRAVENOUS EVERY 6 HOURS PRN
Status: DISCONTINUED | OUTPATIENT
Start: 2024-08-27 | End: 2024-09-10 | Stop reason: HOSPADM

## 2024-08-27 RX ORDER — IBUPROFEN 200 MG
24 TABLET ORAL
Status: DISCONTINUED | OUTPATIENT
Start: 2024-08-27 | End: 2024-08-27

## 2024-08-27 RX ORDER — AMOXICILLIN 250 MG
1 CAPSULE ORAL 2 TIMES DAILY PRN
Status: DISCONTINUED | OUTPATIENT
Start: 2024-08-27 | End: 2024-09-10 | Stop reason: HOSPADM

## 2024-08-27 RX ORDER — AMIODARONE HYDROCHLORIDE 200 MG/1
200 TABLET ORAL DAILY
Status: DISCONTINUED | OUTPATIENT
Start: 2024-08-28 | End: 2024-09-10 | Stop reason: HOSPADM

## 2024-08-27 RX ORDER — ALUMINUM HYDROXIDE, MAGNESIUM HYDROXIDE, AND SIMETHICONE 1200; 120; 1200 MG/30ML; MG/30ML; MG/30ML
30 SUSPENSION ORAL 4 TIMES DAILY PRN
Status: DISCONTINUED | OUTPATIENT
Start: 2024-08-27 | End: 2024-09-10 | Stop reason: HOSPADM

## 2024-08-27 RX ORDER — TALC
6 POWDER (GRAM) TOPICAL NIGHTLY PRN
Status: DISCONTINUED | OUTPATIENT
Start: 2024-08-27 | End: 2024-08-30

## 2024-08-27 RX ADMIN — CARVEDILOL 6.25 MG: 3.12 TABLET, FILM COATED ORAL at 08:08

## 2024-08-27 RX ADMIN — SACUBITRIL AND VALSARTAN 1 TABLET: 24; 26 TABLET, FILM COATED ORAL at 08:08

## 2024-08-27 RX ADMIN — OXACILLIN 2 G: 2 INJECTION, POWDER, FOR SOLUTION INTRAMUSCULAR; INTRAVENOUS at 11:08

## 2024-08-27 RX ADMIN — OXACILLIN 2 G: 2 INJECTION, POWDER, FOR SOLUTION INTRAMUSCULAR; INTRAVENOUS at 02:08

## 2024-08-27 RX ADMIN — ATORVASTATIN CALCIUM 80 MG: 40 TABLET, FILM COATED ORAL at 08:08

## 2024-08-27 RX ADMIN — OXACILLIN 2 G: 2 INJECTION, POWDER, FOR SOLUTION INTRAMUSCULAR; INTRAVENOUS at 10:08

## 2024-08-27 RX ADMIN — AMIODARONE HYDROCHLORIDE 200 MG: 200 TABLET ORAL at 06:08

## 2024-08-27 RX ADMIN — HEPARIN SODIUM 5000 UNITS: 5000 INJECTION, SOLUTION INTRAVENOUS; SUBCUTANEOUS at 08:08

## 2024-08-27 RX ADMIN — OXACILLIN 2 G: 2 INJECTION, POWDER, FOR SOLUTION INTRAMUSCULAR; INTRAVENOUS at 08:08

## 2024-08-27 NOTE — MEDICAL/APP STUDENT
Cardiology Progress Note     Admitting Team:   Attending Physician: Liam Strong MD  Cardiology Attending Physician: Ronaldo Jefferson MD    Date of Admit: 2024    Reason for Consult: Shortness of Breath (Pt. C/o SOB that started last evening, also c/o L sided chest pain. Breathless in triage, RA 86%)       Subjective:      History of Present Illness:  Eliseo Denson is a 54 y.o. male with a PMHx of HFrEF (28%), ICD placement 24 s/p V-tach, CAD, HTN, HLD, ischemic cardiomyopathy, myocardial infarction in  presenting with acute exacerbation of CHF and Staphylococcus aureus bacteremia. Cardiology consulted for TATY and potential ICD removal.     Interval history:   Pt reports intermittent chest pain both at the region of his ICD which is infected and the L anterior chest. He denies lightheadedness and headache which was previously reported with Entresto. He denies night sweats that he experienced before. Pt denies fever, chills, shortness of breath, and tenderness to palpitation of the chest. Transfer request placed to [facility] for removal of ICD and is awaiting an open bed.    Current Outpatient Medications   Medication Instructions    amiodarone (PACERONE) 200 mg, Oral, Every morning    amLODIPine (NORVASC) 10 mg, Oral, Daily    JAUN CHEWABLE ASPIRIN 81 mg, Oral    clopidogreL (PLAVIX) 75 mg, Oral, Daily    losartan-hydrochlorothiazide 100-25 mg (HYZAAR) 100-25 mg per tablet 1 tablet, Oral, Daily    rosuvastatin (CRESTOR) 10 mg, Oral, Daily       Review of Systems: All systems have been reviewed and are negative unless otherwise noted in HPI.     Objective:   Last 24 Hour Vital Signs:  BP  Min: 119/77  Max: 149/99  Temp  Av.3 °F (36.8 °C)  Min: 97.9 °F (36.6 °C)  Max: 98.5 °F (36.9 °C)  Pulse  Av  Min: 61  Max: 72  Resp  Av.5  Min: 18  Max: 19  SpO2  Av.1 %  Min: 92 %  Max: 98 %  Weight  Av kg (236 lb)  Min: 107 kg (236 lb)  Max: 107 kg (236 lb)  Body mass index is  32.92 kg/m².  I/O last 3 completed shifts:  In: 788.8 [IV Piggyback:788.8]  Out: 550 [Urine:550]  Physical Examination:  Nursing note and vital signs reviewed.   Constitutional: NAD, not ill-appearing  HEENT: NCAT, PERRLA, normal conjunctivae  Cardiovascular: RRR, no murmurs noted, chest tenderness to palpation, normal pulses in radial & dorsalis pedis bilaterally  Pulmonary: normal respiratory effort, CTAB, no crackles, wheezes  Abdominal: S/NT/ND  Musculoskeletal: No edema noted to bilateral lower extremities   Neurological: Alert & oriented to self, location, time and situation. At baseline neurological function.  Skin: warm & dry. L chest incision with mild serous drainage around site with no tenderness.    Laboratory:  Recent Labs   Lab 08/25/24  0345 08/25/24  0512 08/26/24  0620 08/26/24  0624 08/27/24  0535   WBC 7.12  --  4.69  --  5.13   HGB 16.3  --  17.7  --  16.1   HCT 47.0  --  53.0*  --  46.7     --  301  --  307   MCV 89.9  --  90.4  --  89.0   RDW 12.8  --  12.7  --  12.5   NA  --  131*  --  135* 136   K  --  3.9  --  3.6 3.5   CL  --  91*  --  96* 100   CO2  --  29  --  29 27   BUN  --  17.8  --  21.2 15.9   CREATININE  --  1.29*  --  1.25* 1.21*   ALBUMIN  --  2.9*  --  2.9* 2.7*   BILITOT  --  0.9  --  0.5 0.3   AST  --  24  --  37* 23   ALKPHOS  --  96  --  106 100   ALT  --  29  --  35 28     Cardiac Data:  Recent Labs   Lab 08/23/24  0419 08/23/24  1221 08/23/24  1550   TROPONINI <0.010 0.013 0.014   .2*  --   --        Other Results:  EKG (my interpretation):sinus rhythm with occasional PVCs, left axis deviation, inferior infract, possible anterior infarct with unknown age, T wave abnormality, and possibility of lateral ischemia. Abnormal EKG    TTE:   Results for orders placed during the hospital encounter of 08/23/24    Echo    Interpretation Summary    Left Ventricle: The left ventricle is mildly dilated. Mildly increased wall thickness. There is severely reduced systolic  function. Biplane (2D) method of discs ejection fraction is 28%.    Right Ventricle: Normal right ventricular cavity size.    Left Atrium: Left atrium is mildly dilated.    Right Atrium: Right atrium is mildly dilated.    Aortic Valve: The aortic valve is a trileaflet valve. There is no stenosis. Aortic valve peak velocity is 1.24 m/s. Mean gradient is 3 mmHg.    Mitral Valve: The mitral valve is structurally normal. The mean pressure gradient across the mitral valve is 1 mmHg at a heart rate of 65 bpm.    Pulmonary Artery: The estimated pulmonary artery systolic pressure is 12 mmHg.    IVC/SVC: Normal venous pressure at 3 mmHg.      Stress Testing:   No results found for this or any previous visit.       Coronary Angiogram:   No results found for this or any previous visit.      Radiology:  CTA Chest Non-Coronary (PE Studies)   Final Result      1. Negative for pulmonary thromboembolic disease.   2. Patchy bilateral ground-glass opacities suspicious for infection though I suspect an element of pulmonary edema as well given septal thickening.   3. Trace right pleural effusion.   4. No significant discrepancy with the preliminary report.         Electronically signed by: Carlos Georges   Date:    08/23/2024   Time:    07:09      X-Ray Chest AP Portable   ED Interpretation   Cardiomegaly with moderate pulmonary vascular congestion;      Final Result      Bilateral interstitial predominant opacities, infection versus edema.         Electronically signed by: Carlos Georges   Date:    08/23/2024   Time:    06:11        Current Medications   amiodarone  200 mg Oral QAM    atorvastatin  80 mg Oral Daily    carvediloL  6.25 mg Oral BID    heparin (porcine)  5,000 Units Subcutaneous Q12H    oxacillin IV (PEDS and ADULTS)  2 g Intravenous Q4H    sacubitriL-valsartan  1 tablet Oral BID           Current Facility-Administered Medications:     acetaminophen, 1,000 mg, Oral, Q6H PRN    cetirizine, 10 mg, Oral, Daily PRN     dextromethorphan-guaiFENesin  mg/5 ml, 10 mL, Oral, Q6H PRN    dextrose 10%, 12.5 g, Intravenous, PRN    dextrose 10%, 25 g, Intravenous, PRN    glucagon (human recombinant), 1 mg, Intramuscular, PRN    glucose, 16 g, Oral, PRN    glucose, 24 g, Oral, PRN    labetalol, 10 mg, Intravenous, Q4H PRN    melatonin, 6 mg, Oral, Nightly PRN    naloxone, 0.02 mg, Intravenous, PRN    nicotine, 1 patch, Transdermal, Daily PRN    nitroGLYCERIN, 0.4 mg, Sublingual, Q5 Min PRN    ondansetron, 8 mg, Oral, Q8H PRN    polyethylene glycol, 17 g, Oral, Daily PRN    prochlorperazine, 5 mg, Intravenous, Q6H PRN    senna-docusate 8.6-50 mg, 1 tablet, Oral, BID PRN    simethicone, 1 tablet, Oral, TID PRN    sodium chloride 0.9%, 10 mL, Intravenous, PRN     Echo (TTE) on 8/23/24 showed dilated cardiomyopathy with the LV LA, and RA mildly dilated. There were no valvular abnormalities or pulmonary arterial hypertension.    Assessment:     Eliseo Denson is a 54 y.o. male with a PMHx of HFrEF (28%), ICD placement 7/25/24 s/p V-tach, CAD, HTN, HLD, ischemic cardiomyopathy, myocardial infarction in 2008 presenting with acute exacerbation of CHF and Staphylococcus aureus bacteremia.  Recently implanted ICD by Dr. Hussein appears infected is is likely the source of the patient's bacteremia. He may also have IE. TTE showed no vegetations or significant regurgitation. TATY and ICD removal indicated. ICD removal cannot be done at this hospital. Request transfer to [facility]; awaiting placement.       Plan:     MSSA bacteremia  Infected ICD (indication VF/VT)  Possible IE    -removal of infected ICD +/- TATY that cannot be done at this hospital  -transfer patient to [facility] that has the ability to remove ICD leads; discharge awaiting placement  -abx per ID      Elaina Khan, MS-3  [employer]    Krystal Montes, PGY-4  [facility] Cardiology Fellow

## 2024-08-27 NOTE — CARE UPDATE
Patient admitted for MSSA bacteremia 2/2 suspected infected ICD as source. Patient requires TATY to establish CIED pocket infection versus CIED lead/native valve IE with subsequent ICD removal per in house ID and cardiology services. ICD removal cannot be done at this facility and patient requires transfer at this time. [facility] transfer initiated this AM. Patient is currently hemodynamically stable however still requires source control. Primary team notified of transfer initiation.     Scottie Quezada MD   [facility] Internal Medicine HO2

## 2024-08-27 NOTE — DISCHARGE SUMMARY
[facility] Medicine Discharge Summary    Admitting Physician: Liam Strong MD  Attending Physician: Liam Strong MD  Date of Admit: 8/23/2024  Date of Discharge: 8/27/2024    Condition: Stable  Outcome:  Patient being transferred for further necessary care  DISPOSITION:  Other hospital        Discharge Diagnoses:     Patient Active Problem List   Diagnosis    Acute exacerbation of CHF (congestive heart failure)    Staphylococcus aureus bacteremia       Principal Problem:  Acute exacerbation of CHF (congestive heart failure)    Consultants and Procedures:     Consultants:  IP CONSULT TO HOSPITAL MEDICINE  IP CONSULT TO SOCIAL WORK/CASE MANAGEMENT  IP CONSULT TO INFECTIOUS DISEASES  IP CONSULT TO CARDIOLOGY    Procedures:   * No surgery found *      Brief Admission History:      Eliseo Denson is a 54 y.o.  male with PMH of hypertension, hyperlipidemia, CAD status post stent placement (unknown vessels), NSTEMI, ischemic cardiomyopathy, heart failure reduced ejection fraction (EF 30%), who presented to [facility] ED (8/23/2024) due to shortness of breath. Patient states that he was previously hospitalized approximately 1 month ago. He presented to [facility] ED and was found to have a myocardial infarction at that time. He suffered a run of V-tach while in ED and was subsequently defibrillated then transferred to our [facility] where he underwent left heart catheterization. Left heart catheterization negative for obstructive heart disease of native valves and/or stents. Patient was subsequently discharged with medical management with planned follow up. Patient endorses 100% compliance with home regimen. He states that beginning approximately 3 days ago he began to experience worsening shortness of breath that initially began with ambulation only. Patient was previously able to ambulate without limitations prior to 3 days ago. He started breath approximate 20 ft which progressively  "worsened to shortness of breath at rest with worsening orthopnea. He normally rest with 2 pillows underneath him at night and had add another 2 pillows beneath him in order to sleep. He also notes experiencing been bendopnea over the last 3 days as well. Yesterday evening patient experienced new onset left apical sharp, stabbing 6/10 chest pain and worsening shortness of breath. Symptoms persisted for multiple hours as patient was unable to sleep prompting wife to bring patient to ED for evaluation at that time.     Hospital Course with Pertinent Findings:      Patient presented with shortness of breath 1 month after having ICD placed. Found to have worsened EF from last, now 28%. Likely HFrEF exacerbation. Started on GDMT and improved in dyspnea. On this admission, patient found to have blood cultures + for MSSA. Started on vancomycin and de-escalated to oxacillin. Patient has remained HDS and has improved clinically throughout stay. Consulted ID and cardiology, recommended removal of ICD due to MSSA bacteremia. Currently has minor drainage from ICD site, now 1 month after placement. Blood cultures have remained positive on day of discharge. Will be transferred to [facility] for removal of ICD, TATY, and treatment of MSSA bacteremia. Will need further treatment at outside hospital for MSSA bacteremia, continue oxacillin at [facility]. No complaints on day of discharge, HDS.    Discharge physical exam:  Vitals  BP: 121/80  Temp: 98.3 °F (36.8 °C)  Temp Source: Oral  Pulse: 72  Resp: 18  SpO2: (!) 92 %  Height: 5' 11" (180.3 cm)  Weight: 107 kg (236 lb)    Constitutional:       Appearance: Normal appearance.   HENT:      Head: Normocephalic and atraumatic.      Nose: Nose normal.      Mouth/Throat:      Mouth: Mucous membranes are moist.      Pharynx: Oropharynx is clear.   Eyes:      Conjunctiva/sclera: Conjunctivae normal.      Pupils: Pupils are equal, round, and reactive to light.   Cardiovascular:      Rate and Rhythm: " Normal rate and regular rhythm.      Pulses: Normal pulses.      Heart sounds: No murmur heard.  Pulmonary:      Effort: Pulmonary effort is normal. No respiratory distress.      Breath sounds: No wheezing.   Chest:      Chest wall: No tenderness.   Abdominal:      General: Abdomen is flat. Bowel sounds are normal. There is no distension.      Palpations: Abdomen is soft.      Tenderness: There is no abdominal tenderness.   Musculoskeletal:         General: No swelling. Normal range of motion.      Cervical back: Normal range of motion.   Skin:     General: Skin is warm.      Comments: ICD site at L upper clavical region with mild serous drainage around site, non-tender currently.   Neurological:      General: No focal deficit present.      Mental Status: He is alert and oriented to person, place, and time.     TIME SPENT ON DISCHARGE: 35 minutes    Discharge Medications:         Medication List        START taking these medications      sacubitriL-valsartan 24-26 mg per tablet  Commonly known as: ENTRESTO  Take 1 tablet by mouth 2 (two) times daily.            CONTINUE taking these medications      amiodarone 200 MG Tab  Commonly known as: PACERONE  Take 200 mg by mouth every morning.     amLODIPine 10 MG tablet  Commonly known as: NORVASC  Take 10 mg by mouth once daily.     JAUN CHEWABLE ASPIRIN 81 mg Chew  Generic drug: aspirin  Take 81 mg by mouth.     rosuvastatin 10 MG tablet  Commonly known as: CRESTOR  Take 10 mg by mouth once daily.            STOP taking these medications      clopidogreL 75 mg tablet  Commonly known as: PLAVIX     losartan-hydrochlorothiazide 100-25 mg 100-25 mg per tablet  Commonly known as: HYZAAR                Discharge Instructions:         Eliseo Denson is being discharged .    No discharge procedures on file.     Follow-Up Appointments:  N/A      To address at follow-up:  -The following labs are to be drawn at the [facility] visit: CBC/CMP  -The following imaging studies  are to be ordered at the [facility] visit: echocardiogram    At this time, Eliseo Denson is determined to have maximized benefits of IP hospitalization. he is discharged in stable condition with OP f/u recommendations and instructions. All questions answered, and patient verbalized agreement with the POC. They were given return precautions prior to d/c including symptoms that should prompt return to ED or to call PCP. Total time spent of DC of 35 minutes.     Jayro English MD  [facility] Internal Medicine  HO-1

## 2024-08-27 NOTE — PROGRESS NOTES
[facility]   Inpatient Infectious Diseases Consultation    Physician requesting consultation: Liam Strong MD  Service requesting consultation: Internal Medicine  Reason for consultation: MSSA bacteremia    Historian: Patient and Electronic Medical Record    Isolation Status: No active isolations     HPI:     54 year old AA male with history HTN, HLD, CAD s/p PCI and stenting to LAD and D3 and  of mid Cx and OM3 with right to left collaterals, ICMO, HFrEF (EF 30%), s/p Dual Chamber ICD 7/26/2024 presented to [facility] with SOB. Was admitted 1 month prior with ICD placement in the setting of VT (VF) s/p Defibrillation at [facility]. Reportedly having SOB 3 day prior to ED evaluation. Was hypertensive in the ED with elevated BNP, volume overload with bilateral interstitial opacities on CXR. Noted to have febrile episode as high as 39.3, last yesterday morning 8/25/2024. He was started on oxacillin.  Patient was doing fine reportedly after discharge from [facility] but has had pocket tenderness at battery site worsening over the past month.  No fevers, chills.  He does endorse remote history of cocaine and opioid use but not within the past 30 years.  No IV drug use, no meth use.    Interval Hx: No acute changes overnight. Afebrile. No leukocytosis. Continuing Oxacillin. No complaints at this time.      Antibiotic / Antiviral / Antifungal history:    Antibiotics (From admission, onward)      Start     Stop Route Frequency Ordered    08/25/24 1000  oxacillin 2 g in 0.9% NaCl 100 mL IVPB         -- IV Every 4 hours (non-standard times) 08/25/24 0854              Past Medical History/Past Surgical History/Social History     History reviewed. No pertinent past medical history.    History reviewed. No pertinent surgical history.     FAMILY HISTORY:  family history is not on file.     SOCIAL HISTORY:   Social History     Socioeconomic History    Marital status:         ALLERGIES: Review of  patient's allergies indicates:  No Known Allergies      Review of Systems     Review of Systems   Constitutional:  Positive for fever.   Respiratory:  Positive for shortness of breath.    Musculoskeletal:  Positive for neck pain.         MEDICATIONS:   Scheduled Meds:   amiodarone  200 mg Oral QAM    atorvastatin  80 mg Oral Daily    carvediloL  6.25 mg Oral BID    heparin (porcine)  5,000 Units Subcutaneous Q12H    oxacillin IV (PEDS and ADULTS)  2 g Intravenous Q4H    sacubitriL-valsartan  1 tablet Oral BID     Continuous Infusions:  PRN Meds:.  Current Facility-Administered Medications:     acetaminophen, 1,000 mg, Oral, Q6H PRN    cetirizine, 10 mg, Oral, Daily PRN    dextromethorphan-guaiFENesin  mg/5 ml, 10 mL, Oral, Q6H PRN    dextrose 10%, 12.5 g, Intravenous, PRN    dextrose 10%, 25 g, Intravenous, PRN    glucagon (human recombinant), 1 mg, Intramuscular, PRN    glucose, 16 g, Oral, PRN    glucose, 24 g, Oral, PRN    labetalol, 10 mg, Intravenous, Q4H PRN    melatonin, 6 mg, Oral, Nightly PRN    naloxone, 0.02 mg, Intravenous, PRN    nicotine, 1 patch, Transdermal, Daily PRN    nitroGLYCERIN, 0.4 mg, Sublingual, Q5 Min PRN    ondansetron, 8 mg, Oral, Q8H PRN    polyethylene glycol, 17 g, Oral, Daily PRN    prochlorperazine, 5 mg, Intravenous, Q6H PRN    senna-docusate 8.6-50 mg, 1 tablet, Oral, BID PRN    simethicone, 1 tablet, Oral, TID PRN    sodium chloride 0.9%, 10 mL, Intravenous, PRN    Physical exam:     Temp:  [97.9 °F (36.6 °C)-98.5 °F (36.9 °C)] 98.5 °F (36.9 °C)  Pulse:  [63-66] 66  Resp:  [18-19] 19  SpO2:  [94 %-98 %] 98 %  BP: (115-149)/(74-99) 137/95     GENERAL: A&Ox3, NAD  HEENT: atraumatic, normocephalic, anicteric, moist oral mucosa without lesions, exudate, or erythema  LUNGS: breathing unlabored, lungs CTA bilateral  HEART: RRR; no murmur, rub, or gallop  ABDOMEN: abdomen soft, nondistended, BS noted x 4 quadrants, no tympany, no rebound, guarding, or organomegaly  : no CVA  tenderness  EXTREMITIES: no edema, clubbing, or cyanosis   SKIN:  ICD pocket demonstrates swelling, warmth though no drainage  NEURO: speech fluent and intact, facial symmetry preserved, no tremor  PSYCH: cooperative, normal mood and affect        LABS:     I have personally reviewed patient's labs.  Pertinent results noted below.    CBC  Recent Labs     08/25/24  0345 08/26/24  0620 08/27/24  0535   WBC 7.12 4.69 5.13   HGB 16.3 17.7 16.1   HCT 47.0 53.0* 46.7    301 307       Differential  Recent Labs   Lab 08/27/24  0535   WBC 5.13   HGB 16.1   HCT 46.7           Basic Metabolic Panel  Recent Labs     08/25/24  0512 08/26/24  0624 08/27/24  0535   * 135* 136   K 3.9 3.6 3.5   CL 91* 96* 100   CO2 29 29 27   BUN 17.8 21.2 15.9   CREATININE 1.29* 1.25* 1.21*        Hepatic Panel  Lab Results   Component Value Date    ALT 28 08/27/2024    AST 23 08/27/2024    ALKPHOS 100 08/27/2024    BILITOT 0.3 08/27/2024        Urinalysis:  Results for orders placed or performed during the hospital encounter of 07/24/24   Urinalysis, Reflex to Urine Culture    Specimen: Urine   Result Value Ref Range    Color, UA Light-Yellow Yellow, Light-Yellow, Dark Yellow, Ema, Straw    Appearance, UA Clear Clear    Specific Gravity, UA 1.019 1.005 - 1.030    pH, UA 5.5 5.0 - 8.5    Protein, UA 2+ (A) Negative    Glucose, UA Normal Negative, Normal    Ketones, UA Negative Negative    Blood, UA Trace (A) Negative    Bilirubin, UA Negative Negative    Urobilinogen, UA Normal 0.2, 1.0, Normal    Nitrites, UA Negative Negative    Leukocyte Esterase, UA Negative Negative    RBC, UA 0-5 None Seen, 0-2, 3-5, 0-5 /HPF    WBC, UA 0-5 None Seen, 0-2, 3-5, 0-5 /HPF    Bacteria, UA None Seen None Seen /HPF    Squamous Epithelial Cells, UA None Seen None Seen /HPF    Mucous, UA Trace (A) None Seen /LPF    Hyaline Casts, UA 0-2 (A) None Seen /lpf       Estimated Creatinine Clearance: 86.9 mL/min (A) (based on SCr of 1.21 mg/dL (H)).      ESR:  No results found for this or any previous visit.   CRP:  No results found for this or any previous visit.        MICRO AND PATHOLOGY:   Colonization:  Results for orders placed or performed during the hospital encounter of 08/23/24   MRSA PCR   Result Value Ref Range    MRSA PCR Screen Not Detected Not Detected    Narrative    The Xpert MRSA Assay utilizes automated real-time polymerase chain reaction (PCR) to detect MRSA DNA.  A positive test result does not necessarily indicate the presence of viable organism.  It is however, presumptive for the presence of MRSA.           IMAGING:     I have personally reviewed patient's imaging. Pertinent results noted below.  CTA Chest Non-Coronary (PE Studies)   Final Result      1. Negative for pulmonary thromboembolic disease.   2. Patchy bilateral ground-glass opacities suspicious for infection though I suspect an element of pulmonary edema as well given septal thickening.   3. Trace right pleural effusion.   4. No significant discrepancy with the preliminary report.         Electronically signed by: Carlos Georges   Date:    08/23/2024   Time:    07:09      X-Ray Chest AP Portable   ED Interpretation   Cardiomegaly with moderate pulmonary vascular congestion;      Final Result      Bilateral interstitial predominant opacities, infection versus edema.         Electronically signed by: Carlos Georges   Date:    08/23/2024   Time:    06:11            IMPRESSION     1) MSSA bacteremia  2) ICD infection  3) Sepsis 2/2 above  4) Hx of VF s/p Defibrillation s/p ICD Placement 7/26  5) ICMO  6) HFrEF 30%  7) CAD with prior stenting to LAD and D3  8) HTN  9) HLD    Patient recently had dual-chamber ICD placed on 07/26/2024 at [facility] have sustaining an MI which resulted in ICMO and HFrEF.  Initially he reported some pain to the generator insertion site that eventually resolved, however about 1 week ago he noted redness/swelling/pain to the area which has been constant.  He also  noted some discharge from a wound that developed over the generator pocket.  Eventually he became short of breath presented to the ED for evaluation was noted to have MSSA bacteremia in 2/2 sets.  Repeated blood cultures on 08/25 have also been positive for MSSA in 2/2 sets as well indicating a high burden of bacteremia.    On exam there are no other wounds aside from a small wound to his left upper chest wall over his generator.  He denies any joint pains or back pain.    Suspect the etiology of his bacteremia is likely from an infected ICD.  TTE was completed which showed no evidence of IE however did note poor view the tricuspid valve. Recommend patient undergo TATY as well as ICD removal. Cardiology consult has been placed.    RECOMMENDATIONS:    - Repeat blood cultures X 2 every 48 hours until clearance   - Recommend TATY to to establish CIED pocket infection versus CIED lead/native valve IE  - Continue Oxacillin 2g IV q4h  - Please avoid central line insertions until blood cx are NG >72h  - Agree with transfer for TATY and removal of ICD     Thank you for the consult.     Odell Denis MD  [facility] INTERNAL MEDICINE PGY-3  08/27/2024 8:05 AM  [facility]

## 2024-08-27 NOTE — PLAN OF CARE
Problem: Adult Inpatient Plan of Care  Goal: Plan of Care Review  Outcome: Adequate for Care Transition  Goal: Patient-Specific Goal (Individualized)  Outcome: Adequate for Care Transition  Goal: Absence of Hospital-Acquired Illness or Injury  Outcome: Adequate for Care Transition  Goal: Optimal Comfort and Wellbeing  Outcome: Adequate for Care Transition  Goal: Readiness for Transition of Care  Outcome: Adequate for Care Transition     Problem: Heart Failure  Goal: Optimal Coping  Outcome: Adequate for Care Transition  Goal: Optimal Cardiac Output  Outcome: Adequate for Care Transition  Goal: Stable Heart Rate and Rhythm  Outcome: Adequate for Care Transition  Goal: Optimal Functional Ability  Outcome: Adequate for Care Transition  Goal: Fluid and Electrolyte Balance  Outcome: Adequate for Care Transition  Goal: Improved Oral Intake  Outcome: Adequate for Care Transition  Goal: Effective Oxygenation and Ventilation  Outcome: Adequate for Care Transition  Goal: Effective Breathing Pattern During Sleep  Outcome: Adequate for Care Transition

## 2024-08-27 NOTE — PROGRESS NOTES
[facility] Medicine Wards   Progress Note     Resident Team: [facility] Medicine List 3  Attending Physician: Liam Strong MD  Resident: Marifer  Intern: Merlin     Subjective:      Brief HPI:  Eliseo Denson is a 54 y.o.  male with PMH of hypertension, hyperlipidemia, CAD status post stent placement (unknown vessels), NSTEMI, ischemic cardiomyopathy, heart failure reduced ejection fraction (EF 30%), who presented to [facility] ED (8/23/2024) due to shortness of breath.  Patient states that he was previously hospitalized approximately 1 month ago.  He presented to [facility] ED and was found to have a myocardial infarction at that time.  He suffered a run of V-tach while in ED and was subsequently defibrillated then transferred to our [facility] where he underwent left heart catheterization.  Left heart catheterization negative for obstructive heart disease of native valves and/or stents.  Patient was subsequently discharged with medical management with planned follow up.  Patient endorses 100% compliance with home regimen.  He states that beginning approximately 3 days ago he began to experience worsening shortness of breath that initially began with ambulation only.  Patient was previously able to ambulate without limitations prior to 3 days ago.  He started breath approximate 20 ft which progressively worsened to shortness of breath at rest with worsening orthopnea.  He normally rest with 2 pillows underneath him at night and had add another 2 pillows beneath him in order to sleep.  He also notes experiencing been bendopnea over the last 3 days as well.  Yesterday evening patient experienced new onset left apical sharp, stabbing 6/10 chest pain and worsening shortness of breath.  Symptoms persisted for multiple hours as patient was unable to sleep prompting wife to bring patient to ED for evaluation at that time.     Interval History:   Patient with no acute events overnight.  He remains afebrile  and vital signs stable.  Patient does complain of some mild chest pain that happened sporadically this morning.  Patient reports no pain with palpation and no pain during evaluation.  Transfer request has been placed and currently awaiting open bed at [facility].  Patient remains on IV oxacillin for treatment of MSSA bacteremia.        Review of Systems   Constitutional:  Negative for chills and fever.   HENT:  Negative for congestion, sinus pain and sore throat.    Eyes:  Negative for blurred vision and double vision.   Respiratory:  Negative for cough, sputum production, shortness of breath and wheezing.    Cardiovascular:  Negative for chest pain, palpitations and leg swelling.   Gastrointestinal:  Negative for abdominal pain, nausea and vomiting.   Genitourinary:  Negative for dysuria.   Musculoskeletal:  Negative for myalgias.   Neurological:  Negative for dizziness, focal weakness, seizures and weakness.           Objective:     Vital Signs (Most Recent):  Temp: 98.3 °F (36.8 °C) (08/27/24 1141)  Pulse: 72 (08/27/24 1141)  Resp: 18 (08/27/24 1141)  BP: 121/80 (08/27/24 1141)  SpO2: (!) 92 % (08/27/24 1141) Vital Signs (24h Range):  Temp:  [97.9 °F (36.6 °C)-98.5 °F (36.9 °C)] 98.3 °F (36.8 °C)  Pulse:  [61-72] 72  Resp:  [18-19] 18  SpO2:  [92 %-98 %] 92 %  BP: (119-149)/(77-99) 121/80     Physical Exam  Constitutional:       Appearance: Normal appearance.   HENT:      Head: Normocephalic and atraumatic.      Nose: Nose normal.      Mouth/Throat:      Mouth: Mucous membranes are moist.      Pharynx: Oropharynx is clear.   Eyes:      Conjunctiva/sclera: Conjunctivae normal.      Pupils: Pupils are equal, round, and reactive to light.   Cardiovascular:      Rate and Rhythm: Normal rate and regular rhythm.      Pulses: Normal pulses.      Heart sounds: No murmur heard.  Pulmonary:      Effort: Pulmonary effort is normal. No respiratory distress.      Breath sounds: No wheezing.   Chest:      Chest wall: No tenderness.    Abdominal:      General: Abdomen is flat. Bowel sounds are normal. There is no distension.      Palpations: Abdomen is soft.      Tenderness: There is no abdominal tenderness.   Musculoskeletal:         General: No swelling. Normal range of motion.      Cervical back: Normal range of motion.   Skin:     General: Skin is warm.      Comments: ICD site at L upper clavical region with mild serous drainage around site, non-tender currently.   Neurological:      General: No focal deficit present.      Mental Status: He is alert and oriented to person, place, and time.          Laboratory:  Most Recent Data:  CBC:   Recent Labs   Lab 08/26/24  0620 08/27/24  0535   WBC 4.69 5.13   HGB 17.7 16.1   HCT 53.0* 46.7    307     CMP:   Recent Labs   Lab 08/27/24  0535   CALCIUM 9.1   ALBUMIN 2.7*      K 3.5   CO2 27      BUN 15.9   CREATININE 1.21*   ALKPHOS 100   ALT 28   AST 23   BILITOT 0.3         Microbiology Data Reviewed: yes  Pertinent Findings:  Blood culture w/ Staph aureus, MecA negative  -repeat blood cultures same, 2/2    Other Results:  EKG (my interpretation): normal EKG, normal sinus rhythm, unchanged from previous tracings.    Radiology:  Imaging Results              CTA Chest Non-Coronary (PE Studies) (Final result)  Result time 08/23/24 07:09:23      Final result by Carlos Georges MD (08/23/24 07:09:23)                   Impression:      1. Negative for pulmonary thromboembolic disease.  2. Patchy bilateral ground-glass opacities suspicious for infection though I suspect an element of pulmonary edema as well given septal thickening.  3. Trace right pleural effusion.  4. No significant discrepancy with the preliminary report.      Electronically signed by: Carlos Georges  Date:    08/23/2024  Time:    07:09               Narrative:    EXAMINATION:  CTA CHEST NON CORONARY (PE STUDIES)    CLINICAL HISTORY:  Pulmonary embolism (PE) suspected, positive D-dimer;    TECHNIQUE:  Helical acquisition  through the chest with IV contrast targeting the pulmonary arteries. Multiplanar and 3D MIP reconstructed images were provided for review.  mGycm. Automatic exposure control, adjustment of mA/kV or iterative reconstruction technique was used to reduce radiation.    COMPARISON:  None available.    FINDINGS:  There is good opacification of the pulmonary arterial tree. There is no evidence of pulmonary thromboembolism.  There is no aortic dissection.    There is no mediastinal, hilar or axillary lymphadenopathy by size criteria.    Heart is not significantly enlarged.  There is no pericardial effusion.  There are coronary artery calcifications and probably stents.    There is trace right pleural effusion.  There are patchy ground-glass predominant opacities in both lungs, right greater than left.  No dense consolidation.  There is some septal thickening.    There are no acute findings in the imaged upper abdomen.  There is a small hiatal hernia.  There are no acute osseous findings.                        Preliminary result by Carmelo Jacobo Jr., MD (08/23/24 05:54:33)                   Impression:    1. No filling defects are seen in the pulmonary arteries to suggest pulmonary embolus.  2. There are scattered reticular, nodular and patchy grounglass opacities in both lungs, more pronounced in the right. This is consistent with multifocal possibly atypical pneumonia. Correlate clinically as regards further evaluation and follow up.  3. No CT evidence of pulmonary embolism. Details and other findings as discussed above.               Narrative:    START OF REPORT:  Technique: CT Scan of the chest was performed with intravenous contrast with direct axial images as well as sagittal and coronal reconstruction images pulmonary embolus protocol.    Dosage Information: Automated Exposure Control was utilized.    Comparison: Correlation is with study [id number] 05:18:01.    Clinical History: Pulmonary  embolism (PE) suspected, positive D-dimer.    Findings:  Soft Tissues: Unremarkable.  Lines and Tubes: None.  Neck: The visualized soft tissues of the neck appear unremarkable.  Mediastinum: The mediastinal structures are within normal limits.  Heart: The heart appears unremarkable. Cardiac pacer leads are seen in the right atrium and right ventricle.  Aorta: No aortic dissection or aneurysm is seen.  Pulmonary Arteries: Unremarkable. No filling defects are seen in the pulmonary arteries to suggest pulmonary embolus.  Lungs: Mild streaky linear opacity is seen predominantly in the dependent portions at the lung bases consistent with dependent changes scarring and subsegmental atelectasis. There are scattered reticular, nodular and patchy grounglass opacities in both lungs, more pronounced in the right. This is consistent with multifocal possibly atypical pneumonia.  Pleura: No effusions or pneumothorax are identified.  Bony Structures:  Spine: Subtle spondylolytic changes are seen in the thoracic spine.  Ribs: The bilateral ribs appear unremarkable.  Abdomen: A 2.5 cm cyst is noted in the left kidney.                                         X-Ray Chest AP Portable (Final result)  Result time 08/23/24 06:11:39      Final result by Carlos Georges MD (08/23/24 06:11:39)                   Impression:      Bilateral interstitial predominant opacities, infection versus edema.      Electronically signed by: Carlos Georges  Date:    08/23/2024  Time:    06:11               Narrative:    EXAMINATION:  XR CHEST AP PORTABLE    CLINICAL HISTORY:  Dyspnea, unspecified    COMPARISON:  24 July 2024    FINDINGS:  Frontal view of the chest was obtained. There is left-sided AICD.  The heart is enlarged.  There are bilateral interstitial predominant opacities.  No pneumothorax.                        Wet Read by Fito Michaels MD (08/23/24 05:22:01, [facility], Emergency Medicine)    Cardiomegaly with  moderate pulmonary vascular congestion;                                    Current Medications:     Infusions:       Scheduled:   amiodarone  200 mg Oral QAM    atorvastatin  80 mg Oral Daily    carvediloL  6.25 mg Oral BID    heparin (porcine)  5,000 Units Subcutaneous Q12H    oxacillin IV (PEDS and ADULTS)  2 g Intravenous Q4H    sacubitriL-valsartan  1 tablet Oral BID        PRN:    Current Facility-Administered Medications:     acetaminophen, 1,000 mg, Oral, Q6H PRN    cetirizine, 10 mg, Oral, Daily PRN    dextromethorphan-guaiFENesin  mg/5 ml, 10 mL, Oral, Q6H PRN    dextrose 10%, 12.5 g, Intravenous, PRN    dextrose 10%, 25 g, Intravenous, PRN    glucagon (human recombinant), 1 mg, Intramuscular, PRN    glucose, 16 g, Oral, PRN    glucose, 24 g, Oral, PRN    labetalol, 10 mg, Intravenous, Q4H PRN    melatonin, 6 mg, Oral, Nightly PRN    naloxone, 0.02 mg, Intravenous, PRN    nicotine, 1 patch, Transdermal, Daily PRN    nitroGLYCERIN, 0.4 mg, Sublingual, Q5 Min PRN    ondansetron, 8 mg, Oral, Q8H PRN    polyethylene glycol, 17 g, Oral, Daily PRN    prochlorperazine, 5 mg, Intravenous, Q6H PRN    senna-docusate 8.6-50 mg, 1 tablet, Oral, BID PRN    simethicone, 1 tablet, Oral, TID PRN    sodium chloride 0.9%, 10 mL, Intravenous, PRN    Antibiotics and Day Number of Therapy:  Stopped vancomycin, on oxacillin day 2      Intake/Output Summary (Last 24 hours) at 8/27/2024 1243  Last data filed at 8/27/2024 0755  Gross per 24 hour   Intake 400 ml   Output --   Net 400 ml       Lines/Drains/Airways       Peripheral Intravenous Line  Duration                  Peripheral IV - Single Lumen 08/25/24 2250 20 G Posterior;Right Forearm 1 day                    Assessment & Plan:     Staph aureus bacteremia  Suspect pneumonia  Concern infected hardware, ICD  -SIRS initially 1/4 (RR 20)  -Lactic acid normal in the ED  -Blood cultures on (8/23/24) positive for gram positive cocci; BCID2 panel with Staph aureus  -CTA chest  showed bilateral ground glass opacity suspicious for infection  -Will hold off on further diuresis with lasix  -TTE (8/23/24) negative for vegetations  -BCID negative for MecA, repeated blood cultures yesterday due to 48hrs after +; same findings  -on oxacillin day 3  -repeat blood cultures ordered at this time  -needs to have ICD removed, will need transfer to facility with EP Services for ICD lead removal currently awaiting open bed    CAD s/p stent placement 2008  Vfib s/p ICD placement  -Reported history of stent  -C on 7/25/24 with chronically occluded OM  -Echo (8/23/24) showed EF 28%  -Will hold off on DAPT due to stent years ago  -Continue Amiodarone 200 mg daily  -Continue GDMT with Entresto low dose and Coreg  -Will attempt to start Farxiga outpatient per last Cardiology note    Hypertension  -Currently on Entresto and Coreg  -Continue to monitor    Hyperlipidemia  -Continue on Atorvastatin 80 mg daily    CODE STATUS: Full Code  Access: Peripheral  Antibiotics: Vancomycin  Diet: Cardiac  DVT Prophylaxis: Heparin  GI Prophylaxis: none needed    Disposition: day 3 of admission for  staph bacteremia.  Currently awaiting transferred to [facility] for higher level of care requiring EP services for ICD lead removal.    Sudhakar Caicedo MD  [facility], PGY-3

## 2024-08-27 NOTE — DISCHARGE INSTRUCTIONS
Pt is being transferred to [facility] for further evaluation and treatments due to suspected infected lcd. And possible removal of icd. Pt understands transfer instructions. Pt have no questions

## 2024-08-27 NOTE — NURSING
Nurses Note -- 4 Eyes      8/27/2024   6:16 PM      Skin assessed during: Q Shift Change      [] No Altered Skin Integrity Present    []Prevention Measures Documented      [] Yes- Altered Skin Integrity Present or Discovered   [] LDA Added if Not in Epic (Describe Wound)   [] New Altered Skin Integrity was Present on Admit and Documented in LDA   [] Wound Image Taken    Wound Care Consulted? No    Attending Nurse:  Samantha Jimenez RN/Staff Member:   [place]

## 2024-08-27 NOTE — PLAN OF CARE
Problem: Adult Inpatient Plan of Care  Goal: Plan of Care Review  8/27/2024 1623 by Nesha Fatima LPN  Outcome: Adequate for Care Transition  8/27/2024 1456 by Nesha Fatima LPN  Outcome: Adequate for Care Transition  Goal: Patient-Specific Goal (Individualized)  8/27/2024 1623 by Nesha Fatima LPN  Outcome: Adequate for Care Transition  8/27/2024 1456 by Nesha Fatima LPN  Outcome: Adequate for Care Transition  Goal: Absence of Hospital-Acquired Illness or Injury  8/27/2024 1623 by Nesha Fatima LPN  Outcome: Adequate for Care Transition  8/27/2024 1456 by Nesha Fatima LPN  Outcome: Adequate for Care Transition  Goal: Optimal Comfort and Wellbeing  8/27/2024 1623 by eNsha Fatima LPN  Outcome: Adequate for Care Transition  8/27/2024 1456 by Nesha Fatima LPN  Outcome: Adequate for Care Transition  Goal: Readiness for Transition of Care  8/27/2024 1623 by Nesha Fatima LPN  Outcome: Adequate for Care Transition  8/27/2024 1456 by Nesha Fatima LPN  Outcome: Adequate for Care Transition     Problem: Heart Failure  Goal: Optimal Coping  8/27/2024 1623 by Nesha Fatima LPN  Outcome: Adequate for Care Transition  8/27/2024 1456 by Nesha Fatima LPN  Outcome: Adequate for Care Transition  Goal: Optimal Cardiac Output  8/27/2024 1623 by Nesha Fatima LPN  Outcome: Adequate for Care Transition  8/27/2024 1456 by Nesha Fatima LPN  Outcome: Adequate for Care Transition  Goal: Stable Heart Rate and Rhythm  8/27/2024 1623 by Nesha Fatima LPN  Outcome: Adequate for Care Transition  8/27/2024 1456 by Nesha Fatima LPN  Outcome: Adequate for Care Transition  Goal: Optimal Functional Ability  8/27/2024 1623 by Nesha Fatima LPN  Outcome: Adequate for Care Transition  8/27/2024 1456 by Nesha Fatima LPN  Outcome: Adequate for Care Transition  Goal: Fluid and Electrolyte Balance  8/27/2024 1623 by Nesha Fatima LPN  Outcome: Adequate  for Care Transition  8/27/2024 1456 by Nesha Fatima LPN  Outcome: Adequate for Care Transition  Goal: Improved Oral Intake  8/27/2024 1623 by Nesha Fatima LPN  Outcome: Adequate for Care Transition  8/27/2024 1456 by Nesha Fatima LPN  Outcome: Adequate for Care Transition  Goal: Effective Oxygenation and Ventilation  8/27/2024 1623 by Nesha Fatima LPN  Outcome: Adequate for Care Transition  8/27/2024 1456 by Nesha Fatima LPN  Outcome: Adequate for Care Transition  Goal: Effective Breathing Pattern During Sleep  8/27/2024 1623 by Nesha Fatima LPN  Outcome: Adequate for Care Transition  8/27/2024 1456 by Nesha Fatima LPN  Outcome: Adequate for Care Transition

## 2024-08-28 ENCOUNTER — ANESTHESIA EVENT (OUTPATIENT)
Dept: CARDIOLOGY | Facility: HOSPITAL | Age: 54
End: 2024-08-28

## 2024-08-28 ENCOUNTER — ANESTHESIA (OUTPATIENT)
Dept: CARDIOLOGY | Facility: HOSPITAL | Age: 54
End: 2024-08-28

## 2024-08-28 LAB
ALBUMIN SERPL-MCNC: 2.8 G/DL (ref 3.5–5)
ALBUMIN/GLOB SERPL: 0.8 RATIO (ref 1.1–2)
ALP SERPL-CCNC: 107 UNIT/L (ref 40–150)
ALT SERPL-CCNC: 29 UNIT/L (ref 0–55)
ANION GAP SERPL CALC-SCNC: 8 MEQ/L
AST SERPL-CCNC: 26 UNIT/L (ref 5–34)
BASOPHILS # BLD AUTO: 0.03 X10(3)/MCL
BASOPHILS NFR BLD AUTO: 0.5 %
BILIRUB SERPL-MCNC: 0.2 MG/DL
BNP BLD-MCNC: 176 PG/ML
BUN SERPL-MCNC: 16 MG/DL (ref 8.4–25.7)
CALCIUM SERPL-MCNC: 8.8 MG/DL (ref 8.4–10.2)
CHLORIDE SERPL-SCNC: 104 MMOL/L (ref 98–107)
CO2 SERPL-SCNC: 27 MMOL/L (ref 22–29)
CREAT SERPL-MCNC: 1.23 MG/DL (ref 0.73–1.18)
CREAT/UREA NIT SERPL: 13
CRP SERPL-MCNC: 58.5 MG/L
EOSINOPHIL # BLD AUTO: 0.16 X10(3)/MCL (ref 0–0.9)
EOSINOPHIL NFR BLD AUTO: 2.8 %
ERYTHROCYTE [DISTWIDTH] IN BLOOD BY AUTOMATED COUNT: 13.1 % (ref 11.5–17)
ERYTHROCYTE [SEDIMENTATION RATE] IN BLOOD: 52 MM/HR (ref 0–15)
GFR SERPLBLD CREATININE-BSD FMLA CKD-EPI: >60 ML/MIN/1.73/M2
GLOBULIN SER-MCNC: 3.3 GM/DL (ref 2.4–3.5)
GLUCOSE SERPL-MCNC: 110 MG/DL (ref 74–100)
HCT VFR BLD AUTO: 44.6 % (ref 42–52)
HGB BLD-MCNC: 14.8 G/DL (ref 14–18)
IMM GRANULOCYTES # BLD AUTO: 0.01 X10(3)/MCL (ref 0–0.04)
IMM GRANULOCYTES NFR BLD AUTO: 0.2 %
LYMPHOCYTES # BLD AUTO: 1.31 X10(3)/MCL (ref 0.6–4.6)
LYMPHOCYTES NFR BLD AUTO: 22.9 %
MAGNESIUM SERPL-MCNC: 2.1 MG/DL (ref 1.6–2.6)
MCH RBC QN AUTO: 30.3 PG (ref 27–31)
MCHC RBC AUTO-ENTMCNC: 33.2 G/DL (ref 33–36)
MCV RBC AUTO: 91.2 FL (ref 80–94)
MONOCYTES # BLD AUTO: 0.88 X10(3)/MCL (ref 0.1–1.3)
MONOCYTES NFR BLD AUTO: 15.4 %
NEUTROPHILS # BLD AUTO: 3.33 X10(3)/MCL (ref 2.1–9.2)
NEUTROPHILS NFR BLD AUTO: 58.2 %
NRBC BLD AUTO-RTO: 0 %
PHOSPHATE SERPL-MCNC: 3.1 MG/DL (ref 2.3–4.7)
PLATELET # BLD AUTO: 330 X10(3)/MCL (ref 130–400)
PMV BLD AUTO: 9.6 FL (ref 7.4–10.4)
POTASSIUM SERPL-SCNC: 4.3 MMOL/L (ref 3.5–5.1)
PROT SERPL-MCNC: 6.1 GM/DL (ref 6.4–8.3)
RBC # BLD AUTO: 4.89 X10(6)/MCL (ref 4.7–6.1)
SODIUM SERPL-SCNC: 139 MMOL/L (ref 136–145)
WBC # BLD AUTO: 5.72 X10(3)/MCL (ref 4.5–11.5)

## 2024-08-28 PROCEDURE — 25000003 PHARM REV CODE 250: Performed by: INTERNAL MEDICINE

## 2024-08-28 PROCEDURE — 85652 RBC SED RATE AUTOMATED: CPT | Performed by: INTERNAL MEDICINE

## 2024-08-28 PROCEDURE — 85025 COMPLETE CBC W/AUTO DIFF WBC: CPT | Performed by: INTERNAL MEDICINE

## 2024-08-28 PROCEDURE — 36415 COLL VENOUS BLD VENIPUNCTURE: CPT | Performed by: INTERNAL MEDICINE

## 2024-08-28 PROCEDURE — 84100 ASSAY OF PHOSPHORUS: CPT | Performed by: INTERNAL MEDICINE

## 2024-08-28 PROCEDURE — 63600175 PHARM REV CODE 636 W HCPCS: Performed by: NURSE ANESTHETIST, CERTIFIED REGISTERED

## 2024-08-28 PROCEDURE — 25000003 PHARM REV CODE 250: Performed by: NURSE ANESTHETIST, CERTIFIED REGISTERED

## 2024-08-28 PROCEDURE — 63600175 PHARM REV CODE 636 W HCPCS: Performed by: INTERNAL MEDICINE

## 2024-08-28 PROCEDURE — 25000003 PHARM REV CODE 250: Performed by: NURSE PRACTITIONER

## 2024-08-28 PROCEDURE — 80053 COMPREHEN METABOLIC PANEL: CPT | Performed by: INTERNAL MEDICINE

## 2024-08-28 PROCEDURE — 83735 ASSAY OF MAGNESIUM: CPT | Performed by: INTERNAL MEDICINE

## 2024-08-28 PROCEDURE — 99223 1ST HOSP IP/OBS HIGH 75: CPT | Mod: ,,, | Performed by: GENERAL PRACTICE

## 2024-08-28 PROCEDURE — 86140 C-REACTIVE PROTEIN: CPT | Performed by: INTERNAL MEDICINE

## 2024-08-28 PROCEDURE — D9220A PRA ANESTHESIA: Mod: ,,, | Performed by: ANESTHESIOLOGY

## 2024-08-28 PROCEDURE — 63600175 PHARM REV CODE 636 W HCPCS: Performed by: NURSE PRACTITIONER

## 2024-08-28 PROCEDURE — 83880 ASSAY OF NATRIURETIC PEPTIDE: CPT | Performed by: INTERNAL MEDICINE

## 2024-08-28 PROCEDURE — 11000001 HC ACUTE MED/SURG PRIVATE ROOM

## 2024-08-28 RX ORDER — PHENYLEPHRINE HYDROCHLORIDE 10 MG/ML
INJECTION INTRAVENOUS
Status: DISCONTINUED | OUTPATIENT
Start: 2024-08-28 | End: 2024-08-28

## 2024-08-28 RX ORDER — PROPOFOL 10 MG/ML
VIAL (ML) INTRAVENOUS
Status: DISCONTINUED | OUTPATIENT
Start: 2024-08-28 | End: 2024-08-28

## 2024-08-28 RX ORDER — ETOMIDATE 2 MG/ML
INJECTION INTRAVENOUS
Status: DISCONTINUED | OUTPATIENT
Start: 2024-08-28 | End: 2024-08-28

## 2024-08-28 RX ORDER — LIDOCAINE HYDROCHLORIDE 20 MG/ML
INJECTION, SOLUTION EPIDURAL; INFILTRATION; INTRACAUDAL; PERINEURAL
Status: DISCONTINUED | OUTPATIENT
Start: 2024-08-28 | End: 2024-08-28

## 2024-08-28 RX ADMIN — OXACILLIN 2 G: 2 INJECTION, POWDER, FOR SOLUTION INTRAMUSCULAR; INTRAVENOUS at 08:08

## 2024-08-28 RX ADMIN — ATORVASTATIN CALCIUM 80 MG: 40 TABLET, FILM COATED ORAL at 08:08

## 2024-08-28 RX ADMIN — TRAMADOL HYDROCHLORIDE 50 MG: 50 TABLET, COATED ORAL at 01:08

## 2024-08-28 RX ADMIN — OXACILLIN 2 G: 2 INJECTION, POWDER, FOR SOLUTION INTRAMUSCULAR; INTRAVENOUS at 11:08

## 2024-08-28 RX ADMIN — TRAMADOL HYDROCHLORIDE 50 MG: 50 TABLET, COATED ORAL at 04:08

## 2024-08-28 RX ADMIN — TRAMADOL HYDROCHLORIDE 50 MG: 50 TABLET, COATED ORAL at 08:08

## 2024-08-28 RX ADMIN — ENOXAPARIN SODIUM 40 MG: 40 INJECTION SUBCUTANEOUS at 04:08

## 2024-08-28 RX ADMIN — SACUBITRIL AND VALSARTAN 1 TABLET: 24; 26 TABLET, FILM COATED ORAL at 08:08

## 2024-08-28 RX ADMIN — AMIODARONE HYDROCHLORIDE 200 MG: 200 TABLET ORAL at 08:08

## 2024-08-28 RX ADMIN — TRAMADOL HYDROCHLORIDE 50 MG: 50 TABLET, COATED ORAL at 11:08

## 2024-08-28 RX ADMIN — LIDOCAINE HYDROCHLORIDE 4 ML: 20 INJECTION, SOLUTION EPIDURAL; INFILTRATION; INTRACAUDAL; PERINEURAL at 01:08

## 2024-08-28 RX ADMIN — CARVEDILOL 6.25 MG: 3.12 TABLET, FILM COATED ORAL at 08:08

## 2024-08-28 RX ADMIN — SODIUM CHLORIDE, SODIUM ACETATE ANHYDROUS, SODIUM GLUCONATE, POTASSIUM CHLORIDE, AND MAGNESIUM CHLORIDE: 526; 222; 502; 37; 30 INJECTION, SOLUTION INTRAVENOUS at 01:08

## 2024-08-28 RX ADMIN — OXACILLIN 2 G: 2 INJECTION, POWDER, FOR SOLUTION INTRAMUSCULAR; INTRAVENOUS at 03:08

## 2024-08-28 RX ADMIN — OXACILLIN 2 G: 2 INJECTION, POWDER, FOR SOLUTION INTRAMUSCULAR; INTRAVENOUS at 04:08

## 2024-08-28 RX ADMIN — PROPOFOL 30 MG: 10 INJECTION, EMULSION INTRAVENOUS at 01:08

## 2024-08-28 RX ADMIN — ETOMIDATE 3 MG: 2 INJECTION INTRAVENOUS at 01:08

## 2024-08-28 RX ADMIN — PHENYLEPHRINE HYDROCHLORIDE 100 MCG: 10 INJECTION INTRAVENOUS at 01:08

## 2024-08-28 NOTE — SUBJECTIVE & OBJECTIVE
Interval History:     Review of Systems   Constitutional: Negative.    HENT: Negative.     Eyes: Negative.    Respiratory: Negative.     Cardiovascular: Negative.    Gastrointestinal: Negative.    Endocrine: Negative.    Genitourinary: Negative.    Musculoskeletal: Negative.    Skin: Negative.    Allergic/Immunologic: Negative.    Neurological: Negative.    Hematological: Negative.    Psychiatric/Behavioral: Negative.       Objective:     Vital Signs (Most Recent):  Temp: 98.3 °F (36.8 °C) (08/28/24 1228)  Pulse: (!) 7 (08/28/24 1348)  Resp: 16 (08/28/24 1348)  BP: (!) 125/94 (08/28/24 1348)  SpO2: 97 % (08/28/24 1348) Vital Signs (24h Range):  Temp:  [97.9 °F (36.6 °C)-98.8 °F (37.1 °C)] 98.3 °F (36.8 °C)  Pulse:  [7-79] 7  Resp:  [16-18] 16  SpO2:  [93 %-98 %] 97 %  BP: (125-153)/(78-99) 125/94     Weight: 105 kg (231 lb 8 oz)  Body mass index is 32.29 kg/m².    Intake/Output Summary (Last 24 hours) at 8/28/2024 1507  Last data filed at 8/28/2024 1349  Gross per 24 hour   Intake 580 ml   Output 0 ml   Net 580 ml         Physical Exam  Constitutional:       Appearance: Normal appearance. He is normal weight.   HENT:      Head: Normocephalic and atraumatic.      Nose: Nose normal.      Mouth/Throat:      Mouth: Mucous membranes are moist.      Pharynx: Oropharynx is clear.   Eyes:      Extraocular Movements: Extraocular movements intact.      Pupils: Pupils are equal, round, and reactive to light.   Cardiovascular:      Rate and Rhythm: Normal rate and regular rhythm.      Pulses: Normal pulses.      Heart sounds: Normal heart sounds.   Pulmonary:      Effort: Pulmonary effort is normal.      Breath sounds: Normal breath sounds.   Abdominal:      General: Bowel sounds are normal.      Palpations: Abdomen is soft.   Musculoskeletal:         General: Normal range of motion.      Cervical back: Normal range of motion and neck supple.   Skin:     General: Skin is warm and dry.   Neurological:      General: No focal  deficit present.      Mental Status: He is alert and oriented to person, place, and time. Mental status is at baseline.   Psychiatric:         Mood and Affect: Mood normal.         Behavior: Behavior normal.         Thought Content: Thought content normal.         Judgment: Judgment normal.             Significant Labs: All pertinent labs within the past 24 hours have been reviewed.  BMP:   Recent Labs   Lab 08/28/24  0501      K 4.3      CO2 27   BUN 16.0   CREATININE 1.23*   CALCIUM 8.8   MG 2.10     CBC:   Recent Labs   Lab 08/27/24  0535 08/28/24  0501   WBC 5.13 5.72   HGB 16.1 14.8   HCT 46.7 44.6    330     CMP:   Recent Labs   Lab 08/27/24  0535 08/28/24  0501    139   K 3.5 4.3    104   CO2 27 27   BUN 15.9 16.0   CREATININE 1.21* 1.23*   CALCIUM 9.1 8.8   ALBUMIN 2.7* 2.8*   BILITOT 0.3 0.2   ALKPHOS 100 107   AST 23 26   ALT 28 29     Magnesium:   Recent Labs   Lab 08/27/24  0535 08/28/24  0501   MG 2.50 2.10       Significant Imaging: I have reviewed all pertinent imaging results/findings within the past 24 hours.

## 2024-08-28 NOTE — PROGRESS NOTES
[facility] - 6th Floor [facility] Medicine  Progress Note    Patient Name: Eliseo Denson  MRN: [id number]  Patient Class: IP- Inpatient   Admission Date: 8/27/2024  Length of Stay: 1 days  Attending Physician: Donna Solis MD  Primary Care Provider: Simin, Primary Doctor        Subjective:     Principal Problem:MSSA bacteremia        HPI:  54-year-old male with medical history of CAD/stent 2008, NSVT on amiodarone, ischemic cardiomyopathy, HTN, HLD, cardiologist Dr Arnulfo Bain who recently suffered NSTEMI and unstable VT on 7/24/2024 initially seen at [facility] ED and resuscitated/defibrillated and subsequently transferred to [facility], underwent LHC7/25/2024 by Dr. Hampton showing obstructive CAD that did not require intervention and underwent dual-chamber ICD (Biotronik) on 07/26/2024 by Dr Hussein and subsequently discharged home the next day.     Presented to [facility] ED on 08/23/2024 with complaint of shortness breath and sharp chest pain along the ICD site as well as worsening s dyspnea on exertion and orthopnea, night sweats and generalized fatigue.  His workup revealed MSSA bacteremia, transthoracic echo show LVEF 28% no evidence of vegetations on aortic or mitral valve, tricuspid and pulmonic valve not well visualized.  CTA chest show no evidence of PE, patchy bilateral ground-glass opacities suspicious for infection or pulmonary edema.  He was evaluated by Internal Medicine, Cardiology and Infectious Disease, transitioned to antibiotic with oxacillin per sensitivities.  Also received few days of IV diuresis and shortness breath has improved  Transferred to [facility] today 08/27/2024 for cardiology/TATY evaluation.     Upon arrival he was afebrile and hemodynamically stable saturating well on room air, in sinus rhythm, report intermittent sharp chest pain.    Overview/Hospital Course:  8/28/24-No new issues at this time.  Waiting for cultures.  Cardiology consulted for possible ICD  infection.    Interval History:     Review of Systems   Constitutional: Negative.    HENT: Negative.     Eyes: Negative.    Respiratory: Negative.     Cardiovascular: Negative.    Gastrointestinal: Negative.    Endocrine: Negative.    Genitourinary: Negative.    Musculoskeletal: Negative.    Skin: Negative.    Allergic/Immunologic: Negative.    Neurological: Negative.    Hematological: Negative.    Psychiatric/Behavioral: Negative.       Objective:     Vital Signs (Most Recent):  Temp: 98.3 °F (36.8 °C) (08/28/24 1228)  Pulse: (!) 7 (08/28/24 1348)  Resp: 16 (08/28/24 1348)  BP: (!) 125/94 (08/28/24 1348)  SpO2: 97 % (08/28/24 1348) Vital Signs (24h Range):  Temp:  [97.9 °F (36.6 °C)-98.8 °F (37.1 °C)] 98.3 °F (36.8 °C)  Pulse:  [7-79] 7  Resp:  [16-18] 16  SpO2:  [93 %-98 %] 97 %  BP: (125-153)/(78-99) 125/94     Weight: 105 kg (231 lb 8 oz)  Body mass index is 32.29 kg/m².    Intake/Output Summary (Last 24 hours) at 8/28/2024 1507  Last data filed at 8/28/2024 1349  Gross per 24 hour   Intake 580 ml   Output 0 ml   Net 580 ml         Physical Exam  Constitutional:       Appearance: Normal appearance. He is normal weight.   HENT:      Head: Normocephalic and atraumatic.      Nose: Nose normal.      Mouth/Throat:      Mouth: Mucous membranes are moist.      Pharynx: Oropharynx is clear.   Eyes:      Extraocular Movements: Extraocular movements intact.      Pupils: Pupils are equal, round, and reactive to light.   Cardiovascular:      Rate and Rhythm: Normal rate and regular rhythm.      Pulses: Normal pulses.      Heart sounds: Normal heart sounds.   Pulmonary:      Effort: Pulmonary effort is normal.      Breath sounds: Normal breath sounds.   Abdominal:      General: Bowel sounds are normal.      Palpations: Abdomen is soft.   Musculoskeletal:         General: Normal range of motion.      Cervical back: Normal range of motion and neck supple.   Skin:     General: Skin is warm and dry.   Neurological:      General:  No focal deficit present.      Mental Status: He is alert and oriented to person, place, and time. Mental status is at baseline.   Psychiatric:         Mood and Affect: Mood normal.         Behavior: Behavior normal.         Thought Content: Thought content normal.         Judgment: Judgment normal.             Significant Labs: All pertinent labs within the past 24 hours have been reviewed.  BMP:   Recent Labs   Lab 08/28/24  0501      K 4.3      CO2 27   BUN 16.0   CREATININE 1.23*   CALCIUM 8.8   MG 2.10     CBC:   Recent Labs   Lab 08/27/24  0535 08/28/24  0501   WBC 5.13 5.72   HGB 16.1 14.8   HCT 46.7 44.6    330     CMP:   Recent Labs   Lab 08/27/24  0535 08/28/24  0501    139   K 3.5 4.3    104   CO2 27 27   BUN 15.9 16.0   CREATININE 1.21* 1.23*   CALCIUM 9.1 8.8   ALBUMIN 2.7* 2.8*   BILITOT 0.3 0.2   ALKPHOS 100 107   AST 23 26   ALT 28 29     Magnesium:   Recent Labs   Lab 08/27/24  0535 08/28/24  0501   MG 2.50 2.10       Significant Imaging: I have reviewed all pertinent imaging results/findings within the past 24 hours.    Assessment/Plan:      * MSSA bacteremia  IV antibiotics  Cardiology consulted  Cultures pending  Follow labs      ICD (implantable cardioverter-defibrillator) in place  Cardiology consutled        VTE Risk Mitigation (From admission, onward)           Ordered     enoxaparin injection 40 mg  Every 24 hours         08/27/24 2052     IP VTE HIGH RISK PATIENT  Once         08/27/24 1731     Place sequential compression device  Until discontinued         08/27/24 1731     Reason for No Pharmacological VTE Prophylaxis  Once        Question:  Reasons:  Answer:  Physician Provided (leave comment)    08/27/24 1731                  Follow labs  IV antibiotics  Cultures pending  Cardiology consulted  Discharge Planning   KILEY:      Code Status: Full Code   Is the patient medically ready for discharge?:     Reason for patient still in hospital (select all that  apply): Patient trending condition, Laboratory test, Treatment, Consult recommendations, and PT / OT recommendations  Discharge Plan A: Home                  Laurnet Garrido MD  Department of Hospital Medicine   [facility] - 6th Floor Medical Telemetry

## 2024-08-28 NOTE — ANESTHESIA PREPROCEDURE EVALUATION
"                                                                                                             08/28/2024  Eliseo Denson is a 54 y.o., male with multiple medical problems as listed here.  TATY today.  He is calm and conversant in holding area today.    "History of Present Illness:  Eliseo Denson is a 54 y.o. male with a PMHx of HFrEF (28%), ICD placement 7/25/24 s/p V-tach, CAD, HTN, HLD, ischemic cardiomyopathy, myocardial infarction in 2008 presenting with acute exacerbation of CHF and Staphylococcus aureus bacteremia. Cardiology consulted for TATY and potential ICD removal.      Interval history:   Pt reports intermittent chest pain both at the region of his ICD which is infected and the L anterior chest. He denies lightheadedness and headache which was previously reported with Entresto. He denies night sweats that he experienced before. Pt denies fever, chills, shortness of breath, and tenderness to palpitation of the chest. Transfer request placed to [facility] for removal of ICD and is awaiting an open bed.          Current Outpatient Medications   Medication Instructions    amiodarone (PACERONE) 200 mg, Oral, Every morning    amLODIPine (NORVASC) 10 mg, Oral, Daily    JAUN CHEWABLE ASPIRIN 81 mg, Oral    clopidogreL (PLAVIX) 75 mg, Oral, Daily    losartan-hydrochlorothiazide 100-25 mg (HYZAAR) 100-25 mg per tablet 1 tablet, Oral, Daily    rosuvastatin (CRESTOR) 10 mg, Oral, Daily     ...    Other Results:  EKG (my interpretation):sinus rhythm with occasional PVCs, left axis deviation, inferior infract, possible anterior infarct with unknown age, T wave abnormality, and possibility of lateral ischemia. Abnormal EKG     TTE:   Results for orders placed during the hospital encounter of 08/23/24     Echo     Interpretation Summary    Left Ventricle: The left ventricle is mildly dilated. Mildly increased wall thickness. There is severely reduced systolic function. Biplane (2D) method of discs " "ejection fraction is 28%.    Right Ventricle: Normal right ventricular cavity size.    Left Atrium: Left atrium is mildly dilated.    Right Atrium: Right atrium is mildly dilated.    Aortic Valve: The aortic valve is a trileaflet valve. There is no stenosis. Aortic valve peak velocity is 1.24 m/s. Mean gradient is 3 mmHg.    Mitral Valve: The mitral valve is structurally normal. The mean pressure gradient across the mitral valve is 1 mmHg at a heart rate of 65 bpm.    Pulmonary Artery: The estimated pulmonary artery systolic pressure is 12 mmHg.    IVC/SVC: Normal venous pressure at 3 mmHg.     ...    MSSA bacteremia  Infected ICD (indication VF/VT)  Possible IE     -removal of infected ICD +/- TATY that cannot be done at this hospital  -transfer patient to [facility] that has the ability to remove ICD leads; discharge awaiting placement  -abx per ID"  Pre-op Assessment    I have reviewed the Patient Summary Reports.     I have reviewed the Nursing Notes. I have reviewed the NPO Status.   I have reviewed the Medications.     Review of Systems  Anesthesia Hx:             Denies Family Hx of Anesthesia complications.    Denies Personal Hx of Anesthesia complications.                    Cardiovascular:    Pacemaker Hypertension   CAD       CHF                                 Pulmonary:  Pulmonary Normal                           Physical Exam  General: Well nourished, Cooperative, Alert and Oriented    Airway:  Mallampati: II   Mouth Opening: Normal  TM Distance: Normal  Tongue: Normal  Neck ROM: Normal ROM    Dental:  Intact    Chest/Lungs:  Normal Respiratory Rate    Heart:  Rate: Normal  Rhythm: Regular Rhythm        Anesthesia Plan  Type of Anesthesia, risks & benefits discussed:    Anesthesia Type: Gen Natural Airway  Intra-op Monitoring Plan: Standard ASA Monitors  Post Op Pain Control Plan: multimodal analgesia  Induction:  IV  Informed Consent: Informed consent signed with the Patient and all parties understand the " risks and agree with anesthesia plan.  All questions answered.   ASA Score: 4  Day of Surgery Review of History & Physical: H&P Update referred to the surgeon/provider.    Ready For Surgery From Anesthesia Perspective.     .

## 2024-08-28 NOTE — PLAN OF CARE
08/28/24 1157   Discharge Assessment   Assessment Type Discharge Planning Assessment   Confirmed/corrected address, phone number and insurance Yes   Source of Information patient   When was your last doctors appointment?   (No PCP)   Reason For Admission Transfer for MSSA Bactermia   People in Home spouse   Do you expect to return to your current living situation? Yes   Do you have help at home or someone to help you manage your care at home? Yes   Who are your caregiver(s) and their phone number(s)? Jozella/Spouse/[phone]   Prior to hospitilization cognitive status: Alert/Oriented   Walking or Climbing Stairs Difficulty no   Dressing/Bathing Difficulty no   Equipment Currently Used at Home none   Readmission within 30 days? Yes   Patient currently being followed by outpatient case management? No   Do you take prescription medications? Yes   Do you have prescription coverage? Yes   Do you have any problems affording any of your prescribed medications? No   Who is going to help you get home at discharge? spouse   How do you get to doctors appointments? car, drives self   Are you on dialysis? No   Do you take coumadin? No   Discharge Plan A Home   Discharge Plan B Home Health   Discharge Plan discussed with: Patient;Spouse/sig other;Adult children   Financial Resource Strain   How hard is it for you to pay for the very basics like food, housing, medical care, and heating? Somewhat   Housing Stability   In the last 12 months, was there a time when you were not able to pay the mortgage or rent on time? N   At any time in the past 12 months, were you homeless or living in a shelter (including now)? N   Transportation Needs   Has the lack of transportation kept you from medical appointments, meetings, work or from getting things needed for daily living? No   Food Insecurity   Within the past 12 months, you worried that your food would run out before you got the money to buy more. Never true   Within the past 12  months, the food you bought just didn't last and you didn't have money to get more. Never true   Alcohol Use   Q1: How often do you have a drink containing alcohol? Never   Q2: How many drinks containing alcohol do you have on a typical day when you are drinking? None   Q3: How often do you have six or more drinks on one occasion? Never   Utilities   In the past 12 months has the electric, gas, oil, or water company threatened to shut off services in your home? No

## 2024-08-28 NOTE — CONSULTS
Infectious Disease  Consult Note    Patient Name: Eliseo Denson   MRN: [id number]   Admission Date: 8/27/2024   Hospital Length of Stay: 2 days  Attending Physician: Donna Solis MD   Primary Care Provider: No, Primary Doctor     Isolation Status: No active isolations       Assessment/Plan:       54 year old male patient with past medical history significant for HTN, HLD, CAD s/p PCI, ICMO, HFrEF (EF 30%), s/p Dual Chamber ICD 7/26/2024 presented to [facility] with SOB on 08/23/2024 and found to have decompensated CHF. He was also febrile and had positive blood cultures for MSSA and tenderness over the ICD pocket as well as discharge from the site. He was transferred to [facility] for evaluation with TATY and CV surgery consideration of ICD removal given this was likely source of bacteremia. Of note, he was recently admitted 1 month prior with ICD placement in the setting of VT (VF) s/p Defibrillation at OSH. ID consulted for assistance in management      MSSA bacteremia  ICD infection  Sepsis 2/2 above  Hx of VF s/p Defibrillation s/p ICD Placement 7/26  ICMO  HFrEF 20%  CAD with prior stenting to LAD and D3  HTN  HLD    Recommendations:  -Continue Oxacillin 2g IV q4h  -Repeat Blood cultures 08/27 with no growth  -consult CV surgery for evaluation of ICD generator removal   -TATY with no clear vegetation on the wires or valves  -No evidence of seeding on physical exam  -Discussed with patient and significant other at bedside    Thank you for your consult. ID will continue following. Please contact us with any questions or concerns.    Antibiotics History:  Vancomycin 08/23 - 08/25  Oxacillin 08/25 - Present    Portions of this note dictated using EMR integrated voice recognition software, and may be subject to voice recognition errors not corrected at proofreading. Please contact writer for clarification if needed.    Subjective:     Principal Problem: MSSA bacteremia     HPI:   54 year old male patient with past medical  history significant for HTN, HLD, CAD s/p PCI, ICMO, HFrEF (EF 30%), s/p Dual Chamber ICD 7/26/2024 presented to [facility] with SOB on 08/23/2024 and found to have decompensated CHF. He was also febrile and had positive blood cultures for MSSA and tenderness over the ICD pocket as well as discharge from the site. He was transferred to [facility] for evaluation with TATY and CV surgery consideration of ICD removal given this was likely source of bacteremia. Of note, he was recently admitted 1 month prior with ICD placement in the setting of VT (VF) s/p Defibrillation at OSH. ID consulted for assistance in management    Feels better on my evaluation, breathing well. Still with tenderness and pain over the site of the ICD pocket    Past Medical History:   Diagnosis Date    CAD (coronary artery disease)     CHF (congestive heart failure)     ICD (implantable cardioverter-defibrillator) in place     ICD (implantable cardioverter-defibrillator) infection     V-tach         Past Surgical History:   Procedure Laterality Date    REVISION OF IMPLANTABLE CARDIOVERTER-DEFIBRILLATOR (ICD) ELECTRODE LEAD PLACEMENT         Review of patient's allergies indicates:  No Known Allergies     Family History    Reviewed and non contributory          Social History     Socioeconomic History    Marital status:      Social Determinants of Health     Financial Resource Strain: Medium Risk (8/28/2024)    Overall Financial Resource Strain (CARDIA)     Difficulty of Paying Living Expenses: Somewhat hard   Food Insecurity: No Food Insecurity (8/28/2024)    Hunger Vital Sign     Worried About Running Out of Food in the Last Year: Never true     Ran Out of Food in the Last Year: Never true   Transportation Needs: No Transportation Needs (8/28/2024)    TRANSPORTATION NEEDS     Transportation : No   Housing Stability: Low Risk  (8/28/2024)    Housing Stability Vital Sign     Unable to Pay for Housing in the Last Year: No     Homeless in the Last Year: No         Lines/Drains/Airways       Peripheral Intravenous Line  Duration                  Peripheral IV - Single Lumen 08/25/24 2250 20 G Posterior;Right Forearm 3 days                     Medication:  Medications Prior to Admission   Medication Sig    amiodarone (PACERONE) 200 MG Tab Take 200 mg by mouth every morning.    amLODIPine (NORVASC) 10 MG tablet Take 10 mg by mouth once daily.    JAUN CHEWABLE ASPIRIN 81 mg Chew Take 81 mg by mouth.    rosuvastatin (CRESTOR) 10 MG tablet Take 10 mg by mouth once daily.    sacubitriL-valsartan (ENTRESTO) 24-26 mg per tablet Take 1 tablet by mouth 2 (two) times daily.          Antimicrobials:  Antibiotics (From admission, onward)      Start     Stop Route Frequency Ordered    08/27/24 1800  oxacillin 2 g in 0.9% NaCl 100 mL IVPB (MB+)         -- IV Every 4 hours (non-standard times) 08/27/24 1729             Antifungals (From admission, onward)      None            Antivirals (From admission, onward)      None               Review of Systems   Review of Systems   All other systems reviewed and are negative.        Objective:     Vital Signs (Most Recent):  Temp: 98.6 °F (37 °C) (08/29/24 0738)  Pulse: 63 (08/29/24 0738)  Resp: 20 (08/29/24 0738)  BP: (!) 149/94 (08/29/24 0738)  SpO2: 97 % (08/29/24 0738)  Vital Signs (24h Range):  Temp:  [98.3 °F (36.8 °C)-98.7 °F (37.1 °C)] 98.6 °F (37 °C)  Pulse:  [7-79] 63  Resp:  [16-20] 20  SpO2:  [90 %-97 %] 97 %  BP: (125-153)/(79-96) 149/94      Weight:   Wt Readings from Last 1 Encounters:   08/27/24 105 kg (231 lb 8 oz)      Body mass index is Body mass index is 32.29 kg/m².     Estimated Creatinine Clearance: Estimated Creatinine Clearance: 99.2 mL/min (based on SCr of 1.05 mg/dL).     Physical Exam  Physical Exam  Constitutional:       Appearance: Normal appearance.   HENT:      Head: Normocephalic and atraumatic.      Mouth/Throat:      Pharynx: No oropharyngeal exudate or posterior oropharyngeal erythema.   Eyes:       Extraocular Movements: Extraocular movements intact.      Pupils: Pupils are equal, round, and reactive to light.   Cardiovascular:      Rate and Rhythm: Normal rate and regular rhythm.      Heart sounds: No murmur heard.     Comments: L chest wall ICD pocket with small ulceration, tenderness but no drainage.   Pulmonary:      Effort: No respiratory distress.      Breath sounds: No wheezing, rhonchi or rales.   Abdominal:      General: Bowel sounds are normal. There is no distension.      Palpations: Abdomen is soft.      Tenderness: There is no abdominal tenderness.   Musculoskeletal:         General: No swelling or tenderness.      Cervical back: Neck supple. No rigidity or tenderness.      Comments: No tenderness to palpation over the spine   Lymphadenopathy:      Cervical: No cervical adenopathy.   Skin:     Findings: No lesion or rash.   Neurological:      General: No focal deficit present.      Mental Status: He is alert and oriented to person, place, and time. Mental status is at baseline.      Cranial Nerves: No cranial nerve deficit.      Motor: No weakness (moving all limbs symmetrically and without weakness).   Psychiatric:         Mood and Affect: Mood normal.         Behavior: Behavior normal.           Significant Labs: CBC:   Recent Labs   Lab 08/28/24  0501 08/29/24  0625   WBC 5.72 7.16   HGB 14.8 14.3   HCT 44.6 43.3    345     CMP:   Recent Labs   Lab 08/28/24  0501 08/29/24  0625    137   K 4.3 4.2    103   CO2 27 27   BUN 16.0 9.3   CREATININE 1.23* 1.05   CALCIUM 8.8 8.9   ALBUMIN 2.8* 2.6*   BILITOT 0.2 0.4   ALKPHOS 107 103   AST 26 33   ALT 29 37         Microbiology Results (last 7 days)       ** No results found for the last 168 hours. **             Significant Imaging: I have reviewed all pertinent imaging results/findings within the past 24 hours.      Hiren Berkowitz MD  Infectious Disease  [facility]

## 2024-08-28 NOTE — NURSING
Nurses Note -- 4 Eyes      8/27/2024   10:09 PM      Skin assessed during: Daily Assessment      [x] No Altered Skin Integrity Present    [x]Prevention Measures Documented      [] Yes- Altered Skin Integrity Present or Discovered   [] LDA Added if Not in Epic (Describe Wound)   [] New Altered Skin Integrity was Present on Admit and Documented in LDA   [] Wound Image Taken    Wound Care Consulted? No    Attending Nurse:  Cary Jimenez RN/Staff Member:   alla

## 2024-08-28 NOTE — TRANSFER OF CARE
Anesthesia Transfer of Care Note    Patient: Eliseo Denson    Procedure(s) Performed: * No procedures listed *    Patient location: Other: cvss    Anesthesia Type: general    Post pain: adequate analgesia    Post assessment: no apparent anesthetic complications    Post vital signs: stable    Level of consciousness: responds to stimulation and sedated    Nausea/Vomiting: no nausea/vomiting    Complications: none    Transfer of care protocol was followed      Last vitals: Visit Vitals  BP (!) 125/94 (BP Location: Right arm, Patient Position: Lying)   Pulse (!) 7   Temp 36.8 °C (98.3 °F) (Oral)   Resp 16   Wt 105 kg (231 lb 8 oz)   SpO2 97%   BMI 32.29 kg/m²

## 2024-08-28 NOTE — HOSPITAL COURSE
8/28/24-No new issues at this time.  Waiting for cultures.  Cardiology consulted for possible ICD infection.

## 2024-08-28 NOTE — NURSING
Nurses Note -- 4 Eyes      8/28/2024   2:46 PM      Skin assessed during: Q Shift Change      [x] No Altered Skin Integrity Present    []Prevention Measures Documented      [] Yes- Altered Skin Integrity Present or Discovered   [] LDA Added if Not in Epic (Describe Wound)   [] New Altered Skin Integrity was Present on Admit and Documented in LDA   [] Wound Image Taken    Wound Care Consulted? No    Attending Nurse:  Sarahi Jimenez RN/Staff Member:  Cayr

## 2024-08-28 NOTE — CONSULTS
[facility] - 6th Floor Medical Telemetry    Electrophysiology  Consult Note    Patient Name: Eliseo Denson  MRN: [id number]  Admission Date: 8/27/2024  Hospital Length of Stay: 1 days  Code Status: Full Code   Attending Provider: Donna Solis MD   Consulting Provider: Merry Kwon RN  Primary Care Physician: Simin, Primary Doctor  Principal Problem:MSSA bacteremia    Patient information was obtained from patient, past medical records, ER records, and primary team.     Subjective:     Chief Complaint/Reason for Consult: TATY/Infected Dual Chamber ICD    HPI: Mr. Denson is a 54 y.o.male unknown to [facility]. Patient states that he was previously hospitalized approximately 1 month ago. He presented to [facility] ED and was found to have a myocardial infarction at that time. He states that beginning approximately 3 days ago he began to experience worsening shortness of breath that initially began with ambulation only. Patient was previously able to ambulate without limitations prior to 3 days ago. He started breath approximate 20 ft which progressively worsened to shortness of breath at rest with worsening orthopnea. He normally rest with 2 pillows underneath him at night and had add another 2 pillows beneath him in order to sleep. He also notes experiencing been bendopnea over the last 3 days as well. Yesterday evening patient experienced new onset left apical sharp, stabbing 6/10 chest pain and worsening shortness of breath. Symptoms persisted for multiple hours as patient was unable to sleep prompting wife to bring patient to ED for evaluation at that time. Patient presented with shortness of breath 1 month after having ICD placed (7.25.24). Found to have worsened EF from last, now 28%. Likely HFrEF exacerbation. Started on GDMT and dyspnea improved. On this admission, patient found to have blood cultures + for MSSA. Started on vancomycin and de-escalated to oxacillin. Previous TTE showed no vegetations or  significant regurgitation. [facility] was Consulted for TATY and removal of ICD.    PMH: hypertension, hyperlipidemia, CAD status post stent placement, NSTEMI, ischemic cardiomyopathy, V-tach, CHF, IE, heart failure reduced ejection fraction (EF 28%: 24),  PSH: LHC, DICD, TATY, TTE  Family History: Brother 6 bypasses and open heart surgery in 2024. Father  with history of ICD requirement. Multiple maternal uncles with heart disease.   Social History: Quit smoking in . Reports occasional alcohol use and Marijuana use. Reports little to no exercise for many years.     Previous Cardiac Diagnostics:     LHC: 24  Moderate nonobstructive CAD with patent stent(s) mid LAD and D3 with   chronic occlusion of mid Circumflex with distal Circumflex and OM3 filling   via well established right-to-left collaterals.    Elevated LVEDP (24 mm Hg).       ECHO: 24  Left Ventricle: The left ventricle is mildly dilated. Mildly increased wall thickness. There is severely reduced systolic function. Biplane (2D) method of discs ejection fraction is 28%.  Right Ventricle: Normal right ventricular cavity size.  Left Atrium: Left atrium is mildly dilated.  Right Atrium: Right atrium is mildly dilated.  Aortic Valve: The aortic valve is a trileaflet valve. There is no stenosis. Aortic valve peak velocity is 1.24 m/s. Mean gradient is 3 mmHg.  Mitral Valve: The mitral valve is structurally normal. The mean pressure gradient across the mitral valve is 1 mmHg at a heart rate of 65 bpm.  Pulmonary Artery: The estimated pulmonary artery systolic pressure is 12 mmHg.  IVC/SVC: Normal venous pressure at 3 mmHg.    TTE:24  Left Ventricle: Moderatel to severely decreased (30-34%) ejection   fraction. Global hypokinesis with more significant inferior wall   hypokinesis.   Mitral Valve: Mild mitral regurgitation.   Tricuspid Valve: Mild tricuspid regurgitation.   Pericardium: No pericardial effusion.     ICD Placement:  7.26.24  Pacing and sensing thresholds were adequate in both chambers.  For further   details please see the separate report provided by the vendor.   Vendor: [employer]    CTA chest: 8.23.24  Negative for pulmonary thromboembolic disease.  Patchy bilateral ground-glass opacities suspicious for infection though I suspect an element of pulmonary edema as well given septal thickening.  Trace right pleural effusion.  No significant discrepancy with the preliminary report.    Review of patient's allergies indicates:  No Known Allergies    Current Facility-Administered Medications on File Prior to Encounter   Medication    [DISCONTINUED] acetaminophen tablet 1,000 mg    [DISCONTINUED] amiodarone tablet 200 mg    [DISCONTINUED] atorvastatin tablet 80 mg    [DISCONTINUED] carvediloL tablet 6.25 mg    [DISCONTINUED] cetirizine tablet 10 mg    [DISCONTINUED] dextromethorphan-guaiFENesin  mg/5 ml liquid 10 mL    [DISCONTINUED] dextrose 10% bolus 125 mL 125 mL    [DISCONTINUED] dextrose 10% bolus 250 mL 250 mL    [DISCONTINUED] glucagon (human recombinant) injection 1 mg    [DISCONTINUED] glucose chewable tablet 16 g    [DISCONTINUED] glucose chewable tablet 24 g    [DISCONTINUED] heparin (porcine) injection 5,000 Units    [DISCONTINUED] labetalol 20 mg/4 mL (5 mg/mL) IV syring    [DISCONTINUED] melatonin tablet 6 mg    [DISCONTINUED] naloxone 0.4 mg/mL injection 0.02 mg    [DISCONTINUED] nicotine 21 mg/24 hr 1 patch    [DISCONTINUED] nitroGLYCERIN SL tablet 0.4 mg    [DISCONTINUED] ondansetron disintegrating tablet 8 mg    [DISCONTINUED] oxacillin 2 g in 0.9% NaCl 100 mL IVPB    [DISCONTINUED] polyethylene glycol packet 17 g    [DISCONTINUED] prochlorperazine injection Soln 5 mg    [DISCONTINUED] sacubitriL-valsartan 24-26 mg per tablet 1 tablet    [DISCONTINUED] senna-docusate 8.6-50 mg per tablet 1 tablet    [DISCONTINUED] simethicone chewable tablet 80 mg    [DISCONTINUED] sodium chloride 0.9% flush 10 mL     Current  Outpatient Medications on File Prior to Encounter   Medication Sig    amiodarone (PACERONE) 200 MG Tab Take 200 mg by mouth every morning.    amLODIPine (NORVASC) 10 MG tablet Take 10 mg by mouth once daily.    JAUN CHEWABLE ASPIRIN 81 mg Chew Take 81 mg by mouth.    rosuvastatin (CRESTOR) 10 MG tablet Take 10 mg by mouth once daily.    sacubitriL-valsartan (ENTRESTO) 24-26 mg per tablet Take 1 tablet by mouth 2 (two) times daily.     Review of Systems   Respiratory:  Negative for chest tightness and shortness of breath.    Cardiovascular:  Negative for chest pain and palpitations.   Endocrine: Negative for polyuria.   Neurological:  Negative for dizziness and syncope.   All other systems reviewed and are negative.    Objective:     Vital Signs (Most Recent):  Temp: 98.8 °F (37.1 °C) (08/28/24 0729)  Pulse: 64 (08/28/24 0729)  Resp: 18 (08/28/24 0816)  BP: (!) 153/99 (08/28/24 0818)  SpO2: 96 % (08/28/24 0729) Vital Signs (24h Range):  Temp:  [97.9 °F (36.6 °C)-98.8 °F (37.1 °C)] 98.8 °F (37.1 °C)  Pulse:  [61-72] 64  Resp:  [18] 18  SpO2:  [92 %-98 %] 96 %  BP: (121-153)/(78-99) 153/99   Weight: 105 kg (231 lb 8 oz)  Body mass index is 32.29 kg/m².  SpO2: 96 %       Intake/Output Summary (Last 24 hours) at 8/28/2024 0824  Last data filed at 8/28/2024 0646  Gross per 24 hour   Intake 480 ml   Output 0 ml   Net 480 ml     Lines/Drains/Airways       Peripheral Intravenous Line  Duration                  Peripheral IV - Single Lumen 08/25/24 2250 20 G Posterior;Right Forearm 2 days                  Significant Labs:  Recent Results (from the past 72 hour(s))   VANCOMYCIN, TROUGH    Collection Time: 08/25/24 11:26 AM   Result Value Ref Range    Vancomycin Trough 12.3 (L) 15.0 - 20.0 ug/ml   CBC with Differential    Collection Time: 08/26/24  6:20 AM   Result Value Ref Range    WBC 4.69 4.50 - 11.50 x10(3)/mcL    RBC 5.86 4.70 - 6.10 x10(6)/mcL    Hgb 17.7 14.0 - 18.0 g/dL    Hct 53.0 (H) 42.0 - 52.0 %    MCV 90.4 80.0  - 94.0 fL    MCH 30.2 27.0 - 31.0 pg    MCHC 33.4 33.0 - 36.0 g/dL    RDW 12.7 11.5 - 17.0 %    Platelet 301 130 - 400 x10(3)/mcL    MPV 9.7 7.4 - 10.4 fL    Neut % 65.6 %    Lymph % 14.9 %    Mono % 16.8 %    Eos % 1.9 %    Basophil % 0.6 %    Lymph # 0.70 0.6 - 4.6 x10(3)/mcL    Neut # 3.07 2.1 - 9.2 x10(3)/mcL    Mono # 0.79 0.1 - 1.3 x10(3)/mcL    Eos # 0.09 0 - 0.9 x10(3)/mcL    Baso # 0.03 <=0.2 x10(3)/mcL    IG# 0.01 0 - 0.04 x10(3)/mcL    IG% 0.2 %    NRBC% 0.0 %   Comprehensive Metabolic Panel (CMP)    Collection Time: 08/26/24  6:24 AM   Result Value Ref Range    Sodium 135 (L) 136 - 145 mmol/L    Potassium 3.6 3.5 - 5.1 mmol/L    Chloride 96 (L) 98 - 107 mmol/L    CO2 29 22 - 29 mmol/L    Glucose 121 (H) 74 - 100 mg/dL    Blood Urea Nitrogen 21.2 8.4 - 25.7 mg/dL    Creatinine 1.25 (H) 0.73 - 1.18 mg/dL    Calcium 9.7 8.4 - 10.2 mg/dL    Protein Total 7.5 6.4 - 8.3 gm/dL    Albumin 2.9 (L) 3.5 - 5.0 g/dL    Globulin 4.6 (H) 2.4 - 3.5 gm/dL    Albumin/Globulin Ratio 0.6 (L) 1.1 - 2.0 ratio    Bilirubin Total 0.5 <=1.5 mg/dL     40 - 150 unit/L    ALT 35 0 - 55 unit/L    AST 37 (H) 5 - 34 unit/L    eGFR >60 mL/min/1.73/m2    Anion Gap 10.0 mEq/L    BUN/Creatinine Ratio 17    Magnesium    Collection Time: 08/26/24  6:24 AM   Result Value Ref Range    Magnesium Level 2.60 1.60 - 2.60 mg/dL   Phosphorus    Collection Time: 08/26/24  6:24 AM   Result Value Ref Range    Phosphorus Level 3.0 2.3 - 4.7 mg/dL   Hepatitis B Surface Ab, Qualitative    Collection Time: 08/26/24  6:24 AM   Result Value Ref Range    Hep BsAb Interp Nonreactive Nonreactive    Hep BsAb 0.23 mIU/mL   Hepatitis Panel, Acute    Collection Time: 08/26/24  6:24 AM   Result Value Ref Range    Hep A IgM Interp Nonreactive Nonreactive    Hep B Core IgM Interp Nonreactive Nonreactive    Hep BsAg Interp Nonreactive Nonreactive    Hep C Ab Interp Nonreactive Nonreactive   Chlamydia/GC, PCR    Collection Time: 08/26/24  1:47 PM    Specimen:  Urine   Result Value Ref Range    Chlamydia trachomatis PCR Not Detected Not Detected    N. gonorrhea PCR Not Detected Not Detected    Source Urine    Comprehensive Metabolic Panel (CMP)    Collection Time: 08/27/24  5:35 AM   Result Value Ref Range    Sodium 136 136 - 145 mmol/L    Potassium 3.5 3.5 - 5.1 mmol/L    Chloride 100 98 - 107 mmol/L    CO2 27 22 - 29 mmol/L    Glucose 100 74 - 100 mg/dL    Blood Urea Nitrogen 15.9 8.4 - 25.7 mg/dL    Creatinine 1.21 (H) 0.73 - 1.18 mg/dL    Calcium 9.1 8.4 - 10.2 mg/dL    Protein Total 7.0 6.4 - 8.3 gm/dL    Albumin 2.7 (L) 3.5 - 5.0 g/dL    Globulin 4.3 (H) 2.4 - 3.5 gm/dL    Albumin/Globulin Ratio 0.6 (L) 1.1 - 2.0 ratio    Bilirubin Total 0.3 <=1.5 mg/dL     40 - 150 unit/L    ALT 28 0 - 55 unit/L    AST 23 5 - 34 unit/L    eGFR >60 mL/min/1.73/m2    Anion Gap 9.0 mEq/L    BUN/Creatinine Ratio 13    Magnesium    Collection Time: 08/27/24  5:35 AM   Result Value Ref Range    Magnesium Level 2.50 1.60 - 2.60 mg/dL   Phosphorus    Collection Time: 08/27/24  5:35 AM   Result Value Ref Range    Phosphorus Level 2.9 2.3 - 4.7 mg/dL   C-Reactive Protein    Collection Time: 08/27/24  5:35 AM   Result Value Ref Range    CRP 80.20 (H) <5.00 mg/L   Sedimentation Rate    Collection Time: 08/27/24  5:35 AM   Result Value Ref Range    Sed Rate 63 (H) 0 - 15 mm/hr   Hepatitis A antibody, IgG    Collection Time: 08/27/24  5:35 AM   Result Value Ref Range    Hep A IgG Interp Nonreactive Nonreactive   HIV 1/2 Ag/Ab (4th Gen)    Collection Time: 08/27/24  5:35 AM   Result Value Ref Range    HIV Nonreactive Nonreactive   SYPHILIS ANTIBODY (WITH REFLEX RPR)    Collection Time: 08/27/24  5:35 AM   Result Value Ref Range    Syphilis Antibody Nonreactive Nonreactive, Equivocal   CBC with Differential    Collection Time: 08/27/24  5:35 AM   Result Value Ref Range    WBC 5.13 4.50 - 11.50 x10(3)/mcL    RBC 5.25 4.70 - 6.10 x10(6)/mcL    Hgb 16.1 14.0 - 18.0 g/dL    Hct 46.7 42.0 - 52.0  %    MCV 89.0 80.0 - 94.0 fL    MCH 30.7 27.0 - 31.0 pg    MCHC 34.5 33.0 - 36.0 g/dL    RDW 12.5 11.5 - 17.0 %    Platelet 307 130 - 400 x10(3)/mcL    MPV 9.9 7.4 - 10.4 fL    Neut % 52.6 %    Lymph % 22.8 %    Mono % 21.1 %    Eos % 2.3 %    Basophil % 0.8 %    Lymph # 1.17 0.6 - 4.6 x10(3)/mcL    Neut # 2.70 2.1 - 9.2 x10(3)/mcL    Mono # 1.08 0.1 - 1.3 x10(3)/mcL    Eos # 0.12 0 - 0.9 x10(3)/mcL    Baso # 0.04 <=0.2 x10(3)/mcL    IG# 0.02 0 - 0.04 x10(3)/mcL    IG% 0.4 %    NRBC% 0.0 %   Light Blue Top Hold    Collection Time: 08/27/24  8:20 AM   Result Value Ref Range    Extra Tube Hold for add-ons.    Red Top Hold    Collection Time: 08/27/24  8:20 AM   Result Value Ref Range    Extra Tube Hold for add-ons.    Light Green Top Hold    Collection Time: 08/27/24  8:20 AM   Result Value Ref Range    Extra Tube Hold for add-ons.    Light Green Top Hold    Collection Time: 08/27/24  8:20 AM   Result Value Ref Range    Extra Tube Hold for add-ons.    Lavender Top Hold    Collection Time: 08/27/24  8:20 AM   Result Value Ref Range    Extra Tube Hold for add-ons.    Gold Top Hold    Collection Time: 08/27/24  8:20 AM   Result Value Ref Range    Extra Tube Hold for add-ons.    Pink Top Hold    Collection Time: 08/27/24  8:20 AM   Result Value Ref Range    Extra Tube Hold for add-ons.    Comprehensive Metabolic Panel    Collection Time: 08/28/24  5:01 AM   Result Value Ref Range    Sodium 139 136 - 145 mmol/L    Potassium 4.3 3.5 - 5.1 mmol/L    Chloride 104 98 - 107 mmol/L    CO2 27 22 - 29 mmol/L    Glucose 110 (H) 74 - 100 mg/dL    Blood Urea Nitrogen 16.0 8.4 - 25.7 mg/dL    Creatinine 1.23 (H) 0.73 - 1.18 mg/dL    Calcium 8.8 8.4 - 10.2 mg/dL    Protein Total 6.1 (L) 6.4 - 8.3 gm/dL    Albumin 2.8 (L) 3.5 - 5.0 g/dL    Globulin 3.3 2.4 - 3.5 gm/dL    Albumin/Globulin Ratio 0.8 (L) 1.1 - 2.0 ratio    Bilirubin Total 0.2 <=1.5 mg/dL     40 - 150 unit/L    ALT 29 0 - 55 unit/L    AST 26 5 - 34 unit/L    eGFR >60  mL/min/1.73/m2    Anion Gap 8.0 mEq/L    BUN/Creatinine Ratio 13    Magnesium    Collection Time: 08/28/24  5:01 AM   Result Value Ref Range    Magnesium Level 2.10 1.60 - 2.60 mg/dL   Phosphorus    Collection Time: 08/28/24  5:01 AM   Result Value Ref Range    Phosphorus Level 3.1 2.3 - 4.7 mg/dL   C-Reactive Protein    Collection Time: 08/28/24  5:01 AM   Result Value Ref Range    CRP 58.50 (H) <5.00 mg/L   Sedimentation rate    Collection Time: 08/28/24  5:01 AM   Result Value Ref Range    Sed Rate 52 (H) 0 - 15 mm/hr   BNP    Collection Time: 08/28/24  5:01 AM   Result Value Ref Range    Natriuretic Peptide 176.0 (H) <=100.0 pg/mL   CBC with Differential    Collection Time: 08/28/24  5:01 AM   Result Value Ref Range    WBC 5.72 4.50 - 11.50 x10(3)/mcL    RBC 4.89 4.70 - 6.10 x10(6)/mcL    Hgb 14.8 14.0 - 18.0 g/dL    Hct 44.6 42.0 - 52.0 %    MCV 91.2 80.0 - 94.0 fL    MCH 30.3 27.0 - 31.0 pg    MCHC 33.2 33.0 - 36.0 g/dL    RDW 13.1 11.5 - 17.0 %    Platelet 330 130 - 400 x10(3)/mcL    MPV 9.6 7.4 - 10.4 fL    Neut % 58.2 %    Lymph % 22.9 %    Mono % 15.4 %    Eos % 2.8 %    Basophil % 0.5 %    Lymph # 1.31 0.6 - 4.6 x10(3)/mcL    Neut # 3.33 2.1 - 9.2 x10(3)/mcL    Mono # 0.88 0.1 - 1.3 x10(3)/mcL    Eos # 0.16 0 - 0.9 x10(3)/mcL    Baso # 0.03 <=0.2 x10(3)/mcL    IG# 0.01 0 - 0.04 x10(3)/mcL    IG% 0.2 %    NRBC% 0.0 %     Significant Imaging:    EKG:       Telemetry:  SR    Physical Exam  Vitals and nursing note reviewed.   Constitutional:       Appearance: Normal appearance. He is obese.   HENT:      Head: Normocephalic.      Mouth/Throat:      Mouth: Mucous membranes are moist.   Eyes:      Extraocular Movements: Extraocular movements intact.   Abdominal:      General: There is distension.      Palpations: Abdomen is soft.   Musculoskeletal:         General: Normal range of motion.   Skin:     General: Skin is warm and dry.      Findings: Lesion present.      Comments: Left upper chest wall incision;  dressing clean, dry and intact.   Neurological:      Mental Status: He is alert and oriented to person, place, and time.   Psychiatric:         Mood and Affect: Mood normal.         Behavior: Behavior normal.       Home Medications:   Current Facility-Administered Medications on File Prior to Encounter   Medication Dose Route Frequency Provider Last Rate Last Admin    [DISCONTINUED] acetaminophen tablet 1,000 mg  1,000 mg Oral Q6H PRN Sudhakar Caicedo MD   1,000 mg at 08/25/24 1409    [DISCONTINUED] amiodarone tablet 200 mg  200 mg Oral QAM Cj Lee MD   200 mg at 08/27/24 0648    [DISCONTINUED] atorvastatin tablet 80 mg  80 mg Oral Daily Jayro Hamilton MD   80 mg at 08/27/24 0838    [DISCONTINUED] carvediloL tablet 6.25 mg  6.25 mg Oral BID Cj Lee MD   6.25 mg at 08/27/24 0838    [DISCONTINUED] cetirizine tablet 10 mg  10 mg Oral Daily PRN Cj Lee MD        [DISCONTINUED] dextromethorphan-guaiFENesin  mg/5 ml liquid 10 mL  10 mL Oral Q6H PRN Stephani Patel MD   10 mL at 08/26/24 2357    [DISCONTINUED] dextrose 10% bolus 125 mL 125 mL  12.5 g Intravenous PRN Cj Lee MD        [DISCONTINUED] dextrose 10% bolus 250 mL 250 mL  25 g Intravenous PRN Cj Lee MD        [DISCONTINUED] glucagon (human recombinant) injection 1 mg  1 mg Intramuscular PRN Cj Lee MD        [DISCONTINUED] glucose chewable tablet 16 g  16 g Oral PRN Cj Lee MD        [DISCONTINUED] glucose chewable tablet 24 g  24 g Oral PRN Cj Lee MD        [DISCONTINUED] heparin (porcine) injection 5,000 Units  5,000 Units Subcutaneous Q12H Cj Lee MD   5,000 Units at 08/27/24 0841    [DISCONTINUED] labetalol 20 mg/4 mL (5 mg/mL) IV syring  10 mg Intravenous Q4H PRN Cj Lee MD        [DISCONTINUED] melatonin tablet 6 mg  6 mg Oral Nightly PRN Cj Lee MD        [DISCONTINUED] naloxone 0.4 mg/mL injection 0.02 mg  0.02 mg Intravenous PRN Cj Lee MD         [DISCONTINUED] nicotine 21 mg/24 hr 1 patch  1 patch Transdermal Daily PRN Cj Lee MD        [DISCONTINUED] nitroGLYCERIN SL tablet 0.4 mg  0.4 mg Sublingual Q5 Min PRN Cj Lee MD        [DISCONTINUED] ondansetron disintegrating tablet 8 mg  8 mg Oral Q8H PRN Cj Lee MD   8 mg at 08/23/24 1943    [DISCONTINUED] oxacillin 2 g in 0.9% NaCl 100 mL IVPB  2 g Intravenous Q4H Jayro Hamilton MD   Stopped at 08/27/24 1431    [DISCONTINUED] polyethylene glycol packet 17 g  17 g Oral Daily PRN Cj Lee MD        [DISCONTINUED] prochlorperazine injection Soln 5 mg  5 mg Intravenous Q6H PRN Cj Lee MD        [DISCONTINUED] sacubitriL-valsartan 24-26 mg per tablet 1 tablet  1 tablet Oral BID Jayro Hamilton MD   1 tablet at 08/27/24 0839    [DISCONTINUED] senna-docusate 8.6-50 mg per tablet 1 tablet  1 tablet Oral BID PRN Cj Lee MD        [DISCONTINUED] simethicone chewable tablet 80 mg  1 tablet Oral TID PRN Cj Lee MD        [DISCONTINUED] sodium chloride 0.9% flush 10 mL  10 mL Intravenous PRN Cj Lee MD         Current Outpatient Medications on File Prior to Encounter   Medication Sig Dispense Refill    amiodarone (PACERONE) 200 MG Tab Take 200 mg by mouth every morning.      amLODIPine (NORVASC) 10 MG tablet Take 10 mg by mouth once daily.      JAUN CHEWABLE ASPIRIN 81 mg Chew Take 81 mg by mouth.      rosuvastatin (CRESTOR) 10 MG tablet Take 10 mg by mouth once daily.      sacubitriL-valsartan (ENTRESTO) 24-26 mg per tablet Take 1 tablet by mouth 2 (two) times daily. 30 tablet 3     Current Inpatient Medications:    Current Facility-Administered Medications:     acetaminophen tablet 1,000 mg, 1,000 mg, Oral, Q6H PRNElina Brandy L, FNP    aluminum-magnesium hydroxide-simethicone 200-200-20 mg/5 mL suspension 30 mL, 30 mL, Oral, QID PRNElina Brandy L, FNP    amiodarone tablet 200 mg, 200 mg, Oral, Daily, Will, Donna LANG MD, 200 mg at  08/28/24 0818    atorvastatin tablet 80 mg, 80 mg, Oral, Daily, Flora Antoine, FNP, 80 mg at 08/28/24 0818    bisacodyL suppository 10 mg, 10 mg, Rectal, Daily PRN, Flora Antoine FNP    carvediloL tablet 6.25 mg, 6.25 mg, Oral, BID, Flora Antoine, FNP, 6.25 mg at 08/28/24 0818    dextromethorphan-guaiFENesin  mg/5 ml liquid 10 mL, 10 mL, Oral, Q6H PRN, Flora Antoine, FNP    enoxaparin injection 40 mg, 40 mg, Subcutaneous, Q24H (prophylaxis, 1700), Donna Solis MD    hydrALAZINE injection 10 mg, 10 mg, Intravenous, Q4H PRN, Donna Solis MD    melatonin tablet 6 mg, 6 mg, Oral, Nightly PRN, Flora Antoine, FNP    ondansetron injection 4 mg, 4 mg, Intravenous, Q4H PRN, Flora Antoine, FNP    oxacillin 2 g in 0.9% NaCl 100 mL IVPB (MB+), 2 g, Intravenous, Q4H, Flora Antoine FNP, Stopped at 08/28/24 0402    prochlorperazine injection Soln 5 mg, 5 mg, Intravenous, Q6H PRN, Flora Antoine, FNP    sacubitriL-valsartan 24-26 mg per tablet 1 tablet, 1 tablet, Oral, BID, Flora Antoine FNP, 1 tablet at 08/28/24 0818    senna-docusate 8.6-50 mg per tablet 1 tablet, 1 tablet, Oral, BID PRN, Flora Antoine, FNP    sodium chloride 0.9% flush 10 mL, 10 mL, Intravenous, PRN, Flora Antoine, FNP    traMADoL tablet 50 mg, 50 mg, Oral, Q6H PRN, Donna Solis MD, 50 mg at 08/28/24 0816  VTE Risk Mitigation (From admission, onward)           Ordered     enoxaparin injection 40 mg  Every 24 hours         08/27/24 2052     IP VTE HIGH RISK PATIENT  Once         08/27/24 1731     Place sequential compression device  Until discontinued         08/27/24 1731     Reason for No Pharmacological VTE Prophylaxis  Once        Question:  Reasons:  Answer:  Physician Provided (leave comment)    08/27/24 1731                  Assessment:   MSSA Bacteremia (8.26.24)    -Staphylococcus supp.    -Staphylococcus aureus  Dual ICD (7.25.24)    -Mid LAD patent stent with 30% In-stent restenosis, patent stent  in ostial/proximal 3rd diagonal, LCx 100%  distal to OM2, on 2 normal with proximal 50% stenosis, om 3 distal circumflex fell via well-established right-to-left collaterals from proximal RV marginal branch, RCA diffuse 20-30% stenosis, right PDA normal with proximal 40-50% stenosis.   Elevated Sed Rate-improving  Elevated CRP - improving  ROXANNE-stable  Hyperthyroidism-uncontrolled  HTN (Chronic)  CAD    -status post stent placement  ICMO    -ChronicHFrEF (TTE 8/23/2024: LVEF 28%) -compensated  HLD-uncontrolled (7.25.24)    -Chol-207    -  NSTEMI (2008)  IE-negative (TATY 8.28.24)  No H/O  GI Bleeding    Plan:   TATY completed-8.28.24 (per Dr. Mosqueda): procedure was negative for infection or vegetation to ICD leads/device. No evidence of infective endocarditis noted; EF 20%.Recommend   CV surgery be consulted if Primary Team want device removed and Life vest be ordered.  Continue BP management per Primary Team  Continue Antibiotics per ID  Continue to Diuresis per primary team  Continue Amiodarone, Atorvastatin, Entresto, and Coreg   Continue to hold Plavix incase of ICD extraction  Infective Disease needs to be notified if ICD extraction is needed; although TATY was negative.    Will are signing off, but will be available if needed    Thank you for your consult.     Merry Kwon RN  Electrophysiology  [facility]   08/28/2024     I agree with the findings of the complexity of problems addressed and take responsibility for the plan's risks and complications. I approved the plan documented by Merry Kwon RN.

## 2024-08-28 NOTE — HPI
54-year-old male with medical history of CAD/stent 2008, NSVT on amiodarone, ischemic cardiomyopathy, HTN, HLD, cardiologist Dr Arnulfo Bain who recently suffered NSTEMI and unstable VT on 7/24/2024 initially seen at [facility] ED and resuscitated/defibrillated and subsequently transferred to [facility], underwent C7/25/2024 by Dr. Hampton showing obstructive CAD that did not require intervention and underwent dual-chamber ICD (Biotronik) on 07/26/2024 by Dr Hussein and subsequently discharged home the next day.     Presented to [facility] ED on 08/23/2024 with complaint of shortness breath and sharp chest pain along the ICD site as well as worsening s dyspnea on exertion and orthopnea, night sweats and generalized fatigue.  His workup revealed MSSA bacteremia, transthoracic echo show LVEF 28% no evidence of vegetations on aortic or mitral valve, tricuspid and pulmonic valve not well visualized.  CTA chest show no evidence of PE, patchy bilateral ground-glass opacities suspicious for infection or pulmonary edema.  He was evaluated by Internal Medicine, Cardiology and Infectious Disease, transitioned to antibiotic with oxacillin per sensitivities.  Also received few days of IV diuresis and shortness breath has improved  Transferred to [facility] today 08/27/2024 for cardiology/TATY evaluation.     Upon arrival he was afebrile and hemodynamically stable saturating well on room air, in sinus rhythm, report intermittent sharp chest pain.

## 2024-08-28 NOTE — H&P
[facility] Hospital Medicine - H&P Note    Patient Name: Eliseo Denson  MRN: [id number]  PCP: Simin, Primary Doctor  Admitting Physician: Donna Solis MD  Admission Class: IP- Inpatient   Date of Service: 08/27/2024  Code status: Full    Chief Complaint   Transfer from [facility] for MSSA bacteremia, recent ICD placement with concern for device infection/endocarditis    History of Present Illness   This is a 54-year-old male with medical history of CAD/stent 2008, NSVT on amiodarone, ischemic cardiomyopathy, HTN, HLD, cardiologist Dr Arnulfo Bain who recently suffered NSTEMI and unstable VT on 7/24/2024 initially seen at [facility] ED and resuscitated/defibrillated and subsequently transferred to [facility], underwent C7/25/2024 by Dr. Hampton showing obstructive CAD that did not require intervention and underwent dual-chamber ICD (Biotronik) on 07/26/2024 by Dr Hussein and subsequently discharged home the next day.    Presented to [facility] ED on 08/23/2024 with complaint of shortness breath and sharp chest pain along the ICD site as well as worsening s dyspnea on exertion and orthopnea, night sweats and generalized fatigue.  His workup revealed MSSA bacteremia, transthoracic echo show LVEF 28% no evidence of vegetations on aortic or mitral valve, tricuspid and pulmonic valve not well visualized.  CTA chest show no evidence of PE, patchy bilateral ground-glass opacities suspicious for infection or pulmonary edema.  He was evaluated by Internal Medicine, Cardiology and Infectious Disease, transitioned to antibiotic with oxacillin per sensitivities.  Also received few days of IV diuresis and shortness breath has improved  Transferred to [facility] today 08/27/2024 for cardiology/TATY evaluation.    Upon arrival he was afebrile and hemodynamically stable saturating well on room air, in sinus rhythm, report intermittent sharp chest pain.    ROS   Except as documented, all other systems reviewed and negative     Past  Medical History   CAD/stents 2008  [facility] 07/25/2024:  mid LAD patent stent with 30% In-stent restenosis, patent stent in ostial/proximal 3rd diagonal, LCx 100%  distal to OM2, on 2 normal with proximal 50% stenosis, om 3 distal circumflex fell via well-established right-to-left collaterals from proximal RV marginal branch, RCA diffuse 20-30% stenosis, right PDA normal with proximal 40-50% stenosis.    Ischemic cardiomyopathy-ChronicHFrEF (TTE 8/23/2024: LVEF 28%)    Nonsustained VT  Dual-chamber ICD (Biotronik, MR conditional) placement 07/26/2024  Hypertension   Hyperlipidemia      Surgical History   Coronary angiogram/stent 2008   Dual-chamber ICD 07/26/2024    Social History   Denies current smoking, alcohol or illicit drug use    Screening for social drivers of health:  Patient screened for food insecurity, housing instability, transportation needs, utility difficulties, and interpersonal safety:  []Housing or food  []Transportation needs  []Utility difficulties  []Interpersonal safety  [x]None    Family History   Reviewed and negative    Allergies   Patient has no known allergies.    Home Medications     Prior to Admission medications    Medication Sig Start Date End Date Taking? Authorizing Provider   amiodarone (PACERONE) 200 MG Tab Take 200 mg by mouth every morning. 4/16/24   Provider, Historical   amLODIPine (NORVASC) 10 MG tablet Take 10 mg by mouth once daily. 4/16/24   Provider, Historical   JAUN CHEWABLE ASPIRIN 81 mg Chew Take 81 mg by mouth. 4/16/24   Provider, Historical   rosuvastatin (CRESTOR) 10 MG tablet Take 10 mg by mouth once daily. 4/16/24   Provider, Historical   sacubitriL-valsartan (ENTRESTO) 24-26 mg per tablet Take 1 tablet by mouth 2 (two) times daily. 8/27/24   Jayro Hamilton MD   clopidogreL (PLAVIX) 75 mg tablet Take 75 mg by mouth once daily. 4/16/24 8/27/24  Provider, Historical   losartan-hydrochlorothiazide 100-25 mg (HYZAAR) 100-25 mg per tablet Take 1 tablet by  mouth once daily. 4/16/24 8/27/24  Provider, Historical        Physical Exam   Vital Signs  Temp:  [97.9 °F (36.6 °C)-98.3 °F (36.8 °C)]   Pulse:  [66-72]   Resp:  [18]   BP: (121-137)/(80-91)   SpO2:  [92 %-98 %]    General: Appears comfortable  HEENT: NC/AT, left chest wall ICD device with no erythema or drainage from the incision no fluctuance  Neck:  No JVD  Chest: CTABL  CVS: Regular rhythm. Normal S1/S2.  No pedal edema  Abdomen: nondistended, normoactive BS, soft and non-tender.  MSK: No obvious deformity or joint swelling  Skin: Warm and dry  Neuro: AAOx3, no focal neurological deficit  Psych: Cooperative    Labs     Recent Labs     08/26/24  0620 08/27/24  0535   WBC 4.69 5.13   RBC 5.86 5.25   HGB 17.7 16.1   HCT 53.0* 46.7   MCV 90.4 89.0   MCH 30.2 30.7   MCHC 33.4 34.5   RDW 12.7 12.5    307     Recent Labs     08/27/24  0535   SEDRATE 63*   CRP 80.20*      Recent Labs     08/26/24  0624 08/27/24  0535   * 136   K 3.6 3.5   CO2 29 27   BUN 21.2 15.9   CREATININE 1.25* 1.21*   EGFRNORACEVR >60 >60   GLUCOSE 121* 100   CALCIUM 9.7 9.1   MG 2.60 2.50   PHOS 3.0 2.9   ALBUMIN 2.9* 2.7*   GLOBULIN 4.6* 4.3*   ALKPHOS 106 100   ALT 35 28   AST 37* 23   BILITOT 0.5 0.3           Imaging       Echo    Left Ventricle: The left ventricle is mildly dilated. Mildly increased   wall thickness. There is severely reduced systolic function. Biplane (2D)   method of discs ejection fraction is 28%.    Right Ventricle: Normal right ventricular cavity size.    Left Atrium: Left atrium is mildly dilated.    Right Atrium: Right atrium is mildly dilated.    Aortic Valve: The aortic valve is a trileaflet valve. There is no   stenosis. Aortic valve peak velocity is 1.24 m/s. Mean gradient is 3 mmHg.    Mitral Valve: The mitral valve is structurally normal. The mean   pressure gradient across the mitral valve is 1 mmHg at a heart rate of 65   bpm.    Pulmonary Artery: The estimated pulmonary artery systolic pressure  is   12 mmHg.    IVC/SVC: Normal venous pressure at 3 mmHg.  CTA Chest Non-Coronary (PE Studies)  Narrative: EXAMINATION:  CTA CHEST NON CORONARY (PE STUDIES)    CLINICAL HISTORY:  Pulmonary embolism (PE) suspected, positive D-dimer;    TECHNIQUE:  Helical acquisition through the chest with IV contrast targeting the pulmonary arteries. Multiplanar and 3D MIP reconstructed images were provided for review.  mGycm. Automatic exposure control, adjustment of mA/kV or iterative reconstruction technique was used to reduce radiation.    COMPARISON:  None available.    FINDINGS:  There is good opacification of the pulmonary arterial tree. There is no evidence of pulmonary thromboembolism.  There is no aortic dissection.    There is no mediastinal, hilar or axillary lymphadenopathy by size criteria.    Heart is not significantly enlarged.  There is no pericardial effusion.  There are coronary artery calcifications and probably stents.    There is trace right pleural effusion.  There are patchy ground-glass predominant opacities in both lungs, right greater than left.  No dense consolidation.  There is some septal thickening.    There are no acute findings in the imaged upper abdomen.  There is a small hiatal hernia.  There are no acute osseous findings.  Impression: 1. Negative for pulmonary thromboembolic disease.  2. Patchy bilateral ground-glass opacities suspicious for infection though I suspect an element of pulmonary edema as well given septal thickening.  3. Trace right pleural effusion.  4. No significant discrepancy with the preliminary report.    Electronically signed by: Carlos Georges  Date:    08/23/2024  Time:    07:09  X-Ray Chest AP Portable  Narrative: EXAMINATION:  XR CHEST AP PORTABLE    CLINICAL HISTORY:  Dyspnea, unspecified    COMPARISON:  24 July 2024    FINDINGS:  Frontal view of the chest was obtained. There is left-sided AICD.  The heart is enlarged.  There are bilateral interstitial predominant  opacities.  No pneumothorax.  Impression: Bilateral interstitial predominant opacities, infection versus edema.    Electronically signed by: Carlos Georges  Date:    08/23/2024  Time:    06:11    Assessment   Sepsis secondary to MSSA bacteremia  Concern for endocarditis-ICD device/lead infection  Blood cultures 08/23/2024:  MSSA  Blood cultures 08/25/2024:  Positive/MSSA  Blood cultures 08/27/2024:  In process    Ischemic CMO- Chronic HFrEF 28% -compensated    Nonsustained VT - currently sinus rhythm    ROXANNE- improving/stable    History of HTN, HLD, CAD/remote stents      Plan   NPO after midnight except medication   Cardiology consult  Infectious Disease consult  Continue oxacillin 2 g IV q.4 hours--- can consider switching to cefazolin for ease/less frequency of administration.  Repeat ESR and CRP  Follow up repeat blood culture from today 08/27/2024  Resume amiodarone, carvedilol, Entresto, statin  Hold Plavix in case needs ICD extraction.  VTE Prophylaxis:  Enoxaparin 40 mg subQ daily     Critical care time: 35 minutes  Critical care diagnosis:  Sepsis/MSSA bacteremia    Donna Solis MD  Internal Medicine  8/27/2024. at 8:51 PM.

## 2024-08-29 LAB
ALBUMIN SERPL-MCNC: 2.6 G/DL (ref 3.5–5)
ALBUMIN/GLOB SERPL: 0.7 RATIO (ref 1.1–2)
ALP SERPL-CCNC: 103 UNIT/L (ref 40–150)
ALT SERPL-CCNC: 37 UNIT/L (ref 0–55)
ANION GAP SERPL CALC-SCNC: 7 MEQ/L
AST SERPL-CCNC: 33 UNIT/L (ref 5–34)
BACTERIA BLD CULT: ABNORMAL
BACTERIA BLD CULT: ABNORMAL
BASOPHILS # BLD AUTO: 0.03 X10(3)/MCL
BASOPHILS NFR BLD AUTO: 0.4 %
BILIRUB SERPL-MCNC: 0.4 MG/DL
BUN SERPL-MCNC: 9.3 MG/DL (ref 8.4–25.7)
CALCIUM SERPL-MCNC: 8.9 MG/DL (ref 8.4–10.2)
CHLORIDE SERPL-SCNC: 103 MMOL/L (ref 98–107)
CO2 SERPL-SCNC: 27 MMOL/L (ref 22–29)
CREAT SERPL-MCNC: 1.05 MG/DL (ref 0.73–1.18)
CREAT/UREA NIT SERPL: 9
EOSINOPHIL # BLD AUTO: 0.19 X10(3)/MCL (ref 0–0.9)
EOSINOPHIL NFR BLD AUTO: 2.7 %
ERYTHROCYTE [DISTWIDTH] IN BLOOD BY AUTOMATED COUNT: 13.2 % (ref 11.5–17)
GFR SERPLBLD CREATININE-BSD FMLA CKD-EPI: >60 ML/MIN/1.73/M2
GLOBULIN SER-MCNC: 4 GM/DL (ref 2.4–3.5)
GLUCOSE SERPL-MCNC: 102 MG/DL (ref 74–100)
GRAM STN SPEC: ABNORMAL
HCT VFR BLD AUTO: 43.3 % (ref 42–52)
HGB BLD-MCNC: 14.3 G/DL (ref 14–18)
IMM GRANULOCYTES # BLD AUTO: 0.03 X10(3)/MCL (ref 0–0.04)
IMM GRANULOCYTES NFR BLD AUTO: 0.4 %
LYMPHOCYTES # BLD AUTO: 1.34 X10(3)/MCL (ref 0.6–4.6)
LYMPHOCYTES NFR BLD AUTO: 18.7 %
MAGNESIUM SERPL-MCNC: 2 MG/DL (ref 1.6–2.6)
MCH RBC QN AUTO: 30 PG (ref 27–31)
MCHC RBC AUTO-ENTMCNC: 33 G/DL (ref 33–36)
MCV RBC AUTO: 91 FL (ref 80–94)
MONOCYTES # BLD AUTO: 0.76 X10(3)/MCL (ref 0.1–1.3)
MONOCYTES NFR BLD AUTO: 10.6 %
NEUTROPHILS # BLD AUTO: 4.81 X10(3)/MCL (ref 2.1–9.2)
NEUTROPHILS NFR BLD AUTO: 67.2 %
NRBC BLD AUTO-RTO: 0 %
PLATELET # BLD AUTO: 345 X10(3)/MCL (ref 130–400)
PMV BLD AUTO: 9.5 FL (ref 7.4–10.4)
POTASSIUM SERPL-SCNC: 4.2 MMOL/L (ref 3.5–5.1)
PROT SERPL-MCNC: 6.6 GM/DL (ref 6.4–8.3)
RBC # BLD AUTO: 4.76 X10(6)/MCL (ref 4.7–6.1)
SODIUM SERPL-SCNC: 137 MMOL/L (ref 136–145)
WBC # BLD AUTO: 7.16 X10(3)/MCL (ref 4.5–11.5)

## 2024-08-29 PROCEDURE — 63600175 PHARM REV CODE 636 W HCPCS: Performed by: NURSE PRACTITIONER

## 2024-08-29 PROCEDURE — 25000003 PHARM REV CODE 250: Performed by: NURSE PRACTITIONER

## 2024-08-29 PROCEDURE — 25000003 PHARM REV CODE 250: Performed by: INTERNAL MEDICINE

## 2024-08-29 PROCEDURE — 99233 SBSQ HOSP IP/OBS HIGH 50: CPT | Mod: ,,, | Performed by: GENERAL PRACTICE

## 2024-08-29 PROCEDURE — 99223 1ST HOSP IP/OBS HIGH 75: CPT | Mod: 57,,, | Performed by: PHYSICIAN ASSISTANT

## 2024-08-29 PROCEDURE — 83735 ASSAY OF MAGNESIUM: CPT | Performed by: INTERNAL MEDICINE

## 2024-08-29 PROCEDURE — 85025 COMPLETE CBC W/AUTO DIFF WBC: CPT | Performed by: INTERNAL MEDICINE

## 2024-08-29 PROCEDURE — 80053 COMPREHEN METABOLIC PANEL: CPT | Performed by: INTERNAL MEDICINE

## 2024-08-29 PROCEDURE — 63600175 PHARM REV CODE 636 W HCPCS: Performed by: INTERNAL MEDICINE

## 2024-08-29 PROCEDURE — 36415 COLL VENOUS BLD VENIPUNCTURE: CPT | Performed by: INTERNAL MEDICINE

## 2024-08-29 PROCEDURE — 11000001 HC ACUTE MED/SURG PRIVATE ROOM

## 2024-08-29 RX ADMIN — OXACILLIN 2 G: 2 INJECTION, POWDER, FOR SOLUTION INTRAMUSCULAR; INTRAVENOUS at 04:08

## 2024-08-29 RX ADMIN — TRAMADOL HYDROCHLORIDE 50 MG: 50 TABLET, COATED ORAL at 04:08

## 2024-08-29 RX ADMIN — ATORVASTATIN CALCIUM 80 MG: 40 TABLET, FILM COATED ORAL at 09:08

## 2024-08-29 RX ADMIN — TRAMADOL HYDROCHLORIDE 50 MG: 50 TABLET, COATED ORAL at 11:08

## 2024-08-29 RX ADMIN — ENOXAPARIN SODIUM 40 MG: 40 INJECTION SUBCUTANEOUS at 04:08

## 2024-08-29 RX ADMIN — OXACILLIN 2 G: 2 INJECTION, POWDER, FOR SOLUTION INTRAMUSCULAR; INTRAVENOUS at 09:08

## 2024-08-29 RX ADMIN — CARVEDILOL 6.25 MG: 3.12 TABLET, FILM COATED ORAL at 09:08

## 2024-08-29 RX ADMIN — SACUBITRIL AND VALSARTAN 1 TABLET: 24; 26 TABLET, FILM COATED ORAL at 09:08

## 2024-08-29 RX ADMIN — OXACILLIN 2 G: 2 INJECTION, POWDER, FOR SOLUTION INTRAMUSCULAR; INTRAVENOUS at 05:08

## 2024-08-29 RX ADMIN — OXACILLIN 2 G: 2 INJECTION, POWDER, FOR SOLUTION INTRAMUSCULAR; INTRAVENOUS at 11:08

## 2024-08-29 RX ADMIN — TRAMADOL HYDROCHLORIDE 50 MG: 50 TABLET, COATED ORAL at 09:08

## 2024-08-29 RX ADMIN — AMIODARONE HYDROCHLORIDE 200 MG: 200 TABLET ORAL at 09:08

## 2024-08-29 RX ADMIN — HYDRALAZINE HYDROCHLORIDE 10 MG: 20 INJECTION INTRAMUSCULAR; INTRAVENOUS at 04:08

## 2024-08-29 NOTE — PROGRESS NOTES
Infectious Disease  Progress Note    Patient Name: Eliseo Denson   MRN: [id number]   Admission Date: 8/27/2024   Hospital Length of Stay: 2 days  Attending Physician: Gonzales Alonso MD   Primary Care Provider: Simin, Primary Doctor     Isolation Status: No active isolations     Assessment/Plan:        54 year old male patient with past medical history significant for HTN, HLD, CAD s/p PCI, ICMO, HFrEF (EF 30%), s/p Dual Chamber ICD 7/26/2024 presented to [facility] with SOB on 08/23/2024 and found to have decompensated CHF. He was also febrile and had positive blood cultures for MSSA and tenderness over the ICD pocket as well as discharge from the site. He was transferred to [facility] for evaluation with TATY and CV surgery consideration of ICD removal given this was likely source of bacteremia. Of note, he was recently admitted 1 month prior with ICD placement in the setting of VT (VF) s/p Defibrillation at OSH. ID consulted for assistance in management        MSSA bacteremia  ICD infection  Sepsis 2/2 above  Hx of VF s/p Defibrillation s/p ICD Placement 7/26  ICMO  HFrEF 20%  CAD with prior stenting to LAD and D3  HTN  HLD        Recommendations:  -Continue Oxacillin 2g IV q4h  -Repeat Blood cultures 08/27 with no growth  -TATY with no clear vegetation on the wires or valves  -No evidence of seeding on physical exam  -Plan for removal of the ICD in am with CV surgery    -Discussed with patient and significant other at bedside     Thank you for your consult. ID will continue following. Please contact us with any questions or concerns.     Antibiotics History:  Vancomycin 08/23 - 08/25  Oxacillin 08/25 - Present     Portions of this note dictated using EMR integrated voice recognition software, and may be subject to voice recognition errors not corrected at proofreading. Please contact writer for clarification if needed.    Subjective:     Principal Problem: MSSA bacteremia     Interval History:   No fevers, no chills,  pain intermittent at site of the ICD    Review of Systems   ROS     Objective:     Vital Signs (Most Recent):  Temp: 98.9 °F (37.2 °C) (08/29/24 1515)  Pulse: 63 (08/29/24 1515)  Resp: 20 (08/29/24 1515)  BP: (!) 171/94 (08/29/24 1515)  SpO2: 98 % (08/29/24 1515)  Vital Signs (24h Range):  Temp:  [98.3 °F (36.8 °C)-99.1 °F (37.3 °C)] 98.9 °F (37.2 °C)  Pulse:  [62-71] 63  Resp:  [18-20] 20  SpO2:  [90 %-98 %] 98 %  BP: (125-171)/(88-95) 171/94      Weight:   Wt Readings from Last 1 Encounters:   08/27/24 105 kg (231 lb 8 oz)      Body mass index is Body mass index is 32.29 kg/m².     Estimated Creatinine Clearance: Estimated Creatinine Clearance: 99.2 mL/min (based on SCr of 1.05 mg/dL).     Lines/Drains/Airways       Peripheral Intravenous Line  Duration                  Peripheral IV - Single Lumen 08/28/24 1900 20 G Proximal;Right Forearm <1 day                     Physical Exam  Physical Exam  Constitutional:       Appearance: Normal appearance.   HENT:      Head: Normocephalic and atraumatic.      Mouth/Throat:      Pharynx: No oropharyngeal exudate or posterior oropharyngeal erythema.   Eyes:      Extraocular Movements: Extraocular movements intact.      Pupils: Pupils are equal, round, and reactive to light.   Cardiovascular:      Rate and Rhythm: Normal rate and regular rhythm.      Heart sounds: No murmur heard.     Comments: Site of ICD with some induration, small ulceration, no drainage  Pulmonary:      Effort: No respiratory distress.      Breath sounds: No wheezing, rhonchi or rales.   Abdominal:      General: Bowel sounds are normal. There is no distension.      Palpations: Abdomen is soft.      Tenderness: There is no abdominal tenderness. There is no right CVA tenderness or left CVA tenderness.   Musculoskeletal:         General: No swelling or tenderness.      Cervical back: Neck supple. No rigidity or tenderness.   Lymphadenopathy:      Cervical: No cervical adenopathy.   Skin:     Findings: No  lesion or rash.   Neurological:      General: No focal deficit present.      Mental Status: He is alert and oriented to person, place, and time. Mental status is at baseline.      Cranial Nerves: No cranial nerve deficit.      Motor: No weakness.   Psychiatric:         Mood and Affect: Mood normal.         Behavior: Behavior normal.          Significant Labs: CBC:   Recent Labs   Lab 08/28/24  0501 08/29/24  0625   WBC 5.72 7.16   HGB 14.8 14.3   HCT 44.6 43.3    345     CMP:   Recent Labs   Lab 08/28/24  0501 08/29/24  0625    137   K 4.3 4.2    103   CO2 27 27   BUN 16.0 9.3   CREATININE 1.23* 1.05   CALCIUM 8.8 8.9   ALBUMIN 2.8* 2.6*   BILITOT 0.2 0.4   ALKPHOS 107 103   AST 26 33   ALT 29 37       Microbiology Results (last 7 days)       ** No results found for the last 168 hours. **             Significant Imaging: I have reviewed all pertinent imaging results/findings within the past 24 hours.      Hiren Berkowitz MD  Infectious Disease  [facility]

## 2024-08-29 NOTE — CONSULTS
[facility]   Consult Note  Cardiothoracic Surgery    SUBJECTIVE:     Chief Complaint/Reason for Admission: shortness of breath    History of Present Illness:  Patient is a 54 y.o. male presents with shortness of breath, s/p pci and dual chamber aicd 7/25/24.      Family History of Heart Disease: yes    No current facility-administered medications on file prior to encounter.     Current Outpatient Medications on File Prior to Encounter   Medication Sig Dispense Refill    amiodarone (PACERONE) 200 MG Tab Take 200 mg by mouth every morning.      amLODIPine (NORVASC) 10 MG tablet Take 10 mg by mouth once daily.      JAUN CHEWABLE ASPIRIN 81 mg Chew Take 81 mg by mouth.      rosuvastatin (CRESTOR) 10 MG tablet Take 10 mg by mouth once daily.      sacubitriL-valsartan (ENTRESTO) 24-26 mg per tablet Take 1 tablet by mouth 2 (two) times daily. 30 tablet 3        Review of patient's allergies indicates:  No Known Allergies     No past medical history on file.     No past surgical history on file.        Review of Systems:  Review of Systems   Respiratory:  Positive for shortness of breath.    All other systems reviewed and are negative.       OBJECTIVE:        Physical Exam:  Physical Exam  Vitals reviewed.   HENT:      Head: Normocephalic.      Right Ear: Tympanic membrane normal.      Left Ear: Tympanic membrane normal.      Nose: Nose normal.      Mouth/Throat:      Mouth: Mucous membranes are moist.   Eyes:      Pupils: Pupils are equal, round, and reactive to light.   Cardiovascular:      Rate and Rhythm: Normal rate and regular rhythm.      Pulses: Normal pulses.   Pulmonary:      Effort: Pulmonary effort is normal.      Breath sounds: Normal breath sounds.   Abdominal:      Palpations: Abdomen is soft.   Musculoskeletal:         General: Normal range of motion.      Cervical back: Normal range of motion.   Skin:     General: Skin is warm.   Neurological:      General: No focal deficit present.       Mental Status: He is alert.   Psychiatric:         Mood and Affect: Mood normal.          Laboratory:  I have reviewed all pertinent lab results within the past 24 hours.    Diagnostic Results:  Echo: Reviewed          ASSESSMENT/PLAN:     A: Bacteremia   P: aicd removal tomorrow

## 2024-08-29 NOTE — PROGRESS NOTES
[facility]  Hospital Medicine Progress Note        Chief Complaint: Inpatient Follow-up for bacteremia    HPI:   54-year-old male with medical history of CAD/stent 2008, NSVT on amiodarone, ischemic cardiomyopathy, HTN, HLD, cardiologist Dr Arnulfo Bain who recently suffered NSTEMI and unstable VT on 7/24/2024 initially seen at [facility] ED and resuscitated/defibrillated and subsequently transferred to [facility], underwent LHC7/25/2024 by Dr. Hampton showing obstructive CAD that did not require intervention and underwent dual-chamber ICD (Biotronik) on 07/26/2024 by Dr Hussein and subsequently discharged home the next day.     Presented to [facility] ED on 08/23/2024 with complaint of shortness breath and sharp chest pain along the ICD site as well as worsening s dyspnea on exertion and orthopnea, night sweats and generalized fatigue.  His workup revealed MSSA bacteremia, transthoracic echo show LVEF 28% no evidence of vegetations on aortic or mitral valve, tricuspid and pulmonic valve not well visualized.  CTA chest show no evidence of PE, patchy bilateral ground-glass opacities suspicious for infection or pulmonary edema.  He was evaluated by Internal Medicine, Cardiology and Infectious Disease, transitioned to antibiotic with oxacillin per sensitivities.  Also received few days of IV diuresis and shortness breath has improved  Transferred to [facility] today 08/27/2024 for cardiology/TATY evaluation.     Upon arrival he was afebrile and hemodynamically stable saturating well on room air, in sinus rhythm, report intermittent sharp chest pain.    Interval Hx:   Afebrile, alert, oriented, comfortably resting in the bed.  Wife at bedside.  He is doing well on room air.  Denies any worsening of shortness a breath, fever, night sweats.  Labs this morning showing nothing newly remarkable.  Awaiting AICD removal       Objective/physical exam:  Vitals:    08/28/24 2057 08/28/24 2334 08/29/24 0031 08/29/24 0404   BP:  135/88  (!) 125/90 (!) 153/90   BP Location:       Patient Position:       Pulse:   63 65   Resp:  20     Temp:   98.4 °F (36.9 °C) 98.3 °F (36.8 °C)   TempSrc:   Oral Oral   SpO2:   (!) 94% (!) 90%   Weight:         General: In no acute distress, afebrile  Respiratory: Clear to auscultation bilaterally  Cardiovascular: S1, S2, no appreciable murmur  Abdomen: Soft, nontender, BS +  MSK: Warm, no lower extremity edema, no clubbing or cyanosis  Neurologic: Alert and oriented x4, moving all extremities with good strength     Lab Results   Component Value Date     08/29/2024    K 4.2 08/29/2024     08/29/2024    CO2 27 08/29/2024    BUN 9.3 08/29/2024    CREATININE 1.05 08/29/2024    CALCIUM 8.9 08/29/2024    ANIONGAP 8 07/27/2024    ESTGFRAFRICA 65 07/27/2024      Lab Results   Component Value Date    ALT 37 08/29/2024    AST 33 08/29/2024    ALKPHOS 103 08/29/2024    BILITOT 0.4 08/29/2024      Lab Results   Component Value Date    WBC 7.16 08/29/2024    HGB 14.3 08/29/2024    HCT 43.3 08/29/2024    MCV 91.0 08/29/2024     08/29/2024           Medications:   amiodarone  200 mg Oral Daily    atorvastatin  80 mg Oral Daily    carvediloL  6.25 mg Oral BID    enoxparin  40 mg Subcutaneous Q24H (prophylaxis, 1700)    oxacillin IV (PEDS and ADULTS)  2 g Intravenous Q4H    sacubitriL-valsartan  1 tablet Oral BID        Current Facility-Administered Medications:     acetaminophen, 1,000 mg, Oral, Q6H PRN    aluminum-magnesium hydroxide-simethicone, 30 mL, Oral, QID PRN    bisacodyL, 10 mg, Rectal, Daily PRN    dextromethorphan-guaiFENesin  mg/5 ml, 10 mL, Oral, Q6H PRN    hydrALAZINE, 10 mg, Intravenous, Q4H PRN    melatonin, 6 mg, Oral, Nightly PRN    ondansetron, 4 mg, Intravenous, Q4H PRN    prochlorperazine, 5 mg, Intravenous, Q6H PRN    senna-docusate 8.6-50 mg, 1 tablet, Oral, BID PRN    sodium chloride 0.9%, 10 mL, Intravenous, PRN    traMADoL, 50 mg, Oral, Q6H PRN      Assessment/Plan:    Sepsis secondary to MSSA bacteremia  ICD infection    HX:  Ischemic cardiomyopathy, HFrEF 20%, CAD s/p PCI, history of VFib s/p defibrillation and ICD placement 07/26/2024, HTN, HLD    Plan:   -lolita showed no vegetations.  Id suggested removal of AICD .  Continue oxacillin 2g IV q.4.  Blood cultures from 08/20 7- thus far  -CT surgery following.  Patient is scheduled for AICD removal tomorrow.  -other home medications were reviewed and renewed.  Continue amiodarone, statin, Coreg, Entresto as part of GDM T.  Cis suggested holding Plavix while patient is awaiting intervention.  We will resume once cleared from surgery standpoint    Soumya Alonso MD

## 2024-08-29 NOTE — CARE UPDATE
Patient seen and examined, chart reviewed, full note to follow.     -Continue Oxacillin  -Consult Cardiovascular surgery for evaluation fo ICD removal   -Monitor blood cultures  -final antibiotics recommendations accordingly    Hiren Berkowitz MD  Infectious Disease  [facility]

## 2024-08-29 NOTE — NURSING
Nurses Note -- 4 Eyes      8/29/2024   10:51 AM      Skin assessed during: Q Shift Change      [x] No Altered Skin Integrity Present    []Prevention Measures Documented      [] Yes- Altered Skin Integrity Present or Discovered   [] LDA Added if Not in Epic (Describe Wound)   [] New Altered Skin Integrity was Present on Admit and Documented in LDA   [] Wound Image Taken    Wound Care Consulted? No    Attending Nurse:  Sarahi Jimenez RN/Staff Member:  Darrell

## 2024-08-30 ENCOUNTER — ANESTHESIA EVENT (OUTPATIENT)
Dept: SURGERY | Facility: HOSPITAL | Age: 54
End: 2024-08-30

## 2024-08-30 ENCOUNTER — ANESTHESIA (OUTPATIENT)
Dept: SURGERY | Facility: HOSPITAL | Age: 54
End: 2024-08-30

## 2024-08-30 LAB
ALBUMIN SERPL-MCNC: 2.9 G/DL (ref 3.5–5)
ALBUMIN/GLOB SERPL: 0.8 RATIO (ref 1.1–2)
ALP SERPL-CCNC: 101 UNIT/L (ref 40–150)
ALT SERPL-CCNC: 41 UNIT/L (ref 0–55)
ANION GAP SERPL CALC-SCNC: 10 MEQ/L
AST SERPL-CCNC: 32 UNIT/L (ref 5–34)
BASOPHILS # BLD AUTO: 0.03 X10(3)/MCL
BASOPHILS NFR BLD AUTO: 0.4 %
BILIRUB SERPL-MCNC: 0.4 MG/DL
BUN SERPL-MCNC: 10 MG/DL (ref 8.4–25.7)
CALCIUM SERPL-MCNC: 9.1 MG/DL (ref 8.4–10.2)
CHLORIDE SERPL-SCNC: 102 MMOL/L (ref 98–107)
CO2 SERPL-SCNC: 23 MMOL/L (ref 22–29)
CREAT SERPL-MCNC: 1.01 MG/DL (ref 0.73–1.18)
CREAT/UREA NIT SERPL: 10
EOSINOPHIL # BLD AUTO: 0.19 X10(3)/MCL (ref 0–0.9)
EOSINOPHIL NFR BLD AUTO: 2.4 %
ERYTHROCYTE [DISTWIDTH] IN BLOOD BY AUTOMATED COUNT: 13.2 % (ref 11.5–17)
GFR SERPLBLD CREATININE-BSD FMLA CKD-EPI: >60 ML/MIN/1.73/M2
GLOBULIN SER-MCNC: 3.6 GM/DL (ref 2.4–3.5)
GLUCOSE SERPL-MCNC: 108 MG/DL (ref 74–100)
GRAM STN SPEC: NORMAL
GRAM STN SPEC: NORMAL
HCT VFR BLD AUTO: 45.3 % (ref 42–52)
HGB BLD-MCNC: 15.2 G/DL (ref 14–18)
IMM GRANULOCYTES # BLD AUTO: 0.03 X10(3)/MCL (ref 0–0.04)
IMM GRANULOCYTES NFR BLD AUTO: 0.4 %
LYMPHOCYTES # BLD AUTO: 1.26 X10(3)/MCL (ref 0.6–4.6)
LYMPHOCYTES NFR BLD AUTO: 15.7 %
MAGNESIUM SERPL-MCNC: 1.9 MG/DL (ref 1.6–2.6)
MCH RBC QN AUTO: 30.8 PG (ref 27–31)
MCHC RBC AUTO-ENTMCNC: 33.6 G/DL (ref 33–36)
MCV RBC AUTO: 91.7 FL (ref 80–94)
MONOCYTES # BLD AUTO: 0.64 X10(3)/MCL (ref 0.1–1.3)
MONOCYTES NFR BLD AUTO: 8 %
NEUTROPHILS # BLD AUTO: 5.89 X10(3)/MCL (ref 2.1–9.2)
NEUTROPHILS NFR BLD AUTO: 73.1 %
NRBC BLD AUTO-RTO: 0 %
PLATELET # BLD AUTO: 422 X10(3)/MCL (ref 130–400)
PMV BLD AUTO: 9.4 FL (ref 7.4–10.4)
POTASSIUM SERPL-SCNC: 4.8 MMOL/L (ref 3.5–5.1)
PROT SERPL-MCNC: 6.5 GM/DL (ref 6.4–8.3)
RBC # BLD AUTO: 4.94 X10(6)/MCL (ref 4.7–6.1)
SODIUM SERPL-SCNC: 135 MMOL/L (ref 136–145)
WBC # BLD AUTO: 8.04 X10(3)/MCL (ref 4.5–11.5)

## 2024-08-30 PROCEDURE — 25000003 PHARM REV CODE 250: Performed by: NURSE ANESTHETIST, CERTIFIED REGISTERED

## 2024-08-30 PROCEDURE — 37000009 HC ANESTHESIA EA ADD 15 MINS: Performed by: STUDENT IN AN ORGANIZED HEALTH CARE EDUCATION/TRAINING PROGRAM

## 2024-08-30 PROCEDURE — 11000001 HC ACUTE MED/SURG PRIVATE ROOM

## 2024-08-30 PROCEDURE — 71000033 HC RECOVERY, INTIAL HOUR: Performed by: STUDENT IN AN ORGANIZED HEALTH CARE EDUCATION/TRAINING PROGRAM

## 2024-08-30 PROCEDURE — 27201423 OPTIME MED/SURG SUP & DEVICES STERILE SUPPLY: Performed by: STUDENT IN AN ORGANIZED HEALTH CARE EDUCATION/TRAINING PROGRAM

## 2024-08-30 PROCEDURE — 36620 INSERTION CATHETER ARTERY: CPT | Performed by: NURSE ANESTHETIST, CERTIFIED REGISTERED

## 2024-08-30 PROCEDURE — 37000008 HC ANESTHESIA 1ST 15 MINUTES: Performed by: STUDENT IN AN ORGANIZED HEALTH CARE EDUCATION/TRAINING PROGRAM

## 2024-08-30 PROCEDURE — 83735 ASSAY OF MAGNESIUM: CPT | Performed by: INTERNAL MEDICINE

## 2024-08-30 PROCEDURE — 25000003 PHARM REV CODE 250: Performed by: INTERNAL MEDICINE

## 2024-08-30 PROCEDURE — 63600175 PHARM REV CODE 636 W HCPCS: Performed by: STUDENT IN AN ORGANIZED HEALTH CARE EDUCATION/TRAINING PROGRAM

## 2024-08-30 PROCEDURE — 33244 REMOVE ELCTRD TRANSVENOUSLY: CPT | Mod: ,,, | Performed by: STUDENT IN AN ORGANIZED HEALTH CARE EDUCATION/TRAINING PROGRAM

## 2024-08-30 PROCEDURE — 63600175 PHARM REV CODE 636 W HCPCS: Performed by: INTERNAL MEDICINE

## 2024-08-30 PROCEDURE — 25000003 PHARM REV CODE 250: Performed by: NURSE PRACTITIONER

## 2024-08-30 PROCEDURE — 87077 CULTURE AEROBIC IDENTIFY: CPT | Performed by: STUDENT IN AN ORGANIZED HEALTH CARE EDUCATION/TRAINING PROGRAM

## 2024-08-30 PROCEDURE — 85025 COMPLETE CBC W/AUTO DIFF WBC: CPT | Performed by: INTERNAL MEDICINE

## 2024-08-30 PROCEDURE — 94760 N-INVAS EAR/PLS OXIMETRY 1: CPT

## 2024-08-30 PROCEDURE — 63600175 PHARM REV CODE 636 W HCPCS: Performed by: PHYSICIAN ASSISTANT

## 2024-08-30 PROCEDURE — 33241 REMOVE PULSE GENERATOR: CPT | Mod: ,,, | Performed by: STUDENT IN AN ORGANIZED HEALTH CARE EDUCATION/TRAINING PROGRAM

## 2024-08-30 PROCEDURE — 99024 POSTOP FOLLOW-UP VISIT: CPT | Mod: ,,, | Performed by: PHYSICIAN ASSISTANT

## 2024-08-30 PROCEDURE — 87205 SMEAR GRAM STAIN: CPT | Performed by: STUDENT IN AN ORGANIZED HEALTH CARE EDUCATION/TRAINING PROGRAM

## 2024-08-30 PROCEDURE — C1894 INTRO/SHEATH, NON-LASER: HCPCS | Performed by: STUDENT IN AN ORGANIZED HEALTH CARE EDUCATION/TRAINING PROGRAM

## 2024-08-30 PROCEDURE — 36000707: Performed by: STUDENT IN AN ORGANIZED HEALTH CARE EDUCATION/TRAINING PROGRAM

## 2024-08-30 PROCEDURE — 0JPT0PZ REMOVAL OF CARDIAC RHYTHM RELATED DEVICE FROM TRUNK SUBCUTANEOUS TISSUE AND FASCIA, OPEN APPROACH: ICD-10-PCS | Performed by: STUDENT IN AN ORGANIZED HEALTH CARE EDUCATION/TRAINING PROGRAM

## 2024-08-30 PROCEDURE — 36415 COLL VENOUS BLD VENIPUNCTURE: CPT | Performed by: INTERNAL MEDICINE

## 2024-08-30 PROCEDURE — 87075 CULTR BACTERIA EXCEPT BLOOD: CPT | Performed by: STUDENT IN AN ORGANIZED HEALTH CARE EDUCATION/TRAINING PROGRAM

## 2024-08-30 PROCEDURE — 80053 COMPREHEN METABOLIC PANEL: CPT | Performed by: INTERNAL MEDICINE

## 2024-08-30 PROCEDURE — 36000706: Performed by: STUDENT IN AN ORGANIZED HEALTH CARE EDUCATION/TRAINING PROGRAM

## 2024-08-30 PROCEDURE — 36620 INSERTION CATHETER ARTERY: CPT | Mod: 59,,, | Performed by: NURSE ANESTHETIST, CERTIFIED REGISTERED

## 2024-08-30 PROCEDURE — 63600175 PHARM REV CODE 636 W HCPCS: Performed by: NURSE ANESTHETIST, CERTIFIED REGISTERED

## 2024-08-30 PROCEDURE — 63600175 PHARM REV CODE 636 W HCPCS: Performed by: NURSE PRACTITIONER

## 2024-08-30 PROCEDURE — 87070 CULTURE OTHR SPECIMN AEROBIC: CPT | Performed by: STUDENT IN AN ORGANIZED HEALTH CARE EDUCATION/TRAINING PROGRAM

## 2024-08-30 PROCEDURE — 27000221 HC OXYGEN, UP TO 24 HOURS

## 2024-08-30 RX ORDER — ACETAMINOPHEN 325 MG/1
650 TABLET ORAL EVERY 4 HOURS PRN
Status: CANCELLED | OUTPATIENT
Start: 2024-08-30

## 2024-08-30 RX ORDER — SODIUM CHLORIDE 450 MG/100ML
INJECTION, SOLUTION INTRAVENOUS CONTINUOUS
Status: CANCELLED | OUTPATIENT
Start: 2024-08-30 | End: 2024-08-31

## 2024-08-30 RX ORDER — MUPIROCIN 20 MG/G
1 OINTMENT TOPICAL 2 TIMES DAILY
Status: CANCELLED | OUTPATIENT
Start: 2024-08-30 | End: 2024-09-01

## 2024-08-30 RX ORDER — LOPERAMIDE HYDROCHLORIDE 2 MG/1
4 CAPSULE ORAL ONCE
Status: CANCELLED | OUTPATIENT
Start: 2024-08-30 | End: 2024-08-30

## 2024-08-30 RX ORDER — FENTANYL CITRATE 50 UG/ML
INJECTION, SOLUTION INTRAMUSCULAR; INTRAVENOUS
Status: DISCONTINUED | OUTPATIENT
Start: 2024-08-30 | End: 2024-08-30

## 2024-08-30 RX ORDER — AMLODIPINE BESYLATE 5 MG/1
5 TABLET ORAL DAILY
Status: DISCONTINUED | OUTPATIENT
Start: 2024-08-30 | End: 2024-09-01

## 2024-08-30 RX ORDER — LIDOCAINE HYDROCHLORIDE 20 MG/ML
INJECTION, SOLUTION EPIDURAL; INFILTRATION; INTRACAUDAL; PERINEURAL
Status: DISCONTINUED | OUTPATIENT
Start: 2024-08-30 | End: 2024-08-30

## 2024-08-30 RX ORDER — MUPIROCIN 20 MG/G
OINTMENT TOPICAL
Status: DISCONTINUED | OUTPATIENT
Start: 2024-08-30 | End: 2024-08-30 | Stop reason: HOSPADM

## 2024-08-30 RX ORDER — ROCURONIUM BROMIDE 10 MG/ML
INJECTION, SOLUTION INTRAVENOUS
Status: DISCONTINUED | OUTPATIENT
Start: 2024-08-30 | End: 2024-08-30

## 2024-08-30 RX ORDER — MIDAZOLAM HYDROCHLORIDE 1 MG/ML
INJECTION INTRAMUSCULAR; INTRAVENOUS
Status: DISCONTINUED | OUTPATIENT
Start: 2024-08-30 | End: 2024-08-30

## 2024-08-30 RX ORDER — CEFAZOLIN SODIUM 2 G/50ML
2 SOLUTION INTRAVENOUS
Status: CANCELLED | OUTPATIENT
Start: 2024-08-30 | End: 2024-08-31

## 2024-08-30 RX ORDER — ONDANSETRON 4 MG/1
8 TABLET, ORALLY DISINTEGRATING ORAL EVERY 8 HOURS PRN
Status: CANCELLED | OUTPATIENT
Start: 2024-08-30

## 2024-08-30 RX ORDER — KETOROLAC TROMETHAMINE 30 MG/ML
15 INJECTION, SOLUTION INTRAMUSCULAR; INTRAVENOUS 4 TIMES DAILY
Status: CANCELLED | OUTPATIENT
Start: 2024-08-30 | End: 2024-09-01

## 2024-08-30 RX ORDER — DEXAMETHASONE SODIUM PHOSPHATE 4 MG/ML
INJECTION, SOLUTION INTRA-ARTICULAR; INTRALESIONAL; INTRAMUSCULAR; INTRAVENOUS; SOFT TISSUE
Status: DISCONTINUED | OUTPATIENT
Start: 2024-08-30 | End: 2024-08-30

## 2024-08-30 RX ORDER — GLUCAGON 1 MG
1 KIT INJECTION
Status: DISCONTINUED | OUTPATIENT
Start: 2024-08-30 | End: 2024-08-30 | Stop reason: HOSPADM

## 2024-08-30 RX ORDER — TALC
6 POWDER (GRAM) TOPICAL NIGHTLY PRN
Status: DISCONTINUED | OUTPATIENT
Start: 2024-08-30 | End: 2024-09-10 | Stop reason: HOSPADM

## 2024-08-30 RX ORDER — TRAMADOL HYDROCHLORIDE 50 MG/1
50 TABLET ORAL EVERY 6 HOURS PRN
Status: DISCONTINUED | OUTPATIENT
Start: 2024-08-30 | End: 2024-09-10 | Stop reason: HOSPADM

## 2024-08-30 RX ORDER — VANCOMYCIN HCL IN 5 % DEXTROSE 1G/250ML
1000 PLASTIC BAG, INJECTION (ML) INTRAVENOUS
Status: CANCELLED | OUTPATIENT
Start: 2024-08-30 | End: 2024-09-01

## 2024-08-30 RX ORDER — HYDROMORPHONE HYDROCHLORIDE 2 MG/ML
0.4 INJECTION, SOLUTION INTRAMUSCULAR; INTRAVENOUS; SUBCUTANEOUS EVERY 5 MIN PRN
Status: DISCONTINUED | OUTPATIENT
Start: 2024-08-30 | End: 2024-08-30 | Stop reason: HOSPADM

## 2024-08-30 RX ORDER — CEFAZOLIN SODIUM 2 G/50ML
2 SOLUTION INTRAVENOUS
Status: COMPLETED | OUTPATIENT
Start: 2024-08-30 | End: 2024-08-30

## 2024-08-30 RX ORDER — METOCLOPRAMIDE HYDROCHLORIDE 5 MG/ML
5 INJECTION INTRAMUSCULAR; INTRAVENOUS EVERY 6 HOURS PRN
Status: CANCELLED | OUTPATIENT
Start: 2024-08-30

## 2024-08-30 RX ORDER — HEPARIN SODIUM 1000 [USP'U]/ML
INJECTION, SOLUTION INTRAVENOUS; SUBCUTANEOUS
Status: DISCONTINUED | OUTPATIENT
Start: 2024-08-30 | End: 2024-08-30 | Stop reason: HOSPADM

## 2024-08-30 RX ORDER — ETOMIDATE 2 MG/ML
INJECTION INTRAVENOUS
Status: DISCONTINUED | OUTPATIENT
Start: 2024-08-30 | End: 2024-08-30

## 2024-08-30 RX ORDER — FAMOTIDINE 20 MG/1
20 TABLET, FILM COATED ORAL 2 TIMES DAILY
Status: CANCELLED | OUTPATIENT
Start: 2024-08-30

## 2024-08-30 RX ORDER — SODIUM CHLORIDE 0.9 % (FLUSH) 0.9 %
10 SYRINGE (ML) INJECTION
Status: DISCONTINUED | OUTPATIENT
Start: 2024-08-30 | End: 2024-08-30 | Stop reason: HOSPADM

## 2024-08-30 RX ORDER — ONDANSETRON HYDROCHLORIDE 2 MG/ML
INJECTION, SOLUTION INTRAVENOUS
Status: DISCONTINUED | OUTPATIENT
Start: 2024-08-30 | End: 2024-08-30

## 2024-08-30 RX ADMIN — LIDOCAINE HYDROCHLORIDE 80 ML: 20 INJECTION, SOLUTION INTRAVENOUS at 10:08

## 2024-08-30 RX ADMIN — CEFAZOLIN SODIUM 2 G: 2 SOLUTION INTRAVENOUS at 11:08

## 2024-08-30 RX ADMIN — CARVEDILOL 6.25 MG: 3.12 TABLET, FILM COATED ORAL at 08:08

## 2024-08-30 RX ADMIN — ROCURONIUM BROMIDE 60 MG: 10 SOLUTION INTRAVENOUS at 10:08

## 2024-08-30 RX ADMIN — ENOXAPARIN SODIUM 40 MG: 40 INJECTION SUBCUTANEOUS at 06:08

## 2024-08-30 RX ADMIN — ONDANSETRON 4 MG: 2 INJECTION INTRAMUSCULAR; INTRAVENOUS at 11:08

## 2024-08-30 RX ADMIN — OXACILLIN 2 G: 2 INJECTION, POWDER, FOR SOLUTION INTRAMUSCULAR; INTRAVENOUS at 12:08

## 2024-08-30 RX ADMIN — SUGAMMADEX 200 MG: 100 INJECTION, SOLUTION INTRAVENOUS at 11:08

## 2024-08-30 RX ADMIN — TRAMADOL HYDROCHLORIDE 50 MG: 50 TABLET, COATED ORAL at 07:08

## 2024-08-30 RX ADMIN — OXACILLIN 2 G: 2 INJECTION, POWDER, FOR SOLUTION INTRAMUSCULAR; INTRAVENOUS at 03:08

## 2024-08-30 RX ADMIN — OXACILLIN 2 G: 2 INJECTION, POWDER, FOR SOLUTION INTRAMUSCULAR; INTRAVENOUS at 01:08

## 2024-08-30 RX ADMIN — OXACILLIN 2 G: 2 INJECTION, POWDER, FOR SOLUTION INTRAMUSCULAR; INTRAVENOUS at 08:08

## 2024-08-30 RX ADMIN — ETOMIDATE 20 MG: 2 INJECTION INTRAVENOUS at 10:08

## 2024-08-30 RX ADMIN — OXACILLIN 2 G: 2 INJECTION, POWDER, FOR SOLUTION INTRAMUSCULAR; INTRAVENOUS at 09:08

## 2024-08-30 RX ADMIN — AMLODIPINE BESYLATE 5 MG: 5 TABLET ORAL at 09:08

## 2024-08-30 RX ADMIN — ROCURONIUM BROMIDE 10 MG: 10 SOLUTION INTRAVENOUS at 11:08

## 2024-08-30 RX ADMIN — DEXAMETHASONE SODIUM PHOSPHATE 4 MG: 4 INJECTION, SOLUTION INTRA-ARTICULAR; INTRALESIONAL; INTRAMUSCULAR; INTRAVENOUS; SOFT TISSUE at 11:08

## 2024-08-30 RX ADMIN — FENTANYL CITRATE 50 MCG: 50 INJECTION, SOLUTION INTRAMUSCULAR; INTRAVENOUS at 10:08

## 2024-08-30 RX ADMIN — ATORVASTATIN CALCIUM 80 MG: 40 TABLET, FILM COATED ORAL at 09:08

## 2024-08-30 RX ADMIN — SACUBITRIL AND VALSARTAN 1 TABLET: 24; 26 TABLET, FILM COATED ORAL at 09:08

## 2024-08-30 RX ADMIN — MIDAZOLAM HYDROCHLORIDE 2 MG: 1 INJECTION, SOLUTION INTRAMUSCULAR; INTRAVENOUS at 10:08

## 2024-08-30 RX ADMIN — SACUBITRIL AND VALSARTAN 1 TABLET: 24; 26 TABLET, FILM COATED ORAL at 08:08

## 2024-08-30 RX ADMIN — TRAMADOL HYDROCHLORIDE 50 MG: 50 TABLET, COATED ORAL at 08:08

## 2024-08-30 RX ADMIN — CARVEDILOL 6.25 MG: 3.12 TABLET, FILM COATED ORAL at 09:08

## 2024-08-30 RX ADMIN — SODIUM CHLORIDE, SODIUM GLUCONATE, SODIUM ACETATE, POTASSIUM CHLORIDE AND MAGNESIUM CHLORIDE: 526; 502; 368; 37; 30 INJECTION, SOLUTION INTRAVENOUS at 10:08

## 2024-08-30 RX ADMIN — AMIODARONE HYDROCHLORIDE 200 MG: 200 TABLET ORAL at 09:08

## 2024-08-30 RX ADMIN — TRAMADOL HYDROCHLORIDE 50 MG: 50 TABLET, COATED ORAL at 01:08

## 2024-08-30 RX ADMIN — OXACILLIN 2 G: 2 INJECTION, POWDER, FOR SOLUTION INTRAMUSCULAR; INTRAVENOUS at 06:08

## 2024-08-30 NOTE — ANESTHESIA PROCEDURE NOTES
Intubation    Date/Time: 8/30/2024 10:53 AM    Performed by: Jovany Daniels CRNA  Authorized by: Diane Espinosa MD    Intubation:     Induction:  Intravenous    Intubated:  Postinduction    Mask Ventilation:  Easy mask    Attempts:  1    Attempted By:  CRNA    Method of Intubation:  Direct    Blade:  Khan 2    Laryngeal View Grade: Grade IIA - cords partially seen      Difficult Airway Encountered?: No      Complications:  None    Airway Device:  Oral endotracheal tube    Airway Device Size:  8.0    Style/Cuff Inflation:  Cuffed (inflated to minimal occlusive pressure)    Inflation Amount (mL):  6    Tube secured:  21    Secured at:  The lips    Placement Verified By:  Capnometry    Complicating Factors:  None    Findings Post-Intubation:  BS equal bilateral

## 2024-08-30 NOTE — TRANSFER OF CARE
Anesthesia Transfer of Care Note    Patient: Eliseo Denson    Procedure(s) Performed: Procedure(s) (LRB):  EXTRACTION, ELECTRODE LEAD (N/A)    Patient location: PACU    Anesthesia Type: general    Transport from OR: Transported from OR on room air with adequate spontaneous ventilation    Post pain: adequate analgesia    Post assessment: no apparent anesthetic complications    Post vital signs: stable    Level of consciousness: responds to stimulation    Nausea/Vomiting: no nausea/vomiting    Complications: none    Transfer of care protocol was followed      Last vitals: Visit Vitals  BP (!) 154/100   Pulse 72   Temp 37.5 °C (99.5 °F)   Resp 19   Wt 104 kg (229 lb 4.5 oz)   SpO2 96%   BMI 31.98 kg/m²

## 2024-08-30 NOTE — ANESTHESIA PROCEDURE NOTES
Monitor TATY    Diagnosis: Infected defibrillator and pacemaker leads  Patient location during procedure: OR    Staffing  Authorizing Provider: Diane Espinosa MD  Performing Provider: Diane Espinosa MD    Staffing  Anesthesiologist Present  Yes  Setup & Induction  Patient preparation: bite block inserted  Probe Insertion: easy              This examination was performed solely for the purpose of ensuring adequate removal of pacer and defibrillator leads intracardiac.    Preoperative examination was limited.  Right ventricular and left ventricular hypokinesis noted.  EF is approximately 25-30%.  There is mild tricuspid and mild mitral regurgitation.  There is no preoperative effusion noted.      Right atrial and ventricular leads are noted preoperatively.    Leads were removed and a 2nd examination was performed.      No changes in the overall right or left ventricular function is noted.  There is no postoperative effusion.      The transesophageal echo probe was removed atraumatically at the conclusion of the operation.

## 2024-08-30 NOTE — ANESTHESIA PROCEDURE NOTES
Arterial    Diagnosis: CHF    Patient location during procedure: holding area  Timeout: 8/30/2024 10:00 AM  Procedure end time: 8/30/2024 10:05 AM    Staffing  Authorizing Provider: Diane Espinosa MD  Performing Provider: Jovany Daniels CRNA    Staffing  Performed by: Jovany Daniels CRNA  Authorized by: Diane Espinosa MD    Anesthesiologist was present at the time of the procedure.    Preanesthetic Checklist  Completed: patient identified, IV checked, site marked, risks and benefits discussed, surgical consent, monitors and equipment checked, pre-op evaluation, timeout performed and anesthesia consent givenArterial  Skin Prep: chlorhexidine gluconate  Local Infiltration: lidocaine  Orientation: left  Location: radial    Catheter Size: 20 G Insertion Attempts: 1  Assessment  Dressing: secured with tape and tegaderm  Patient: Tolerated well

## 2024-08-30 NOTE — NURSING
Nurses Note -- 4 Eyes      8/30/2024   8:29 AM      Skin assessed during: Q Shift Change      [x] No Altered Skin Integrity Present    []Prevention Measures Documented      [] Yes- Altered Skin Integrity Present or Discovered   [] LDA Added if Not in Epic (Describe Wound)   [] New Altered Skin Integrity was Present on Admit and Documented in LDA   [] Wound Image Taken    Wound Care Consulted? No    Attending Nurse:  Duke Jimenez RN/Staff Member:  Darrell

## 2024-08-30 NOTE — ANESTHESIA PREPROCEDURE EVALUATION
08/30/2024  Eliseo Denson is a 54 y.o., male.  with past medical history significant for HTN, HLD, CAD s/p PCI, ICMO, HFrEF (EF 30%), s/p Dual Chamber ICD 7/26/2024 presented to [facility] with SOB on 08/23/2024 and found to have decompensated CHF. He was also febrile and had positive blood cultures for MSSA and tenderness over the ICD pocket as well as discharge from the site. He was transferred to [facility] for evaluation with TATY and CV surgery consideration of ICD removal given this was likely source of bacteremia. Of note, he was recently admitted 1 month prior with ICD placement in the setting of VT (VF) s/p Defibrillation at OSH. ID consulted for assistance in management        MSSA bacteremia  ICD infection  Sepsis 2/2 above  Hx of VF s/p Defibrillation s/p ICD Placement 7/26  ICMO  HFrEF 20%  CAD with prior stenting to LAD and D3  HTN  HLD       Pre-op Assessment    I have reviewed the Patient Summary Reports.     I have reviewed the Nursing Notes. I have reviewed the NPO Status.   I have reviewed the Medications.     Review of Systems  Cardiovascular:        CAD       CHF           Coronary Artery Disease:               Congestive Heart Failure (CHF)                          Physical Exam  General: Well nourished, Cooperative, Alert and Oriented    Airway:  Mallampati: III / II  Mouth Opening: Normal  TM Distance: Normal  Tongue: Normal  Neck ROM: Normal ROM    Dental:  Intact        Anesthesia Plan  Type of Anesthesia, risks & benefits discussed:    Anesthesia Type: Gen ETT  Intra-op Monitoring Plan: Standard ASA Monitors, Art Line and TATY  Post Op Pain Control Plan: multimodal analgesia and IV/PO Opioids PRN  Airway Plan: Direct, Post-Induction  Informed Consent: Informed consent signed with the Patient and all parties understand the risks and agree with anesthesia plan.  All questions answered.  Patient consented to blood products? Yes  ASA Score: 3  Day of Surgery Review of History & Physical: H&P Update referred to the surgeon/provider.    Ready For Surgery From Anesthesia Perspective.     .

## 2024-08-30 NOTE — OP NOTE
Date of surgery: 8/30/2024    Surgeon: Nanda Kent MD    Assistant: None    Preoperative diagnosis:  Infected ICD lead and generator    Postoperative diagnosis: Same    Procedure performed:  Extraction of ICD generator and lead    Description:  The patient was taken to the operating room and placed in supine position.  He was then induced and intubated with a single-lumen ET tube.  A transesophageal echocardiogram probe was placed, hemodynamic monitoring lines were also placed.  His chest was prepped and draped in usual sterile fashion, and a time-out was performed.  The procedure began by making an incision over the left chest wall across the previous incision for the ICD generator.  The incision was carried through the subcutaneous tissue with electrocautery until the generator pocket was reached, there was seropurulent drainage that was expressed from the pocket, culture swabs were taken from the pocket.  The ICD generator was removed of the pocket, the leads were then unscrewed.  The sutures attached to the sleeves were also removed, fluoroscopy was then brought in to visualize the leads in the right atrium and right ventricle.  The leads were then gently extracted from the right atrium and right ventricle respectively. The generator was sent for microbiology. The 2-0 Vicryl suture was placed across the subcutaneous tissue to help with the hemostasis with the previous leads were inserted.  The pocket was then irrigated with saline, a black wound VAC sponge measuring 6 cm x 6 cm by 1 cm was placed in the pocket.  Wound VAC dressings were placed on top, and the wound VAC was connected to suctioned.  There was no leak in the machine.  This concluded the procedure, there were no complications at the end.    EBL: Minimal    Dispo: PACU

## 2024-08-30 NOTE — PLAN OF CARE
Spoke to financial consoler Juliette. Patient applied for Medicaid a few weeks ago. Hospital cannot apply again. Juliette gave contact information for Medicaid to patient for him to call Medicaid to reopen and review case. Spoke to patient and spouse. They will take care of this.

## 2024-08-30 NOTE — PROGRESS NOTES
[facility]  Hospital Medicine Progress Note        Chief Complaint: Inpatient Follow-up for bacteremia    HPI:   54-year-old male with medical history of CAD/stent 2008, NSVT on amiodarone, ischemic cardiomyopathy, HTN, HLD, cardiologist Dr Arnulfo Bain who recently suffered NSTEMI and unstable VT on 7/24/2024 initially seen at [facility] ED and resuscitated/defibrillated and subsequently transferred to [facility], underwent LHC7/25/2024 by Dr. Hampton showing obstructive CAD that did not require intervention and underwent dual-chamber ICD (Biotronik) on 07/26/2024 by Dr Hussein and subsequently discharged home the next day.     Presented to [facility] ED on 08/23/2024 with complaint of shortness breath and sharp chest pain along the ICD site as well as worsening s dyspnea on exertion and orthopnea, night sweats and generalized fatigue.  His workup revealed MSSA bacteremia, transthoracic echo show LVEF 28% no evidence of vegetations on aortic or mitral valve, tricuspid and pulmonic valve not well visualized.  CTA chest show no evidence of PE, patchy bilateral ground-glass opacities suspicious for infection or pulmonary edema.  He was evaluated by Internal Medicine, Cardiology and Infectious Disease, transitioned to antibiotic with oxacillin per sensitivities.  Also received few days of IV diuresis and shortness breath has improved  Transferred to [facility] today 08/27/2024 for cardiology/TATY evaluation.     Upon arrival he was afebrile and hemodynamically stable saturating well on room air, in sinus rhythm, report intermittent sharp chest pain.    Interval Hx:   Afebrile.  Blood pressure elevated.  Alert, comfortably resting.  Scheduled for ICD removal today.  Wife at bedside.  Patient has no new complaints or concerns.  Labs showed no new major abnormalities.      Objective/physical exam:  Vitals:    08/29/24 2328 08/30/24 0326 08/30/24 0515 08/30/24 0709   BP: (!) 155/95 (!) 155/97     BP Location:        Patient Position:       Pulse: 65 64     Resp:    20   Temp: 98.6 °F (37 °C) 98.1 °F (36.7 °C)     TempSrc: Oral Oral     SpO2: 98% 97%     Weight:   104 kg (229 lb 4.5 oz)      General: In no acute distress, afebrile  Respiratory: Clear to auscultation bilaterally  Cardiovascular: S1, S2, no appreciable murmur  Abdomen: Soft, nontender, BS +  MSK: Warm, no lower extremity edema, no clubbing or cyanosis  Neurologic: Alert and oriented x4, moving all extremities with good strength     Lab Results   Component Value Date     (L) 08/30/2024    K 4.8 08/30/2024     08/30/2024    CO2 23 08/30/2024    BUN 10.0 08/30/2024    CREATININE 1.01 08/30/2024    CALCIUM 9.1 08/30/2024    ANIONGAP 8 07/27/2024    ESTGFRAFRICA 65 07/27/2024      Lab Results   Component Value Date    ALT 41 08/30/2024    AST 32 08/30/2024    ALKPHOS 101 08/30/2024    BILITOT 0.4 08/30/2024      Lab Results   Component Value Date    WBC 8.04 08/30/2024    HGB 15.2 08/30/2024    HCT 45.3 08/30/2024    MCV 91.7 08/30/2024     (H) 08/30/2024           Medications:   amiodarone  200 mg Oral Daily    amLODIPine  5 mg Oral Daily    atorvastatin  80 mg Oral Daily    carvediloL  6.25 mg Oral BID    enoxparin  40 mg Subcutaneous Q24H (prophylaxis, 1700)    oxacillin IV (PEDS and ADULTS)  2 g Intravenous Q4H    sacubitriL-valsartan  1 tablet Oral BID        Current Facility-Administered Medications:     acetaminophen, 1,000 mg, Oral, Q6H PRN    aluminum-magnesium hydroxide-simethicone, 30 mL, Oral, QID PRN    bisacodyL, 10 mg, Rectal, Daily PRN    dextromethorphan-guaiFENesin  mg/5 ml, 10 mL, Oral, Q6H PRN    hydrALAZINE, 10 mg, Intravenous, Q4H PRN    melatonin, 6 mg, Oral, Nightly PRN    ondansetron, 4 mg, Intravenous, Q4H PRN    prochlorperazine, 5 mg, Intravenous, Q6H PRN    senna-docusate 8.6-50 mg, 1 tablet, Oral, BID PRN    sodium chloride 0.9%, 10 mL, Intravenous, PRN    traMADoL, 50 mg, Oral, Q6H PRN      Assessment/Plan:    Sepsis secondary to MSSA bacteremia  ICD infection    HX:  Ischemic cardiomyopathy, HFrEF 20%, CAD s/p PCI, history of VFib s/p defibrillation and ICD placement 07/26/2024, HTN, HLD    Plan:   -scheduled for ICD removal today.  Appreciate CT surgery assistance  -id following.  Continue oxacillin 2g IV q.4.  Repeat blood cultures negative thus far   -blood pressure elevated.  Add amlodipine 5 mg.  Continue Coreg and Entresto.  Continue amiodarone  -Plavix we will be resumed once cleared from surgical standpoint.  Other home meds were reviewed  -will need home antibiotics at [place].   updated    Soumya Alonso MD

## 2024-08-31 LAB
ANION GAP SERPL CALC-SCNC: 8 MEQ/L
BACTERIA BLD CULT: NORMAL
BACTERIA BLD CULT: NORMAL
BUN SERPL-MCNC: 12.3 MG/DL (ref 8.4–25.7)
CALCIUM SERPL-MCNC: 8.9 MG/DL (ref 8.4–10.2)
CHLORIDE SERPL-SCNC: 103 MMOL/L (ref 98–107)
CO2 SERPL-SCNC: 22 MMOL/L (ref 22–29)
CREAT SERPL-MCNC: 1.02 MG/DL (ref 0.73–1.18)
CREAT/UREA NIT SERPL: 12
GFR SERPLBLD CREATININE-BSD FMLA CKD-EPI: >60 ML/MIN/1.73/M2
GLUCOSE SERPL-MCNC: 132 MG/DL (ref 74–100)
MAGNESIUM SERPL-MCNC: 2.1 MG/DL (ref 1.6–2.6)
POTASSIUM SERPL-SCNC: 5.2 MMOL/L (ref 3.5–5.1)
SODIUM SERPL-SCNC: 133 MMOL/L (ref 136–145)

## 2024-08-31 PROCEDURE — 63600175 PHARM REV CODE 636 W HCPCS: Performed by: INTERNAL MEDICINE

## 2024-08-31 PROCEDURE — 11000001 HC ACUTE MED/SURG PRIVATE ROOM

## 2024-08-31 PROCEDURE — 25000003 PHARM REV CODE 250: Performed by: INTERNAL MEDICINE

## 2024-08-31 PROCEDURE — 25000003 PHARM REV CODE 250: Performed by: NURSE PRACTITIONER

## 2024-08-31 PROCEDURE — 80048 BASIC METABOLIC PNL TOTAL CA: CPT | Performed by: INTERNAL MEDICINE

## 2024-08-31 PROCEDURE — 25000003 PHARM REV CODE 250: Performed by: PHYSICIAN ASSISTANT

## 2024-08-31 PROCEDURE — 36415 COLL VENOUS BLD VENIPUNCTURE: CPT | Performed by: INTERNAL MEDICINE

## 2024-08-31 PROCEDURE — 63600175 PHARM REV CODE 636 W HCPCS: Performed by: NURSE PRACTITIONER

## 2024-08-31 PROCEDURE — 83735 ASSAY OF MAGNESIUM: CPT | Performed by: INTERNAL MEDICINE

## 2024-08-31 RX ORDER — OXYCODONE AND ACETAMINOPHEN 5; 325 MG/1; MG/1
1 TABLET ORAL EVERY 6 HOURS PRN
Status: DISCONTINUED | OUTPATIENT
Start: 2024-08-31 | End: 2024-09-10 | Stop reason: HOSPADM

## 2024-08-31 RX ADMIN — CARVEDILOL 6.25 MG: 3.12 TABLET, FILM COATED ORAL at 08:08

## 2024-08-31 RX ADMIN — SODIUM ZIRCONIUM CYCLOSILICATE 10 G: 10 POWDER, FOR SUSPENSION ORAL at 09:08

## 2024-08-31 RX ADMIN — TRAMADOL HYDROCHLORIDE 50 MG: 50 TABLET, COATED ORAL at 09:08

## 2024-08-31 RX ADMIN — AMLODIPINE BESYLATE 5 MG: 5 TABLET ORAL at 09:08

## 2024-08-31 RX ADMIN — ENOXAPARIN SODIUM 40 MG: 40 INJECTION SUBCUTANEOUS at 05:08

## 2024-08-31 RX ADMIN — TRAMADOL HYDROCHLORIDE 50 MG: 50 TABLET, COATED ORAL at 03:08

## 2024-08-31 RX ADMIN — OXACILLIN 2 G: 2 INJECTION, POWDER, FOR SOLUTION INTRAMUSCULAR; INTRAVENOUS at 05:08

## 2024-08-31 RX ADMIN — SODIUM ZIRCONIUM CYCLOSILICATE 10 G: 10 POWDER, FOR SUSPENSION ORAL at 02:08

## 2024-08-31 RX ADMIN — AMIODARONE HYDROCHLORIDE 200 MG: 200 TABLET ORAL at 09:08

## 2024-08-31 RX ADMIN — OXACILLIN 2 G: 2 INJECTION, POWDER, FOR SOLUTION INTRAMUSCULAR; INTRAVENOUS at 01:08

## 2024-08-31 RX ADMIN — SODIUM ZIRCONIUM CYCLOSILICATE 10 G: 10 POWDER, FOR SUSPENSION ORAL at 08:08

## 2024-08-31 RX ADMIN — OXACILLIN 2 G: 2 INJECTION, POWDER, FOR SOLUTION INTRAMUSCULAR; INTRAVENOUS at 09:08

## 2024-08-31 RX ADMIN — OXYCODONE HYDROCHLORIDE AND ACETAMINOPHEN 1 TABLET: 5; 325 TABLET ORAL at 08:08

## 2024-08-31 RX ADMIN — SACUBITRIL AND VALSARTAN 1 TABLET: 24; 26 TABLET, FILM COATED ORAL at 09:08

## 2024-08-31 RX ADMIN — CARVEDILOL 6.25 MG: 3.12 TABLET, FILM COATED ORAL at 09:08

## 2024-08-31 RX ADMIN — SACUBITRIL AND VALSARTAN 1 TABLET: 24; 26 TABLET, FILM COATED ORAL at 08:08

## 2024-08-31 RX ADMIN — OXYCODONE HYDROCHLORIDE AND ACETAMINOPHEN 1 TABLET: 5; 325 TABLET ORAL at 01:08

## 2024-08-31 RX ADMIN — ATORVASTATIN CALCIUM 80 MG: 40 TABLET, FILM COATED ORAL at 09:08

## 2024-08-31 NOTE — PROGRESS NOTES
Eliseo Denson is a 54 y.o. male patient.   1. Bacteremia    2. Chest pain    3. Mechanical breakdown of cardiac electrode, subsequent encounter    4. Infected pacemaker      Past Medical History:   Diagnosis Date    CAD (coronary artery disease)     CHF (congestive heart failure)     ICD (implantable cardioverter-defibrillator) in place     ICD (implantable cardioverter-defibrillator) infection     V-tach      No past surgical history pertinent negatives on file.  Scheduled Meds:   amiodarone  200 mg Oral Daily    amLODIPine  5 mg Oral Daily    atorvastatin  80 mg Oral Daily    carvediloL  6.25 mg Oral BID    enoxparin  40 mg Subcutaneous Q24H (prophylaxis, 1700)    oxacillin IV (PEDS and ADULTS)  2 g Intravenous Q4H    sacubitriL-valsartan  1 tablet Oral BID    sodium zirconium cyclosilicate  10 g Oral TID     Continuous Infusions:  PRN Meds:  Current Facility-Administered Medications:     acetaminophen, 1,000 mg, Oral, Q6H PRN    aluminum-magnesium hydroxide-simethicone, 30 mL, Oral, QID PRN    bisacodyL, 10 mg, Rectal, Daily PRN    dextromethorphan-guaiFENesin  mg/5 ml, 10 mL, Oral, Q6H PRN    hydrALAZINE, 10 mg, Intravenous, Q4H PRN    melatonin, 6 mg, Oral, Nightly PRN    ondansetron, 4 mg, Intravenous, Q4H PRN    oxyCODONE-acetaminophen, 1 tablet, Oral, Q6H PRN    prochlorperazine, 5 mg, Intravenous, Q6H PRN    senna-docusate 8.6-50 mg, 1 tablet, Oral, BID PRN    sodium chloride 0.9%, 10 mL, Intravenous, PRN    traMADoL, 50 mg, Oral, Q6H PRN    Review of patient's allergies indicates:  No Known Allergies  Active Hospital Problems    Diagnosis  POA    *MSSA bacteremia [R78.81, B95.61]  Yes    HFrEF (heart failure with reduced ejection fraction) [I50.20]  Yes    ICD (implantable cardioverter-defibrillator) in place [Z95.810]  Yes      Resolved Hospital Problems   No resolved problems to display.     Blood pressure (!) 166/100, pulse 65, temperature 97.9 °F (36.6 °C), temperature source Axillary,  resp. rate 18, weight 105.7 kg (233 lb 0.4 oz), SpO2 96%.    Subjective:    POD #1  Awake. Alert  Wife at bedside    Objective:   AFVSS. 96% on RA  Heart: RRR  Lungs: respirations nonlabored, clear  Incision: wound vac in place    Assesment/Plan:    S/p extraction of ICD generator and lead  - continue wound care  - home wound vac and antibiotics at discharge  - ok to resume oac  - f/u with Dr. Kent in 3 weeks  - Will sign off. Please reconsult if needed  - Patient also seen by Dr. Traore and plan discussed        FRANCISCO Funez  8/31/2024

## 2024-08-31 NOTE — PROGRESS NOTES
[facility]  Hospital Medicine Progress Note        Chief Complaint: Inpatient Follow-up for bacteremia    HPI:   54-year-old male with medical history of CAD/stent 2008, NSVT on amiodarone, ischemic cardiomyopathy, HTN, HLD, cardiologist Dr Arnulfo Bain who recently suffered NSTEMI and unstable VT on 7/24/2024 initially seen at [facility] ED and resuscitated/defibrillated and subsequently transferred to [facility], underwent LHC7/25/2024 by Dr. Hampton showing obstructive CAD that did not require intervention and underwent dual-chamber ICD (Biotronik) on 07/26/2024 by Dr Hussein and subsequently discharged home the next day.     Presented to [facility] ED on 08/23/2024 with complaint of shortness breath and sharp chest pain along the ICD site as well as worsening s dyspnea on exertion and orthopnea, night sweats and generalized fatigue.  His workup revealed MSSA bacteremia, transthoracic echo show LVEF 28% no evidence of vegetations on aortic or mitral valve, tricuspid and pulmonic valve not well visualized.  CTA chest show no evidence of PE, patchy bilateral ground-glass opacities suspicious for infection or pulmonary edema.  He was evaluated by Internal Medicine, Cardiology and Infectious Disease, transitioned to antibiotic with oxacillin per sensitivities.  Also received few days of IV diuresis and shortness breath has improved  Transferred to [facility] today 08/27/2024 for cardiology/TATY evaluation.     Upon arrival he was afebrile and hemodynamically stable saturating well on room air, in sinus rhythm, report intermittent sharp chest pain.    Interval Hx:   ICD extracted yesterday.  Wound culture and device culture both showing staph aureus.  When seen at bedside he was alert, comfortably resting.  Surgical site currently connected to VAC.  Wife at bedside.  He has no new complaints or concerns.    Objective/physical exam:  Vitals:    08/31/24 0310 08/31/24 0330 08/31/24 0400 08/31/24 0645   BP: 135/87       BP Location:       Patient Position:       Pulse: 63  60    Resp:  18     Temp: 97.9 °F (36.6 °C)      TempSrc: Oral      SpO2: 95%      Weight:    105.7 kg (233 lb 0.4 oz)     General: In no acute distress, afebrile  Respiratory: Clear to auscultation bilaterally  Cardiovascular: S1, S2, no appreciable murmur  Abdomen: Soft, nontender, BS +  MSK: Warm, no lower extremity edema, no clubbing or cyanosis  Neurologic: Alert and oriented x4, moving all extremities with good strength     Lab Results   Component Value Date     (L) 08/31/2024    K 5.2 (H) 08/31/2024     08/31/2024    CO2 22 08/31/2024    BUN 12.3 08/31/2024    CREATININE 1.02 08/31/2024    CALCIUM 8.9 08/31/2024    ANIONGAP 8 07/27/2024    ESTGFRAFRICA 65 07/27/2024      Lab Results   Component Value Date    ALT 41 08/30/2024    AST 32 08/30/2024    ALKPHOS 101 08/30/2024    BILITOT 0.4 08/30/2024      Lab Results   Component Value Date    WBC 8.04 08/30/2024    HGB 15.2 08/30/2024    HCT 45.3 08/30/2024    MCV 91.7 08/30/2024     (H) 08/30/2024           Medications:   amiodarone  200 mg Oral Daily    amLODIPine  5 mg Oral Daily    atorvastatin  80 mg Oral Daily    carvediloL  6.25 mg Oral BID    enoxparin  40 mg Subcutaneous Q24H (prophylaxis, 1700)    oxacillin IV (PEDS and ADULTS)  2 g Intravenous Q4H    sacubitriL-valsartan  1 tablet Oral BID    sodium zirconium cyclosilicate  10 g Oral TID        Current Facility-Administered Medications:     acetaminophen, 1,000 mg, Oral, Q6H PRN    aluminum-magnesium hydroxide-simethicone, 30 mL, Oral, QID PRN    bisacodyL, 10 mg, Rectal, Daily PRN    dextromethorphan-guaiFENesin  mg/5 ml, 10 mL, Oral, Q6H PRN    hydrALAZINE, 10 mg, Intravenous, Q4H PRN    melatonin, 6 mg, Oral, Nightly PRN    ondansetron, 4 mg, Intravenous, Q4H PRN    prochlorperazine, 5 mg, Intravenous, Q6H PRN    senna-docusate 8.6-50 mg, 1 tablet, Oral, BID PRN    sodium chloride 0.9%, 10 mL, Intravenous, PRN     traMADoL, 50 mg, Oral, Q6H PRN    traMADoL, 50 mg, Oral, Q6H PRN     Assessment/Plan:    Sepsis secondary to MSSA bacteremia  ICD infection s/p extraction 08/30/2024    HX:  Ischemic cardiomyopathy, HFrEF 20%, CAD s/p PCI, history of VFib s/p defibrillation and ICD placement 07/26/2024, HTN, HLD    Plan:   -surgical site wound cultures and device cultures both showing staph.  Follow cultures until finalized.  -wound VAC in place.  Continue wound care  -id following.  Continue oxacillin 2g IV q.4.  Repeat blood cultures negative thus far   -continue amlodipine, Coreg and Entresto.  Continue amiodarone  -Plavix we will be resumed once cleared from surgical standpoint.  Other home meds were reviewed  -will need home antibiotics at [place].   updated     Soumya Alonso MD

## 2024-08-31 NOTE — NURSING
Nurses Note -- 4 Eyes      8/31/2024   7:00 AM      Skin assessed during: Q Shift Change      [] No Altered Skin Integrity Present    []Prevention Measures Documented      [x] Yes- Altered Skin Integrity Present or Discovered   [x] LDA Added if Not in Epic (Describe Wound)   [] New Altered Skin Integrity was Present on Admit and Documented in LDA   [x] Wound Image Taken    Wound Care Consulted? No, CT sx managing    Attending Nurse:  JAY Garcia    Second RN/Staff Member:   JAY Fish

## 2024-09-01 LAB
ALBUMIN SERPL-MCNC: 2.6 G/DL (ref 3.5–5)
ALBUMIN/GLOB SERPL: 0.6 RATIO (ref 1.1–2)
ALP SERPL-CCNC: 91 UNIT/L (ref 40–150)
ALT SERPL-CCNC: 25 UNIT/L (ref 0–55)
ANION GAP SERPL CALC-SCNC: 9 MEQ/L
AST SERPL-CCNC: 18 UNIT/L (ref 5–34)
BASOPHILS # BLD AUTO: 0.02 X10(3)/MCL
BASOPHILS NFR BLD AUTO: 0.3 %
BILIRUB SERPL-MCNC: 0.3 MG/DL
BUN SERPL-MCNC: 8.6 MG/DL (ref 8.4–25.7)
CALCIUM SERPL-MCNC: 9.2 MG/DL (ref 8.4–10.2)
CHLORIDE SERPL-SCNC: 104 MMOL/L (ref 98–107)
CO2 SERPL-SCNC: 25 MMOL/L (ref 22–29)
CREAT SERPL-MCNC: 1.06 MG/DL (ref 0.73–1.18)
CREAT/UREA NIT SERPL: 8
EOSINOPHIL # BLD AUTO: 0.1 X10(3)/MCL (ref 0–0.9)
EOSINOPHIL NFR BLD AUTO: 1.7 %
ERYTHROCYTE [DISTWIDTH] IN BLOOD BY AUTOMATED COUNT: 13.2 % (ref 11.5–17)
GFR SERPLBLD CREATININE-BSD FMLA CKD-EPI: >60 ML/MIN/1.73/M2
GLOBULIN SER-MCNC: 4.1 GM/DL (ref 2.4–3.5)
GLUCOSE SERPL-MCNC: 100 MG/DL (ref 74–100)
HCT VFR BLD AUTO: 42.9 % (ref 42–52)
HGB BLD-MCNC: 14 G/DL (ref 14–18)
IMM GRANULOCYTES # BLD AUTO: 0.04 X10(3)/MCL (ref 0–0.04)
IMM GRANULOCYTES NFR BLD AUTO: 0.7 %
LYMPHOCYTES # BLD AUTO: 1.25 X10(3)/MCL (ref 0.6–4.6)
LYMPHOCYTES NFR BLD AUTO: 21.2 %
MAGNESIUM SERPL-MCNC: 2 MG/DL (ref 1.6–2.6)
MCH RBC QN AUTO: 30 PG (ref 27–31)
MCHC RBC AUTO-ENTMCNC: 32.6 G/DL (ref 33–36)
MCV RBC AUTO: 92.1 FL (ref 80–94)
MONOCYTES # BLD AUTO: 0.61 X10(3)/MCL (ref 0.1–1.3)
MONOCYTES NFR BLD AUTO: 10.3 %
NEUTROPHILS # BLD AUTO: 3.89 X10(3)/MCL (ref 2.1–9.2)
NEUTROPHILS NFR BLD AUTO: 65.8 %
NRBC BLD AUTO-RTO: 0 %
PLATELET # BLD AUTO: 583 X10(3)/MCL (ref 130–400)
PMV BLD AUTO: 9.1 FL (ref 7.4–10.4)
POTASSIUM SERPL-SCNC: 4.4 MMOL/L (ref 3.5–5.1)
PROT SERPL-MCNC: 6.7 GM/DL (ref 6.4–8.3)
RBC # BLD AUTO: 4.66 X10(6)/MCL (ref 4.7–6.1)
SODIUM SERPL-SCNC: 138 MMOL/L (ref 136–145)
WBC # BLD AUTO: 5.91 X10(3)/MCL (ref 4.5–11.5)

## 2024-09-01 PROCEDURE — 63600175 PHARM REV CODE 636 W HCPCS: Performed by: INTERNAL MEDICINE

## 2024-09-01 PROCEDURE — 80053 COMPREHEN METABOLIC PANEL: CPT | Performed by: INTERNAL MEDICINE

## 2024-09-01 PROCEDURE — 25000003 PHARM REV CODE 250: Performed by: INTERNAL MEDICINE

## 2024-09-01 PROCEDURE — 25000003 PHARM REV CODE 250: Performed by: NURSE PRACTITIONER

## 2024-09-01 PROCEDURE — 36415 COLL VENOUS BLD VENIPUNCTURE: CPT | Performed by: INTERNAL MEDICINE

## 2024-09-01 PROCEDURE — 83735 ASSAY OF MAGNESIUM: CPT | Performed by: INTERNAL MEDICINE

## 2024-09-01 PROCEDURE — 85025 COMPLETE CBC W/AUTO DIFF WBC: CPT | Performed by: INTERNAL MEDICINE

## 2024-09-01 PROCEDURE — 63600175 PHARM REV CODE 636 W HCPCS: Performed by: NURSE PRACTITIONER

## 2024-09-01 PROCEDURE — 11000001 HC ACUTE MED/SURG PRIVATE ROOM

## 2024-09-01 RX ORDER — AMLODIPINE BESYLATE 5 MG/1
10 TABLET ORAL DAILY
Status: DISCONTINUED | OUTPATIENT
Start: 2024-09-01 | End: 2024-09-10 | Stop reason: HOSPADM

## 2024-09-01 RX ORDER — CLOPIDOGREL BISULFATE 75 MG/1
75 TABLET ORAL DAILY
Status: DISCONTINUED | OUTPATIENT
Start: 2024-09-01 | End: 2024-09-10 | Stop reason: HOSPADM

## 2024-09-01 RX ADMIN — SACUBITRIL AND VALSARTAN 1 TABLET: 24; 26 TABLET, FILM COATED ORAL at 09:09

## 2024-09-01 RX ADMIN — OXACILLIN 2 G: 2 INJECTION, POWDER, FOR SOLUTION INTRAMUSCULAR; INTRAVENOUS at 05:09

## 2024-09-01 RX ADMIN — OXACILLIN 2 G: 2 INJECTION, POWDER, FOR SOLUTION INTRAMUSCULAR; INTRAVENOUS at 09:09

## 2024-09-01 RX ADMIN — OXYCODONE HYDROCHLORIDE AND ACETAMINOPHEN 1 TABLET: 5; 325 TABLET ORAL at 09:09

## 2024-09-01 RX ADMIN — CLOPIDOGREL BISULFATE 75 MG: 75 TABLET ORAL at 03:09

## 2024-09-01 RX ADMIN — OXACILLIN 2 G: 2 INJECTION, POWDER, FOR SOLUTION INTRAMUSCULAR; INTRAVENOUS at 02:09

## 2024-09-01 RX ADMIN — OXYCODONE HYDROCHLORIDE AND ACETAMINOPHEN 1 TABLET: 5; 325 TABLET ORAL at 03:09

## 2024-09-01 RX ADMIN — ENOXAPARIN SODIUM 40 MG: 40 INJECTION SUBCUTANEOUS at 05:09

## 2024-09-01 RX ADMIN — AMLODIPINE BESYLATE 10 MG: 5 TABLET ORAL at 09:09

## 2024-09-01 RX ADMIN — CARVEDILOL 6.25 MG: 3.12 TABLET, FILM COATED ORAL at 09:09

## 2024-09-01 RX ADMIN — OXACILLIN 2 G: 2 INJECTION, POWDER, FOR SOLUTION INTRAMUSCULAR; INTRAVENOUS at 01:09

## 2024-09-01 RX ADMIN — ATORVASTATIN CALCIUM 80 MG: 40 TABLET, FILM COATED ORAL at 09:09

## 2024-09-01 RX ADMIN — AMIODARONE HYDROCHLORIDE 200 MG: 200 TABLET ORAL at 09:09

## 2024-09-01 RX ADMIN — OXYCODONE HYDROCHLORIDE AND ACETAMINOPHEN 1 TABLET: 5; 325 TABLET ORAL at 02:09

## 2024-09-01 NOTE — PLAN OF CARE
Problem: Adult Inpatient Plan of Care  Goal: Plan of Care Review  8/31/2024 2351 by Polina Baer RN  Outcome: Progressing  8/31/2024 2351 by Polina Baer RN  Outcome: Not Progressing  Goal: Patient-Specific Goal (Individualized)  8/31/2024 2351 by Polina Baer RN  Outcome: Progressing  8/31/2024 2351 by Polina Baer RN  Outcome: Not Progressing  Goal: Absence of Hospital-Acquired Illness or Injury  8/31/2024 2351 by Polina Baer RN  Outcome: Progressing  8/31/2024 2351 by Polina Baer RN  Outcome: Not Progressing  Goal: Optimal Comfort and Wellbeing  8/31/2024 2351 by Polina Baer RN  Outcome: Progressing  8/31/2024 2351 by Polina Baer RN  Outcome: Not Progressing  Goal: Readiness for Transition of Care  8/31/2024 2351 by Polina Baer RN  Outcome: Progressing  8/31/2024 2351 by Polina Baer RN  Outcome: Not Progressing     Problem: Wound  Goal: Optimal Coping  8/31/2024 2351 by Polina Baer RN  Outcome: Progressing  8/31/2024 2351 by Polina Baer RN  Outcome: Not Progressing  Goal: Optimal Functional Ability  8/31/2024 2351 by Polina Baer RN  Outcome: Progressing  8/31/2024 2351 by Polina Baer RN  Outcome: Not Progressing  Goal: Absence of Infection Signs and Symptoms  8/31/2024 2351 by Polina Baer RN  Outcome: Progressing  8/31/2024 2351 by Polina Baer RN  Outcome: Not Progressing  Goal: Improved Oral Intake  8/31/2024 2351 by Polina Baer RN  Outcome: Progressing  8/31/2024 2351 by Polina Baer RN  Outcome: Not Progressing  Goal: Optimal Pain Control and Function  8/31/2024 2351 by Polina Baer RN  Outcome: Progressing  8/31/2024 2351 by Polina Baer RN  Outcome: Not Progressing  Goal: Skin Health and Integrity  8/31/2024 2351 by Polina Baer RN  Outcome: Progressing  8/31/2024 2351 by Polina Baer RN  Outcome: Not Progressing  Goal: Optimal Wound Healing  8/31/2024 2351 by Polina Baer RN  Outcome: Progressing  8/31/2024 2351 by Buffy  Polina RN  Outcome: Not Progressing

## 2024-09-01 NOTE — PROGRESS NOTES
[facility]  Hospital Medicine Progress Note        Chief Complaint: Inpatient Follow-up for bacteremia    HPI:   54-year-old male with medical history of CAD/stent 2008, NSVT on amiodarone, ischemic cardiomyopathy, HTN, HLD, cardiologist Dr Arnulfo Bain who recently suffered NSTEMI and unstable VT on 7/24/2024 initially seen at [facility] ED and resuscitated/defibrillated and subsequently transferred to [facility], underwent LHC7/25/2024 by Dr. Hampton showing obstructive CAD that did not require intervention and underwent dual-chamber ICD (Biotronik) on 07/26/2024 by Dr Hussein and subsequently discharged home the next day.     Presented to [facility] ED on 08/23/2024 with complaint of shortness breath and sharp chest pain along the ICD site as well as worsening s dyspnea on exertion and orthopnea, night sweats and generalized fatigue.  His workup revealed MSSA bacteremia, transthoracic echo show LVEF 28% no evidence of vegetations on aortic or mitral valve, tricuspid and pulmonic valve not well visualized.  CTA chest show no evidence of PE, patchy bilateral ground-glass opacities suspicious for infection or pulmonary edema.  He was evaluated by Internal Medicine, Cardiology and Infectious Disease, transitioned to antibiotic with oxacillin per sensitivities.  Also received few days of IV diuresis and shortness breath has improved  Transferred to [facility] today 08/27/2024 for cardiology/TATY evaluation.     Upon arrival he was afebrile and hemodynamically stable saturating well on room air, in sinus rhythm, report intermittent sharp chest pain.    Interval Hx:     Blood pressure elevated.  Otherwise no new complaints or concerns.  Doing well on room air.  Wound VAC continued.  Afebrile.    Objective/physical exam:  Vitals:    09/01/24 0600 09/01/24 0601 09/01/24 0632 09/01/24 0742   BP: (!) 148/94 (!) 141/85  (!) 155/106   Pulse: 60 62  (!) 58   Resp:       Temp:    99 °F (37.2 °C)   TempSrc:    Axillary   SpO2:     96%   Weight:   105.2 kg (231 lb 14.8 oz)      General: In no acute distress, afebrile  Respiratory: Clear to auscultation bilaterally  Cardiovascular: S1, S2, no appreciable murmur  Abdomen: Soft, nontender, BS +  Skin: AICD site connected to wound VAC  Neurologic: Alert and oriented x4, moving all extremities with good strength     Lab Results   Component Value Date     (L) 08/31/2024    K 5.2 (H) 08/31/2024     08/31/2024    CO2 22 08/31/2024    BUN 12.3 08/31/2024    CREATININE 1.02 08/31/2024    CALCIUM 8.9 08/31/2024    ANIONGAP 8 07/27/2024    ESTGFRAFRICA 65 07/27/2024      Lab Results   Component Value Date    ALT 41 08/30/2024    AST 32 08/30/2024    ALKPHOS 101 08/30/2024    BILITOT 0.4 08/30/2024      Lab Results   Component Value Date    WBC 8.04 08/30/2024    HGB 15.2 08/30/2024    HCT 45.3 08/30/2024    MCV 91.7 08/30/2024     (H) 08/30/2024           Medications:   amiodarone  200 mg Oral Daily    amLODIPine  10 mg Oral Daily    atorvastatin  80 mg Oral Daily    carvediloL  6.25 mg Oral BID    enoxparin  40 mg Subcutaneous Q24H (prophylaxis, 1700)    oxacillin IV (PEDS and ADULTS)  2 g Intravenous Q4H    sacubitriL-valsartan  1 tablet Oral BID        Current Facility-Administered Medications:     acetaminophen, 1,000 mg, Oral, Q6H PRN    aluminum-magnesium hydroxide-simethicone, 30 mL, Oral, QID PRN    bisacodyL, 10 mg, Rectal, Daily PRN    dextromethorphan-guaiFENesin  mg/5 ml, 10 mL, Oral, Q6H PRN    hydrALAZINE, 10 mg, Intravenous, Q4H PRN    melatonin, 6 mg, Oral, Nightly PRN    ondansetron, 4 mg, Intravenous, Q4H PRN    oxyCODONE-acetaminophen, 1 tablet, Oral, Q6H PRN    prochlorperazine, 5 mg, Intravenous, Q6H PRN    senna-docusate 8.6-50 mg, 1 tablet, Oral, BID PRN    sodium chloride 0.9%, 10 mL, Intravenous, PRN    traMADoL, 50 mg, Oral, Q6H PRN     Assessment/Plan:    Sepsis secondary to MSSA bacteremia  ICD infection s/p extraction 08/30/2024    HX:  Ischemic  cardiomyopathy, HFrEF 20%, CAD s/p PCI, history of VFib s/p defibrillation and ICD placement 07/26/2024, HTN, HLD    Plan:   -surgical site wound cultures and device cultures both showing staph.  Follow cultures until finalized.  -wound VAC in place.  Continue wound care  -id following.  Continue oxacillin 2g IV q.4.  Repeat blood cultures negative thus far   -Increase amlodipine, Coreg and Entresto.  Continue amiodarone  -Plavix resumed   -patient does not have insurance.  will need home antibiotics at [place].   updated     Lovenox        Gonzales Alonso MD

## 2024-09-01 NOTE — NURSING
Nurses Note -- 4 Eyes      9/1/2024   7:00 AM      Skin assessed during: Q Shift Change      [] No Altered Skin Integrity Present    []Prevention Measures Documented      [x] Yes- Altered Skin Integrity Present or Discovered   [] LDA Added if Not in Epic (Describe Wound)   [] New Altered Skin Integrity was Present on Admit and Documented in LDA   [] Wound Image Taken    Wound Care Consulted? No, CT sx managing wound vac     Attending Nurse:  JAY Garcia    Second RN/Staff Member:   JAY Fish

## 2024-09-02 LAB
BACTERIA CATH TIP CULT: ABNORMAL
BACTERIA SPEC ANAEROBE CULT: NORMAL
BACTERIA WND CULT: ABNORMAL

## 2024-09-02 PROCEDURE — 63600175 PHARM REV CODE 636 W HCPCS: Performed by: INTERNAL MEDICINE

## 2024-09-02 PROCEDURE — 25000003 PHARM REV CODE 250: Performed by: INTERNAL MEDICINE

## 2024-09-02 PROCEDURE — 25000003 PHARM REV CODE 250: Performed by: NURSE PRACTITIONER

## 2024-09-02 PROCEDURE — 25000003 PHARM REV CODE 250: Performed by: PHYSICIAN ASSISTANT

## 2024-09-02 PROCEDURE — 63600175 PHARM REV CODE 636 W HCPCS: Performed by: NURSE PRACTITIONER

## 2024-09-02 PROCEDURE — 97605 NEG PRS WND THER DME<=50SQCM: CPT

## 2024-09-02 PROCEDURE — 11000001 HC ACUTE MED/SURG PRIVATE ROOM

## 2024-09-02 RX ORDER — HYDRALAZINE HYDROCHLORIDE 25 MG/1
25 TABLET, FILM COATED ORAL EVERY 8 HOURS
Status: DISCONTINUED | OUTPATIENT
Start: 2024-09-02 | End: 2024-09-10 | Stop reason: HOSPADM

## 2024-09-02 RX ADMIN — OXACILLIN 2 G: 2 INJECTION, POWDER, FOR SOLUTION INTRAMUSCULAR; INTRAVENOUS at 02:09

## 2024-09-02 RX ADMIN — SACUBITRIL AND VALSARTAN 1 TABLET: 24; 26 TABLET, FILM COATED ORAL at 09:09

## 2024-09-02 RX ADMIN — AMLODIPINE BESYLATE 10 MG: 5 TABLET ORAL at 09:09

## 2024-09-02 RX ADMIN — OXACILLIN 2 G: 2 INJECTION, POWDER, FOR SOLUTION INTRAMUSCULAR; INTRAVENOUS at 09:09

## 2024-09-02 RX ADMIN — HYDRALAZINE HYDROCHLORIDE 25 MG: 25 TABLET ORAL at 12:09

## 2024-09-02 RX ADMIN — OXYCODONE HYDROCHLORIDE AND ACETAMINOPHEN 1 TABLET: 5; 325 TABLET ORAL at 12:09

## 2024-09-02 RX ADMIN — ENOXAPARIN SODIUM 40 MG: 40 INJECTION SUBCUTANEOUS at 05:09

## 2024-09-02 RX ADMIN — CARVEDILOL 6.25 MG: 3.12 TABLET, FILM COATED ORAL at 09:09

## 2024-09-02 RX ADMIN — OXACILLIN 2 G: 2 INJECTION, POWDER, FOR SOLUTION INTRAMUSCULAR; INTRAVENOUS at 05:09

## 2024-09-02 RX ADMIN — TRAMADOL HYDROCHLORIDE 50 MG: 50 TABLET, COATED ORAL at 09:09

## 2024-09-02 RX ADMIN — CLOPIDOGREL BISULFATE 75 MG: 75 TABLET ORAL at 09:09

## 2024-09-02 RX ADMIN — OXYCODONE HYDROCHLORIDE AND ACETAMINOPHEN 1 TABLET: 5; 325 TABLET ORAL at 09:09

## 2024-09-02 RX ADMIN — AMIODARONE HYDROCHLORIDE 200 MG: 200 TABLET ORAL at 09:09

## 2024-09-02 RX ADMIN — OXYCODONE HYDROCHLORIDE AND ACETAMINOPHEN 1 TABLET: 5; 325 TABLET ORAL at 05:09

## 2024-09-02 RX ADMIN — ATORVASTATIN CALCIUM 80 MG: 40 TABLET, FILM COATED ORAL at 09:09

## 2024-09-02 RX ADMIN — HYDRALAZINE HYDROCHLORIDE 25 MG: 25 TABLET ORAL at 09:09

## 2024-09-02 NOTE — NURSING
Nurses Note -- 4 Eyes      9/2/2024   3:23 PM      Skin assessed during: Daily Assessment      [x] No Altered Skin Integrity Present    [x]Prevention Measures Documented      [] Yes- Altered Skin Integrity Present or Discovered   [] LDA Added if Not in Epic (Describe Wound)   [] New Altered Skin Integrity was Present on Admit and Documented in LDA   [] Wound Image Taken    Wound Care Consulted? No    Attending Nurse:  Maddi Jimenez RN/Staff Member:

## 2024-09-02 NOTE — PROGRESS NOTES
[facility] 6th Floor Medical Telemetry  Wound Care    Patient Name:  Eliseo Denson   MRN:  [id number]  Date: 9/2/2024  Diagnosis: MSSA bacteremia    History:     Past Medical History:   Diagnosis Date    CAD (coronary artery disease)     CHF (congestive heart failure)     ICD (implantable cardioverter-defibrillator) in place     ICD (implantable cardioverter-defibrillator) infection     V-tach        Social History     Socioeconomic History    Marital status:      Social Determinants of Health     Financial Resource Strain: Medium Risk (8/28/2024)    Overall Financial Resource Strain (CARDIA)     Difficulty of Paying Living Expenses: Somewhat hard   Food Insecurity: No Food Insecurity (8/28/2024)    Hunger Vital Sign     Worried About Running Out of Food in the Last Year: Never true     Ran Out of Food in the Last Year: Never true   Transportation Needs: No Transportation Needs (8/28/2024)    TRANSPORTATION NEEDS     Transportation : No   Housing Stability: Low Risk  (8/28/2024)    Housing Stability Vital Sign     Unable to Pay for Housing in the Last Year: No     Homeless in the Last Year: No       Precautions:     Allergies as of 08/27/2024    (No Known Allergies)       WOC Assessment Details/Treatment   WOCN consulted for wound vac change. Discussed plan of care with nurse Linda. Wound vac was placed on Friday in surgery. Next vac change is due tomorrow. Supplies ordered! Will follow up.   09/02/2024

## 2024-09-02 NOTE — PROGRESS NOTES
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 Hampton showing obstructive CAD that did not require intervention and underwent dual-chamber ICD (Biotronik) on 07/26/2024 by Dr Hussein and subsequently discharged home the next day.     Presented to [facility] ED on 08/23/2024 with complaint of shortness breath and sharp chest pain along the ICD site as well as worsening s dyspnea on exertion and orthopnea, night sweats and generalized fatigue.  His workup revealed MSSA bacteremia, transthoracic echo show LVEF 28% no evidence of vegetations on aortic or mitral valve, tricuspid and pulmonic valve not well visualized.  CTA chest show no evidence of PE, patchy bilateral ground-glass opacities suspicious for infection or pulmonary edema.  He was evaluated by Internal Medicine, Cardiology and Infectious Disease, transitioned to antibiotic with oxacillin per sensitivities.  Also received few days of IV diuresis and shortness breath has improved  Transferred to [facility] today 08/27/2024 for cardiology/TATY evaluation.     Upon arrival he was afebrile and hemodynamically stable saturating well on room air, in sinus rhythm, report intermittent sharp chest pain.    Interval Hx:     Blood pressure uncontrolled.  Otherwise no issues.  Doing well on room air.  Denies any, chills.  Wound and device culture showing staph aureus, HGB finalized    Objective/physical exam:  Vitals:    09/02/24 0544 09/02/24 0547 09/02/24 0734 09/02/24 0931   BP:   (!) 165/93    BP Location:   Left arm    Patient Position:   Lying    Pulse:   63    Resp:   18 18 18   Temp:   97.9 °F (36.6 °C)    TempSrc:   Oral    SpO2:   96%    Weight: 104.8 kg (231 lb 0.7 oz)        General: In no acute distress, afebrile  Respiratory: Clear to auscultation bilaterally  Cardiovascular: S1, S2, no appreciable murmur  Abdomen: Soft, nontender, BS +  Skin: AICD site connected to wound VAC  Neurologic: Alert and oriented x4, moving all extremities with good strength     Lab Results   Component Value Date     09/01/2024    K 4.4 09/01/2024     09/01/2024    CO2 25 09/01/2024    BUN 8.6 09/01/2024    CREATININE 1.06 09/01/2024    CALCIUM 9.2 09/01/2024    ANIONGAP 8 07/27/2024    ESTGFRAFRICA 65 07/27/2024      Lab Results   Component Value Date    ALT 25 09/01/2024    AST 18 09/01/2024    ALKPHOS 91 09/01/2024    BILITOT 0.3 09/01/2024      Lab Results   Component Value Date    WBC 5.91 09/01/2024    HGB 14.0 09/01/2024    HCT 42.9 09/01/2024    MCV 92.1 09/01/2024     (H) 09/01/2024           Medications:   amiodarone  200 mg Oral Daily    amLODIPine  10 mg Oral Daily    atorvastatin  80 mg Oral Daily    carvediloL  6.25 mg Oral BID    clopidogreL  75 mg Oral Daily    enoxparin  40 mg Subcutaneous Q24H (prophylaxis, 1700)    oxacillin IV (PEDS and ADULTS)  2 g Intravenous Q4H    sacubitriL-valsartan  1 tablet Oral BID        Current Facility-Administered Medications:     acetaminophen, 1,000 mg, Oral, Q6H PRN    aluminum-magnesium hydroxide-simethicone, 30 mL, Oral, QID PRN    bisacodyL, 10 mg, Rectal, Daily PRN    dextromethorphan-guaiFENesin  mg/5 ml, 10 mL, Oral, Q6H PRN    hydrALAZINE, 10 mg, Intravenous, Q4H PRN    melatonin, 6 mg, Oral, Nightly PRN    ondansetron, 4 mg, Intravenous, Q4H PRN    oxyCODONE-acetaminophen, 1 tablet, Oral, Q6H PRN    prochlorperazine, 5 mg, Intravenous, Q6H PRN    senna-docusate 8.6-50 mg, 1 tablet, Oral, BID PRN    sodium chloride 0.9%, 10 mL, Intravenous, PRN    traMADoL, 50 mg, Oral, Q6H PRN     Assessment/Plan:    Sepsis  secondary to MSSA bacteremia  ICD infection s/p extraction 08/30/2024    HX:  Ischemic cardiomyopathy, HFrEF 20%, CAD s/p PCI, history of VFib s/p defibrillation and ICD placement 07/26/2024, HTN, HLD    Plan:   -surgical site wound cultures and device cultures both showing staph.  Follow cultures until finalized.  -wound VAC in place.  Continue wound care  -id following.  Continue oxacillin 2g IV q.4.    -add hydralazine.  Continue amlodipine, Coreg and Entresto at current dose.  -continue telemetry.  Continue amiodarone.  Plavix resumed  -patient does not have insurance.  will need home antibiotics at [place].   updated     Lovenox        Gonzales Alonso MD

## 2024-09-02 NOTE — ANESTHESIA POSTPROCEDURE EVALUATION
Anesthesia Post Evaluation    Patient: Eliseo Denson    Procedure(s) Performed: * No procedures listed *    Final Anesthesia Type: general      Patient location during evaluation: PACU  Patient participation: Yes- Able to Participate  Level of consciousness: awake and alert  Post-procedure vital signs: reviewed and stable  Pain management: adequate  Airway patency: patent      Anesthetic complications: no      Cardiovascular status: blood pressure returned to baseline  Respiratory status: unassisted  Hydration status: euvolemic  Follow-up not needed.              Vitals Value Taken Time   /68 09/02/24 1527   Temp 36.6 °C (97.8 °F) 09/02/24 1527   Pulse 67 09/02/24 1527   Resp 18 09/02/24 1527   SpO2 96 % 09/02/24 1527         No case tracking events are documented in the log.      Pain/Hiwot Score: Pain Rating Prior to Med Admin: 10 (9/2/2024 12:57 PM)  Pain Rating Post Med Admin: 1 (9/2/2024  6:47 AM)

## 2024-09-02 NOTE — PROGRESS NOTES
[facility] 6th Floor Medical Telemetry  Wound Care    Patient Name:  Eliseo Denson   MRN:  [id number]  Date: 9/2/2024  Diagnosis: MSSA bacteremia    History:     Past Medical History:   Diagnosis Date    CAD (coronary artery disease)     CHF (congestive heart failure)     ICD (implantable cardioverter-defibrillator) in place     ICD (implantable cardioverter-defibrillator) infection     V-tach        Social History     Socioeconomic History    Marital status:      Social Determinants of Health     Financial Resource Strain: Medium Risk (8/28/2024)    Overall Financial Resource Strain (CARDIA)     Difficulty of Paying Living Expenses: Somewhat hard   Food Insecurity: No Food Insecurity (8/28/2024)    Hunger Vital Sign     Worried About Running Out of Food in the Last Year: Never true     Ran Out of Food in the Last Year: Never true   Transportation Needs: No Transportation Needs (8/28/2024)    TRANSPORTATION NEEDS     Transportation : No   Housing Stability: Low Risk  (8/28/2024)    Housing Stability Vital Sign     Unable to Pay for Housing in the Last Year: No     Homeless in the Last Year: No       Precautions:     Allergies as of 08/27/2024    (No Known Allergies)       WO Assessment Details/Treatment      09/02/24 1201   WOCN Assessment   Visit Date 09/02/24   Visit Time 1201   Consult Type New   WOCN Speciality Wound   WOCN List wound vac   Wound surgical   Procedure wound vac   Intervention chart review;assessed;applied   Teaching on-going        Wound 08/27/24 2000 Other (comment) Left upper Chest   Date First Assessed/Time First Assessed: 08/27/24 2000   Primary Wound Type: (c) Other (comment)  Side: Left  Orientation: upper  Location: Chest   Wound Image    Dressing Appearance Intact;Dry;Clean;other (see comments)  (NPWT)   Drainage Amount Small   Drainage Characteristics/Odor Serosanguineous   Appearance Red;Moist   Tissue loss description Full thickness   Black (%), Wound  Tissue Color 0 %   Red (%), Wound Tissue Color 100 %   Yellow (%), Wound Tissue Color 0 %   Periwound Area Intact;Dry   Wound Edges Defined   Wound Length (cm) 1.4 cm   Wound Width (cm) 4.7 cm   Wound Depth (cm) 1.1 cm   Wound Volume (cm^3) 7.238 cm^3   Wound Surface Area (cm^2) 6.58 cm^2   Undermining (depth (cm)/location) From 4 to 8 oclock 4.3cm   Care Cleansed with:;Antimicrobial agent;Wound cleanser;Other (see comments)  (Vashe)   Dressing Applied;Foam;Transparent film;Other (comment)  (NPWT)        Negative Pressure Wound Therapy  08/30/24 1207 Left upper   Placement Date/Time: 08/30/24 1207   Side: Left  Orientation: upper  Location: Chest  Additional Comments: wound vac [id number]   NPWT Type Vacuum Therapy   Therapy Setting NPWT Continuous therapy   Pressure Setting NPWT 125 mmHg   Therapy Interventions NPWT Canister changed;Dressing changed;Seal intact   Sponges Inserted NPWT White;Black   Sponges Removed NPWT Black   General Output (mL) 50     WOCN consulted for wound vac change. Discussed plan of care with nurse Linda prior to visiting the patient. Introduced self and explained reason for visit. Patient agreeable to be seen. Wife at bedside. Old dressing removed, area cleansed, new photographs taken for EMR. Wound vac dressing reapplied, patient tolerated well. Next wound vac change is due 9/5/2024. Nursing to continue with vac monitoring and other preventative measures. Will follow up.   09/02/2024

## 2024-09-02 NOTE — NURSING
Nurses Note -- 4 Eyes      9/2/2024   3:19 PM      Skin assessed during: Daily Assessment      [x] No Altered Skin Integrity Present    [x]Prevention Measures Documented      [] Yes- Altered Skin Integrity Present or Discovered   [] LDA Added if Not in Epic (Describe Wound)   [] New Altered Skin Integrity was Present on Admit and Documented in LDA   [] Wound Image Taken    Wound Care Consulted? No    Attending Nurse:  Maddi Jimenez RN/Staff Member:

## 2024-09-03 LAB
ANION GAP SERPL CALC-SCNC: 9 MEQ/L
BASOPHILS # BLD AUTO: 0.04 X10(3)/MCL
BASOPHILS NFR BLD AUTO: 0.5 %
BUN SERPL-MCNC: 8.5 MG/DL (ref 8.4–25.7)
CALCIUM SERPL-MCNC: 9.6 MG/DL (ref 8.4–10.2)
CHLORIDE SERPL-SCNC: 104 MMOL/L (ref 98–107)
CO2 SERPL-SCNC: 22 MMOL/L (ref 22–29)
CREAT SERPL-MCNC: 0.98 MG/DL (ref 0.73–1.18)
CREAT/UREA NIT SERPL: 9
EOSINOPHIL # BLD AUTO: 0.19 X10(3)/MCL (ref 0–0.9)
EOSINOPHIL NFR BLD AUTO: 2.5 %
ERYTHROCYTE [DISTWIDTH] IN BLOOD BY AUTOMATED COUNT: 13.2 % (ref 11.5–17)
GFR SERPLBLD CREATININE-BSD FMLA CKD-EPI: >60 ML/MIN/1.73/M2
GLUCOSE SERPL-MCNC: 115 MG/DL (ref 74–100)
HCT VFR BLD AUTO: 43.5 % (ref 42–52)
HGB BLD-MCNC: 14.5 G/DL (ref 14–18)
IMM GRANULOCYTES # BLD AUTO: 0.04 X10(3)/MCL (ref 0–0.04)
IMM GRANULOCYTES NFR BLD AUTO: 0.5 %
LYMPHOCYTES # BLD AUTO: 1.33 X10(3)/MCL (ref 0.6–4.6)
LYMPHOCYTES NFR BLD AUTO: 17.6 %
MAGNESIUM SERPL-MCNC: 1.9 MG/DL (ref 1.6–2.6)
MCH RBC QN AUTO: 29.8 PG (ref 27–31)
MCHC RBC AUTO-ENTMCNC: 33.3 G/DL (ref 33–36)
MCV RBC AUTO: 89.5 FL (ref 80–94)
MONOCYTES # BLD AUTO: 0.74 X10(3)/MCL (ref 0.1–1.3)
MONOCYTES NFR BLD AUTO: 9.8 %
NEUTROPHILS # BLD AUTO: 5.22 X10(3)/MCL (ref 2.1–9.2)
NEUTROPHILS NFR BLD AUTO: 69.1 %
NRBC BLD AUTO-RTO: 0 %
PLATELET # BLD AUTO: 593 X10(3)/MCL (ref 130–400)
PMV BLD AUTO: 8.5 FL (ref 7.4–10.4)
POTASSIUM SERPL-SCNC: 4.1 MMOL/L (ref 3.5–5.1)
RBC # BLD AUTO: 4.86 X10(6)/MCL (ref 4.7–6.1)
SODIUM SERPL-SCNC: 135 MMOL/L (ref 136–145)
WBC # BLD AUTO: 7.56 X10(3)/MCL (ref 4.5–11.5)

## 2024-09-03 PROCEDURE — 36415 COLL VENOUS BLD VENIPUNCTURE: CPT | Performed by: INTERNAL MEDICINE

## 2024-09-03 PROCEDURE — 94760 N-INVAS EAR/PLS OXIMETRY 1: CPT

## 2024-09-03 PROCEDURE — 85025 COMPLETE CBC W/AUTO DIFF WBC: CPT | Performed by: INTERNAL MEDICINE

## 2024-09-03 PROCEDURE — 25000003 PHARM REV CODE 250: Performed by: NURSE PRACTITIONER

## 2024-09-03 PROCEDURE — 11000001 HC ACUTE MED/SURG PRIVATE ROOM

## 2024-09-03 PROCEDURE — 25000003 PHARM REV CODE 250: Performed by: INTERNAL MEDICINE

## 2024-09-03 PROCEDURE — 80048 BASIC METABOLIC PNL TOTAL CA: CPT | Performed by: INTERNAL MEDICINE

## 2024-09-03 PROCEDURE — 63600175 PHARM REV CODE 636 W HCPCS: Performed by: NURSE PRACTITIONER

## 2024-09-03 PROCEDURE — 63600175 PHARM REV CODE 636 W HCPCS: Performed by: INTERNAL MEDICINE

## 2024-09-03 PROCEDURE — 83735 ASSAY OF MAGNESIUM: CPT | Performed by: INTERNAL MEDICINE

## 2024-09-03 RX ADMIN — AMLODIPINE BESYLATE 10 MG: 5 TABLET ORAL at 08:09

## 2024-09-03 RX ADMIN — OXYCODONE HYDROCHLORIDE AND ACETAMINOPHEN 1 TABLET: 5; 325 TABLET ORAL at 09:09

## 2024-09-03 RX ADMIN — OXACILLIN 2 G: 2 INJECTION, POWDER, FOR SOLUTION INTRAMUSCULAR; INTRAVENOUS at 03:09

## 2024-09-03 RX ADMIN — ENOXAPARIN SODIUM 40 MG: 40 INJECTION SUBCUTANEOUS at 06:09

## 2024-09-03 RX ADMIN — SACUBITRIL AND VALSARTAN 1 TABLET: 24; 26 TABLET, FILM COATED ORAL at 09:09

## 2024-09-03 RX ADMIN — OXACILLIN 2 G: 2 INJECTION, POWDER, FOR SOLUTION INTRAMUSCULAR; INTRAVENOUS at 09:09

## 2024-09-03 RX ADMIN — OXYCODONE HYDROCHLORIDE AND ACETAMINOPHEN 1 TABLET: 5; 325 TABLET ORAL at 03:09

## 2024-09-03 RX ADMIN — SACUBITRIL AND VALSARTAN 1 TABLET: 24; 26 TABLET, FILM COATED ORAL at 08:09

## 2024-09-03 RX ADMIN — ATORVASTATIN CALCIUM 80 MG: 40 TABLET, FILM COATED ORAL at 08:09

## 2024-09-03 RX ADMIN — HYDRALAZINE HYDROCHLORIDE 25 MG: 25 TABLET ORAL at 03:09

## 2024-09-03 RX ADMIN — HYDRALAZINE HYDROCHLORIDE 25 MG: 25 TABLET ORAL at 06:09

## 2024-09-03 RX ADMIN — CLOPIDOGREL BISULFATE 75 MG: 75 TABLET ORAL at 08:09

## 2024-09-03 RX ADMIN — CARVEDILOL 6.25 MG: 3.12 TABLET, FILM COATED ORAL at 08:09

## 2024-09-03 RX ADMIN — AMIODARONE HYDROCHLORIDE 200 MG: 200 TABLET ORAL at 08:09

## 2024-09-03 RX ADMIN — OXACILLIN 2 G: 2 INJECTION, POWDER, FOR SOLUTION INTRAMUSCULAR; INTRAVENOUS at 06:09

## 2024-09-03 RX ADMIN — OXYCODONE HYDROCHLORIDE AND ACETAMINOPHEN 1 TABLET: 5; 325 TABLET ORAL at 04:09

## 2024-09-03 RX ADMIN — CARVEDILOL 6.25 MG: 3.12 TABLET, FILM COATED ORAL at 09:09

## 2024-09-03 NOTE — PROGRESS NOTES
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 Hampton showing obstructive CAD that did not require intervention and underwent dual-chamber ICD (Biotronik) on 07/26/2024 by Dr Hussein and subsequently discharged home the next day.     Presented to [facility] ED on 08/23/2024 with complaint of shortness breath and sharp chest pain along the ICD site as well as worsening s dyspnea on exertion and orthopnea, night sweats and generalized fatigue.  His workup revealed MSSA bacteremia, transthoracic echo show LVEF 28% no evidence of vegetations on aortic or mitral valve, tricuspid and pulmonic valve not well visualized.  CTA chest show no evidence of PE, patchy bilateral ground-glass opacities suspicious for infection or pulmonary edema.  He was evaluated by Internal Medicine, Cardiology and Infectious Disease, transitioned to antibiotic with oxacillin per sensitivities.  Also received few days of IV diuresis and shortness breath has improved  Transferred to [facility] today 08/27/2024 for cardiology/PERNELL evaluation.     Upon arrival he was afebrile and hemodynamically stable saturating well on room air, in sinus rhythm, report intermittent sharp chest pain.    Pernell was negative for vegetations.  Id was consulted, empiric antibiotics were continued.  Id suggested removal of ICD.  ICD was extracted and wound VAC was inserted.  Hypertensives were adjusted during hospital stay.  Doppler of right upper extremity obtained due to complaints of pain revealed cephalic vein superficial thrombosis.    Interval Hx:     Afebrile.  Blood  pressure mildly elevated.  Alert, oriented, comfortably resting.  Patient has no new complaints or concerns.  Thrombocytosis noted.  No major electrolyte abnormality seen.  Cultures confirmed as MSSA.      Objective/physical exam:  Vitals:    09/02/24 2328 09/03/24 0320 09/03/24 0515 09/03/24 0740   BP: (!) 151/94 (!) 156/95  (!) 157/95   BP Location:       Patient Position:       Pulse: 65 65  68   Resp:  20  18   Temp: 98.4 °F (36.9 °C) 98.1 °F (36.7 °C)  98.7 °F (37.1 °C)   TempSrc: Oral Oral  Oral   SpO2: 96% 98%  97%   Weight:   102.8 kg (226 lb 10.1 oz)      General: In no acute distress, afebrile  Respiratory: Clear to auscultation bilaterally  Cardiovascular: S1, S2, no appreciable murmur  Abdomen: Soft, nontender, BS +  Skin: AICD site connected to wound VAC  Neurologic: Alert and oriented x4, moving all extremities with good strength     Lab Results   Component Value Date     (L) 09/03/2024    K 4.1 09/03/2024     09/03/2024    CO2 22 09/03/2024    BUN 8.5 09/03/2024    CREATININE 0.98 09/03/2024    CALCIUM 9.6 09/03/2024    ANIONGAP 8 07/27/2024    ESTGFRAFRICA 65 07/27/2024      Lab Results   Component Value Date    ALT 25 09/01/2024    AST 18 09/01/2024    ALKPHOS 91 09/01/2024    BILITOT 0.3 09/01/2024      Lab Results   Component Value Date    WBC 7.56 09/03/2024    HGB 14.5 09/03/2024    HCT 43.5 09/03/2024    MCV 89.5 09/03/2024     (H) 09/03/2024           Medications:   amiodarone  200 mg Oral Daily    amLODIPine  10 mg Oral Daily    atorvastatin  80 mg Oral Daily    carvediloL  6.25 mg Oral BID    clopidogreL  75 mg Oral Daily    enoxparin  40 mg Subcutaneous Q24H (prophylaxis, 1700)    hydrALAZINE  25 mg Oral Q8H    oxacillin IV (PEDS and ADULTS)  2 g Intravenous Q4H    sacubitriL-valsartan  1 tablet Oral BID        Current Facility-Administered Medications:     acetaminophen, 1,000 mg, Oral, Q6H PRN    aluminum-magnesium hydroxide-simethicone, 30 mL, Oral, QID PRN     bisacodyL, 10 mg, Rectal, Daily PRN    dextromethorphan-guaiFENesin  mg/5 ml, 10 mL, Oral, Q6H PRN    hydrALAZINE, 10 mg, Intravenous, Q4H PRN    melatonin, 6 mg, Oral, Nightly PRN    ondansetron, 4 mg, Intravenous, Q4H PRN    oxyCODONE-acetaminophen, 1 tablet, Oral, Q6H PRN    prochlorperazine, 5 mg, Intravenous, Q6H PRN    senna-docusate 8.6-50 mg, 1 tablet, Oral, BID PRN    sodium chloride 0.9%, 10 mL, Intravenous, PRN    traMADoL, 50 mg, Oral, Q6H PRN     Assessment/Plan:    Sepsis secondary to MSSA bacteremia and ICD infection  S/p AICD extraction 08/30/2024 and wound VAC placement  Uncontrolled hypertension    HX:  Ischemic cardiomyopathy, HFrEF 20%, CAD s/p PCI, history of VFib s/p defibrillation and ICD placement 07/26/2024, HTN, HLD    Plan:   -all cultures confirmed as MSSA.  Continue oxacillin 2 g IV q.4.  D following  -continue wound VAC.  Wound care  -he needs close follow up with Cardiology for new ICD insertion after completion of antibiotics.  May need LifeVest in the meantime  -continue hydralazine, amlodipine, Coreg and Entresto at current dose.  Encourage p.o. intake, ambulation  -Continue amiodarone.  Plavix resumed  -patient does not have insurance.  will need home antibiotics at [place].   updated     Soumya Alonso MD

## 2024-09-03 NOTE — ANESTHESIA POSTPROCEDURE EVALUATION
Anesthesia Post Evaluation    Patient: Eliseo Denson    Procedure(s) Performed: Procedure(s) (LRB):  EXTRACTION, ELECTRODE LEAD (N/A)    Final Anesthesia Type: general      Patient location during evaluation: PACU  Patient participation: Yes- Able to Participate  Level of consciousness: awake and alert  Post-procedure vital signs: reviewed and stable  Pain management: adequate  Airway patency: patent      Anesthetic complications: no      Cardiovascular status: hemodynamically stable  Respiratory status: unassisted  Hydration status: euvolemic  Follow-up not needed.              Vitals Value Taken Time   /95 09/03/24 0320   Temp 36.7 °C (98.1 °F) 09/03/24 0320   Pulse 65 09/03/24 0320   Resp 20 09/03/24 0320   SpO2 98 % 09/03/24 0320         Event Time   Out of Recovery 08/30/2024 12:33:00         Pain/Hiwot Score: Pain Rating Prior to Med Admin: 7 (9/3/2024  3:20 AM)  Pain Rating Post Med Admin: 0 (9/3/2024  4:20 AM)           I have an area on my buttock

## 2024-09-04 PROCEDURE — 25000003 PHARM REV CODE 250: Performed by: NURSE PRACTITIONER

## 2024-09-04 PROCEDURE — 25000003 PHARM REV CODE 250: Performed by: INTERNAL MEDICINE

## 2024-09-04 PROCEDURE — 25000003 PHARM REV CODE 250: Performed by: PHYSICIAN ASSISTANT

## 2024-09-04 PROCEDURE — 63600175 PHARM REV CODE 636 W HCPCS: Performed by: INTERNAL MEDICINE

## 2024-09-04 PROCEDURE — 11000001 HC ACUTE MED/SURG PRIVATE ROOM

## 2024-09-04 PROCEDURE — 63600175 PHARM REV CODE 636 W HCPCS: Performed by: NURSE PRACTITIONER

## 2024-09-04 RX ADMIN — CARVEDILOL 6.25 MG: 3.12 TABLET, FILM COATED ORAL at 09:09

## 2024-09-04 RX ADMIN — OXACILLIN 2 G: 2 INJECTION, POWDER, FOR SOLUTION INTRAMUSCULAR; INTRAVENOUS at 09:09

## 2024-09-04 RX ADMIN — ATORVASTATIN CALCIUM 80 MG: 40 TABLET, FILM COATED ORAL at 09:09

## 2024-09-04 RX ADMIN — SACUBITRIL AND VALSARTAN 1 TABLET: 24; 26 TABLET, FILM COATED ORAL at 09:09

## 2024-09-04 RX ADMIN — OXACILLIN 2 G: 2 INJECTION, POWDER, FOR SOLUTION INTRAMUSCULAR; INTRAVENOUS at 05:09

## 2024-09-04 RX ADMIN — OXACILLIN 2 G: 2 INJECTION, POWDER, FOR SOLUTION INTRAMUSCULAR; INTRAVENOUS at 06:09

## 2024-09-04 RX ADMIN — HYDRALAZINE HYDROCHLORIDE 25 MG: 25 TABLET ORAL at 06:09

## 2024-09-04 RX ADMIN — AMLODIPINE BESYLATE 10 MG: 5 TABLET ORAL at 09:09

## 2024-09-04 RX ADMIN — OXACILLIN 2 G: 2 INJECTION, POWDER, FOR SOLUTION INTRAMUSCULAR; INTRAVENOUS at 02:09

## 2024-09-04 RX ADMIN — HYDRALAZINE HYDROCHLORIDE 25 MG: 25 TABLET ORAL at 02:09

## 2024-09-04 RX ADMIN — CLOPIDOGREL BISULFATE 75 MG: 75 TABLET ORAL at 09:09

## 2024-09-04 RX ADMIN — ENOXAPARIN SODIUM 40 MG: 40 INJECTION SUBCUTANEOUS at 05:09

## 2024-09-04 RX ADMIN — TRAMADOL HYDROCHLORIDE 50 MG: 50 TABLET, COATED ORAL at 09:09

## 2024-09-04 RX ADMIN — OXYCODONE HYDROCHLORIDE AND ACETAMINOPHEN 1 TABLET: 5; 325 TABLET ORAL at 03:09

## 2024-09-04 RX ADMIN — HYDRALAZINE HYDROCHLORIDE 25 MG: 25 TABLET ORAL at 09:09

## 2024-09-04 RX ADMIN — OXYCODONE HYDROCHLORIDE AND ACETAMINOPHEN 1 TABLET: 5; 325 TABLET ORAL at 09:09

## 2024-09-04 RX ADMIN — AMIODARONE HYDROCHLORIDE 200 MG: 200 TABLET ORAL at 09:09

## 2024-09-04 NOTE — PROGRESS NOTES
Inpatient Nutrition Evaluation    Admit Date: 8/27/2024   Total duration of encounter: 8 days    Nutrition Recommendation/Prescription     Diet Heart Healthy ordered  Offer sodium-free seasoning on trays per patient's request  Encouraged adequate PO intake  Monitor appetite/PO intake, weight, and labs    Nutrition Assessment     Chart Review    Reason Seen: length of stay    Malnutrition Screening Tool Results   Have you recently lost weight without trying?: No  Have you been eating poorly because of a decreased appetite?: No   MST Score: 0     Diagnosis:  Sepsis secondary to MSSA bacteremia and ICD infection  S/p AICD extraction 08/30/2024 and wound VAC placement  Uncontrolled hypertension    Relevant Medical History:   CAD/stents 2008  [facility] 07/25/2024:  mid LAD patent stent with 30% In-stent restenosis, patent stent in ostial/proximal 3rd diagonal, LCx 100%  distal to OM2, on 2 normal with proximal 50% stenosis, om 3 distal circumflex fell via well-established right-to-left collaterals from proximal RV marginal branch, RCA diffuse 20-30% stenosis, right PDA normal with proximal 40-50% stenosis.     Ischemic cardiomyopathy-ChronicHFrEF (TTE 8/23/2024: LVEF 28%)     Nonsustained VT  Dual-chamber ICD (Biotronik, MR conditional) placement 07/26/2024  Hypertension   Hyperlipidemia    Nutrition-Related Medications:   Scheduled Meds:   amiodarone  200 mg Oral Daily    amLODIPine  10 mg Oral Daily    atorvastatin  80 mg Oral Daily    carvediloL  6.25 mg Oral BID    clopidogreL  75 mg Oral Daily    enoxparin  40 mg Subcutaneous Q24H (prophylaxis, 1700)    hydrALAZINE  25 mg Oral Q8H    oxacillin IV (PEDS and ADULTS)  2 g Intravenous Q4H    sacubitriL-valsartan  1 tablet Oral BID     Continuous Infusions:  PRN Meds:.  Current Facility-Administered Medications:     acetaminophen, 1,000 mg, Oral, Q6H PRN    aluminum-magnesium hydroxide-simethicone, 30 mL, Oral, QID PRN    bisacodyL, 10 mg, Rectal, Daily PRN     dextromethorphan-guaiFENesin  mg/5 ml, 10 mL, Oral, Q6H PRN    hydrALAZINE, 10 mg, Intravenous, Q4H PRN    melatonin, 6 mg, Oral, Nightly PRN    ondansetron, 4 mg, Intravenous, Q4H PRN    oxyCODONE-acetaminophen, 1 tablet, Oral, Q6H PRN    prochlorperazine, 5 mg, Intravenous, Q6H PRN    senna-docusate 8.6-50 mg, 1 tablet, Oral, BID PRN    sodium chloride 0.9%, 10 mL, Intravenous, PRN    traMADoL, 50 mg, Oral, Q6H PRN      Nutrition-Related Labs:  Recent Labs   Lab 08/29/24  0625 08/30/24  0445 08/31/24  0337 09/01/24  0945 09/03/24  0531    135* 133* 138 135*   K 4.2 4.8 5.2* 4.4 4.1   CALCIUM 8.9 9.1 8.9 9.2 9.6   MG 2.00 1.90 2.10 2.00 1.90    102 103 104 104   CO2 27 23 22 25 22   BUN 9.3 10.0 12.3 8.6 8.5   CREATININE 1.05 1.01 1.02 1.06 0.98   EGFRNORACEVR >60 >60 >60 >60 >60   GLUCOSE 102* 108* 132* 100 115*   BILITOT 0.4 0.4  --  0.3  --    ALKPHOS 103 101  --  91  --    ALT 37 41  --  25  --    AST 33 32  --  18  --    ALBUMIN 2.6* 2.9*  --  2.6*  --    WBC 7.16 8.04  --  5.91 7.56   HGB 14.3 15.2  --  14.0 14.5   HCT 43.3 45.3  --  42.9 43.5         Diet Order: Diet Heart Healthy  Oral Supplement Order: none  Appetite/Oral Intake: good/% of meals  Factors Affecting Nutritional Intake: none identified  Food/Mormon/Cultural Preferences: none reported  Food Allergies: none reported    Retired:Skin Integrity: wound  Wound(s):      Wound 08/27/24 2000 Other (comment) Left upper Chest-Tissue loss description: Full thickness noted    Comments    9/4/2024: No significant fat/muscle wasting per NFPE. Pt reports a good appetite/PO intake prior to admit and currently. Pt reports need for sodium-free seasoning. Pt denies N/V/D/C and chewing/swallowing difficulties. Last BM documented as 9/1/2024. Per EMR, pt weighed 102.1 kg on 8/23/2024. Encouraged adequate PO intake. Will monitor.    Anthropometrics         Last Weight: 104 kg (229 lb 4.5 oz) (09/04/24 0607) Weight Method: Standard  Scale  BMI (Calculated): 32  BMI Classification: obese grade I (BMI 30-34.9)                                Usual Body Weight (UBW), k.1 kg  % Usual Body Weight: 102.07     Usual Weight Provided By: EMR weight history    Wt Readings from Last 5 Encounters:   24 104 kg (229 lb 4.5 oz)   24 107 kg (236 lb)   24 111.7 kg (246 lb 4.1 oz)     Weight Change(s) Since Admission:  Admit Weight: 105 kg (231 lb 8 oz) (24 2100)  2024: 104 kg    Patient Education    Not applicable.    Monitoring & Evaluation     Dietitian will monitor food and beverage intake, weight, electrolyte/renal panel, glucose/endocrine profile, and gastrointestinal profile.  Nutrition Risk/Follow-Up: low (follow-up in 5-7 days)  Patients assigned 'low nutrition risk' status do not qualify for a full nutritional assessment but will be monitored and re-evaluated in a 5-7 day time period. Please consult if re-evaluation needed sooner.

## 2024-09-04 NOTE — PROGRESS NOTES
[facility]  Hospital Medicine Progress Note        Chief Complaint: Inpatient Follow-up for bacteremia    HPI:   54-year-old male with medical history of CAD/stent 2008, NSVT on amiodarone, ischemic cardiomyopathy, HTN, HLD, cardiologist Dr Arnulfo Bain who recently suffered NSTEMI and unstable VT on 7/24/2024 initially seen at [facility] ED and resuscitated/defibrillated and subsequently transferred to [facility], underwent LHC7/25/2024 by Dr. Hampton showing obstructive CAD that did not require intervention and underwent dual-chamber ICD (Biotronik) on 07/26/2024 by Dr Hussein and subsequently discharged home the next day.     Presented to [facility] ED on 08/23/2024 with complaint of shortness breath and sharp chest pain along the ICD site as well as worsening s dyspnea on exertion and orthopnea, night sweats and generalized fatigue.  His workup revealed MSSA bacteremia, transthoracic echo show LVEF 28% no evidence of vegetations on aortic or mitral valve, tricuspid and pulmonic valve not well visualized.  CTA chest show no evidence of PE, patchy bilateral ground-glass opacities suspicious for infection or pulmonary edema.  He was evaluated by Internal Medicine, Cardiology and Infectious Disease, transitioned to antibiotic with oxacillin per sensitivities.  Also received few days of IV diuresis and shortness breath has improved  Transferred to [facility] today 08/27/2024 for cardiology/PERNELL evaluation.     Upon arrival he was afebrile and hemodynamically stable saturating well on room air, in sinus rhythm, report intermittent sharp chest pain.    Pernell was negative for vegetations.  Id was consulted, empiric antibiotics were continued.  Id suggested removal of ICD.  ICD was extracted and wound VAC was inserted.  Hypertensives were adjusted during hospital stay.  Doppler of right upper extremity obtained due to complaints of pain revealed cephalic vein superficial thrombosis.  Cultures confirmed as MSSA    Interval  Hx:     Afebrile.  Comfortably resting.  Pain is well controlled.  He had multiple questions regarding antibiotic therapy, future ICD placement, wound care, insurance approval.  All questions were answered to my ability      Objective/physical exam:  Vitals:    09/04/24 0449 09/04/24 0607 09/04/24 0755 09/04/24 0920   BP: (!) 145/92  (!) 158/95    BP Location:       Patient Position:       Pulse: 66  68    Resp:   18 20   Temp: 98.4 °F (36.9 °C)  98 °F (36.7 °C)    TempSrc: Oral  Oral    SpO2: 98%  99%    Weight:  104 kg (229 lb 4.5 oz)       General: In no acute distress, afebrile  Respiratory: Clear to auscultation bilaterally  Cardiovascular: S1, S2, no appreciable murmur  Abdomen: Soft, nontender, BS +  Skin: AICD site connected to wound VAC  Neurologic: Alert and oriented x4, moving all extremities with good strength     Lab Results   Component Value Date     (L) 09/03/2024    K 4.1 09/03/2024     09/03/2024    CO2 22 09/03/2024    BUN 8.5 09/03/2024    CREATININE 0.98 09/03/2024    CALCIUM 9.6 09/03/2024    ANIONGAP 8 07/27/2024    ESTGFRAFRICA 65 07/27/2024      Lab Results   Component Value Date    ALT 25 09/01/2024    AST 18 09/01/2024    ALKPHOS 91 09/01/2024    BILITOT 0.3 09/01/2024      Lab Results   Component Value Date    WBC 7.56 09/03/2024    HGB 14.5 09/03/2024    HCT 43.5 09/03/2024    MCV 89.5 09/03/2024     (H) 09/03/2024           Medications:   amiodarone  200 mg Oral Daily    amLODIPine  10 mg Oral Daily    atorvastatin  80 mg Oral Daily    carvediloL  6.25 mg Oral BID    clopidogreL  75 mg Oral Daily    enoxparin  40 mg Subcutaneous Q24H (prophylaxis, 1700)    hydrALAZINE  25 mg Oral Q8H    oxacillin IV (PEDS and ADULTS)  2 g Intravenous Q4H    sacubitriL-valsartan  1 tablet Oral BID        Current Facility-Administered Medications:     acetaminophen, 1,000 mg, Oral, Q6H PRN    aluminum-magnesium hydroxide-simethicone, 30 mL, Oral, QID PRN    bisacodyL, 10 mg, Rectal,  Daily PRN    dextromethorphan-guaiFENesin  mg/5 ml, 10 mL, Oral, Q6H PRN    hydrALAZINE, 10 mg, Intravenous, Q4H PRN    melatonin, 6 mg, Oral, Nightly PRN    ondansetron, 4 mg, Intravenous, Q4H PRN    oxyCODONE-acetaminophen, 1 tablet, Oral, Q6H PRN    prochlorperazine, 5 mg, Intravenous, Q6H PRN    senna-docusate 8.6-50 mg, 1 tablet, Oral, BID PRN    sodium chloride 0.9%, 10 mL, Intravenous, PRN    traMADoL, 50 mg, Oral, Q6H PRN     Assessment/Plan:    Sepsis secondary to MSSA bacteremia and ICD infection  S/p AICD extraction 08/30/2024 and wound VAC placement  Uncontrolled hypertension    HX:  Ischemic cardiomyopathy, HFrEF 20%, CAD s/p PCI, history of VFib s/p defibrillation and ICD placement 07/26/2024, HTN, HLD    Plan:   -all cultures confirmed as MSSA.  Continue oxacillin 2 g IV q.4.  ID following.  -continue wound VAC.  Wound care  -he needs close follow up with Cardiology for new ICD insertion after completion of antibiotics.  May need LifeVest in the meantime  -continue hydralazine, amlodipine, Coreg and Entresto at current dose.  Encourage p.o. intake, ambulation  -Continue amiodarone.  Plavix resumed  -patient does not have insurance.  will need home antibiotics at [place].   updated     Lovenox           Gonzales Alonso MD

## 2024-09-04 NOTE — NURSING
Nurses Note -- 4 Eyes      9/4/2024   5:54 PM      Skin assessed during: Daily Assessment      [x] No Altered Skin Integrity Present    [x]Prevention Measures Documented      [] Yes- Altered Skin Integrity Present or Discovered   [] LDA Added if Not in Epic (Describe Wound)   [] New Altered Skin Integrity was Present on Admit and Documented in LDA   [] Wound Image Taken    Wound Care Consulted? No    Attending Nurse:  Maddi Jimenez RN/Staff Member:

## 2024-09-05 PROCEDURE — 63600175 PHARM REV CODE 636 W HCPCS: Performed by: INTERNAL MEDICINE

## 2024-09-05 PROCEDURE — 36569 INSJ PICC 5 YR+ W/O IMAGING: CPT

## 2024-09-05 PROCEDURE — 11000001 HC ACUTE MED/SURG PRIVATE ROOM

## 2024-09-05 PROCEDURE — 25000003 PHARM REV CODE 250: Performed by: INTERNAL MEDICINE

## 2024-09-05 PROCEDURE — 25000003 PHARM REV CODE 250: Performed by: NURSE PRACTITIONER

## 2024-09-05 PROCEDURE — 25000003 PHARM REV CODE 250: Performed by: PHYSICIAN ASSISTANT

## 2024-09-05 PROCEDURE — 97605 NEG PRS WND THER DME<=50SQCM: CPT

## 2024-09-05 PROCEDURE — A4216 STERILE WATER/SALINE, 10 ML: HCPCS | Performed by: INTERNAL MEDICINE

## 2024-09-05 PROCEDURE — 63600175 PHARM REV CODE 636 W HCPCS: Performed by: NURSE PRACTITIONER

## 2024-09-05 PROCEDURE — C1751 CATH, INF, PER/CENT/MIDLINE: HCPCS

## 2024-09-05 PROCEDURE — 02HV33Z INSERTION OF INFUSION DEVICE INTO SUPERIOR VENA CAVA, PERCUTANEOUS APPROACH: ICD-10-PCS | Performed by: INTERNAL MEDICINE

## 2024-09-05 RX ORDER — CEFAZOLIN SODIUM 2 G/50ML
2 SOLUTION INTRAVENOUS
Status: DISCONTINUED | OUTPATIENT
Start: 2024-09-05 | End: 2024-09-10 | Stop reason: HOSPADM

## 2024-09-05 RX ORDER — SODIUM CHLORIDE 0.9 % (FLUSH) 0.9 %
10 SYRINGE (ML) INJECTION EVERY 6 HOURS
Status: DISCONTINUED | OUTPATIENT
Start: 2024-09-05 | End: 2024-09-10 | Stop reason: HOSPADM

## 2024-09-05 RX ORDER — SODIUM CHLORIDE 0.9 % (FLUSH) 0.9 %
10 SYRINGE (ML) INJECTION
Status: DISCONTINUED | OUTPATIENT
Start: 2024-09-05 | End: 2024-09-10 | Stop reason: HOSPADM

## 2024-09-05 RX ADMIN — CLOPIDOGREL BISULFATE 75 MG: 75 TABLET ORAL at 10:09

## 2024-09-05 RX ADMIN — OXYCODONE HYDROCHLORIDE AND ACETAMINOPHEN 1 TABLET: 5; 325 TABLET ORAL at 04:09

## 2024-09-05 RX ADMIN — ACETAMINOPHEN 1000 MG: 500 TABLET ORAL at 01:09

## 2024-09-05 RX ADMIN — OXYCODONE HYDROCHLORIDE AND ACETAMINOPHEN 1 TABLET: 5; 325 TABLET ORAL at 10:09

## 2024-09-05 RX ADMIN — OXACILLIN 2 G: 2 INJECTION, POWDER, FOR SOLUTION INTRAMUSCULAR; INTRAVENOUS at 10:09

## 2024-09-05 RX ADMIN — SACUBITRIL AND VALSARTAN 1 TABLET: 24; 26 TABLET, FILM COATED ORAL at 10:09

## 2024-09-05 RX ADMIN — HYDRALAZINE HYDROCHLORIDE 25 MG: 25 TABLET ORAL at 06:09

## 2024-09-05 RX ADMIN — ENOXAPARIN SODIUM 40 MG: 40 INJECTION SUBCUTANEOUS at 05:09

## 2024-09-05 RX ADMIN — OXYCODONE HYDROCHLORIDE AND ACETAMINOPHEN 1 TABLET: 5; 325 TABLET ORAL at 02:09

## 2024-09-05 RX ADMIN — AMIODARONE HYDROCHLORIDE 200 MG: 200 TABLET ORAL at 10:09

## 2024-09-05 RX ADMIN — ATORVASTATIN CALCIUM 80 MG: 40 TABLET, FILM COATED ORAL at 10:09

## 2024-09-05 RX ADMIN — HYDRALAZINE HYDROCHLORIDE 25 MG: 25 TABLET ORAL at 02:09

## 2024-09-05 RX ADMIN — OXACILLIN 2 G: 2 INJECTION, POWDER, FOR SOLUTION INTRAMUSCULAR; INTRAVENOUS at 02:09

## 2024-09-05 RX ADMIN — CARVEDILOL 6.25 MG: 3.12 TABLET, FILM COATED ORAL at 10:09

## 2024-09-05 RX ADMIN — SODIUM CHLORIDE, PRESERVATIVE FREE 10 ML: 5 INJECTION INTRAVENOUS at 06:09

## 2024-09-05 RX ADMIN — HYDRALAZINE HYDROCHLORIDE 25 MG: 25 TABLET ORAL at 10:09

## 2024-09-05 RX ADMIN — OXACILLIN 2 G: 2 INJECTION, POWDER, FOR SOLUTION INTRAMUSCULAR; INTRAVENOUS at 06:09

## 2024-09-05 RX ADMIN — AMLODIPINE BESYLATE 10 MG: 5 TABLET ORAL at 10:09

## 2024-09-05 RX ADMIN — Medication 6 MG: at 10:09
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
## 2024-09-05 NOTE — PROCEDURES
Eliseo Denson is a 54 y.o. male patient.    Temp: 98.6 °F (37 °C) (09/05/24 1448)  Pulse: 67 (09/05/24 1448)  Resp: 18 (09/05/24 1615)  BP: (!) 148/98 (09/05/24 1448)  SpO2: 96 % (09/05/24 1448)  Weight: 104 kg (229 lb 4.5 oz) (09/04/24 0607)    PICC  Date/Time: 9/5/2024 5:22 PM  Performed by: Kalina Maxwell RN  Consent Done: Yes  Time out: Immediately prior to procedure a time out was called to verify the correct patient, procedure, equipment, support staff and site/side marked as required  Indications: med administration and vascular access  Anesthesia: local infiltration  Local anesthetic: lidocaine 1% without epinephrine    Preparation: skin prepped with ChloraPrep  Skin prep agent dried: skin prep agent completely dried prior to procedure  Sterile barriers: all five maximum sterile barriers used - cap, mask, sterile gown, sterile gloves, and large sterile sheet  Hand hygiene: hand hygiene performed prior to central venous catheter insertion  Location details: right brachial  Catheter type: double lumen  Catheter size: 5 Fr  Catheter Length: 37cm    Ultrasound guidance: yes  Vessel Caliber: patent, compressibility normal  Needle advanced into vessel with real time Ultrasound guidance.  Guidewire confirmed in vessel.  Sterile sheath used.  Number of attempts: 1  Post-procedure: blood return through all ports, chlorhexidine patch and sterile dressing applied            Name ANGELINA Maxwell RN   9/5/2024

## 2024-09-05 NOTE — NURSING
Nurses Note -- 4 Eyes      9/5/2024   12:38 PM      Skin assessed during: Daily Assessment      [x] No Altered Skin Integrity Present    []Prevention Measures Documented      [] Yes- Altered Skin Integrity Present or Discovered   [] LDA Added if Not in Epic (Describe Wound)   [] New Altered Skin Integrity was Present on Admit and Documented in LDA   [] Wound Image Taken    Wound Care Consulted? No    Attending Nurse:  Marylou Jimenez RN/Staff Member:   Leticia

## 2024-09-05 NOTE — PROGRESS NOTES
[facility] 6th Floor Medical Telemetry  Wound Care    Patient Name:  Eliseo Denson   MRN:  [id number]  Date: 9/5/2024  Diagnosis: MSSA bacteremia    History:     Past Medical History:   Diagnosis Date    CAD (coronary artery disease)     CHF (congestive heart failure)     ICD (implantable cardioverter-defibrillator) in place     ICD (implantable cardioverter-defibrillator) infection     V-tach        Social History     Socioeconomic History    Marital status:      Social Determinants of Health     Financial Resource Strain: Medium Risk (8/28/2024)    Overall Financial Resource Strain (CARDIA)     Difficulty of Paying Living Expenses: Somewhat hard   Food Insecurity: No Food Insecurity (8/28/2024)    Hunger Vital Sign     Worried About Running Out of Food in the Last Year: Never true     Ran Out of Food in the Last Year: Never true   Transportation Needs: No Transportation Needs (8/28/2024)    TRANSPORTATION NEEDS     Transportation : No   Housing Stability: Low Risk  (8/28/2024)    Housing Stability Vital Sign     Unable to Pay for Housing in the Last Year: No     Homeless in the Last Year: No       Precautions:     Allergies as of 08/27/2024    (No Known Allergies)       WO Assessment Details/Treatment      09/05/24 1424   WOCN Assessment   Visit Date 09/05/24   Visit Time 1424   Consult Type New   WOCN Speciality Wound   WOCN List wound vac   Wound surgical   Procedure wound vac   Intervention chart review;assessed;applied   Teaching on-going        Wound 08/27/24 2000 Other (comment) Left upper Chest   Date First Assessed/Time First Assessed: 08/27/24 2000   Primary Wound Type: (c) Other (comment)  Side: Left  Orientation: upper  Location: Chest   Wound Image    Dressing Appearance Intact;Dry;Clean;other (see comments)  (NPWT)   Drainage Amount Scant   Drainage Characteristics/Odor Serosanguineous   Appearance Red;Moist   Tissue loss description Full thickness   Black (%), Wound  Tissue Color 0 %   Red (%), Wound Tissue Color 100 %   Yellow (%), Wound Tissue Color 0 %   Periwound Area Intact;Dry   Wound Edges Defined   Wound Length (cm) 1.2 cm   Wound Width (cm) 4.2 cm   Wound Depth (cm) 1 cm   Wound Volume (cm^3) 5.04 cm^3   Wound Surface Area (cm^2) 5.04 cm^2   Care Cleansed with:;Soap and water   Dressing Applied;Foam;Transparent film     WOCN follow up for wound vac change. Discussed plan of care with assigned nurse. Removed old dressing, cleansed area then reapplied new dressing. New photographs taken for EMR. Patient tolerated dressing change well. Reinforced education with patient and family, verbalized understanding. No further questions at this time. Will follow up.   09/05/2024

## 2024-09-05 NOTE — PROGRESS NOTES
[facility]  Hospital Medicine Progress Note        Chief Complaint: Inpatient Follow-up for     HPI: 54-year-old male with medical history of CAD/stent 2008, NSVT on amiodarone, ischemic cardiomyopathy, HTN, HLD, cardiologist Dr Arnulfo Bain who recently suffered NSTEMI and unstable VT on 7/24/2024 initially seen at [facility] ED and resuscitated/defibrillated and subsequently transferred to [facility], underwent LHC7/25/2024 by Dr. Hampton showing obstructive CAD that did not require intervention and underwent dual-chamber ICD (Biotronik) on 07/26/2024 by Dr Hussein and subsequently discharged home the next day.     Presented to [facility] ED on 08/23/2024 with complaint of shortness breath and sharp chest pain along the ICD site as well as worsening s dyspnea on exertion and orthopnea, night sweats and generalized fatigue.  His workup revealed MSSA bacteremia, transthoracic echo show LVEF 28% no evidence of vegetations on aortic or mitral valve, tricuspid and pulmonic valve not well visualized.  CTA chest show no evidence of PE, patchy bilateral ground-glass opacities suspicious for infection or pulmonary edema.  He was evaluated by Internal Medicine, Cardiology and Infectious Disease, transitioned to antibiotic with oxacillin per sensitivities.  Also received few days of IV diuresis and shortness breath has improved  Transferred to [facility] today 08/27/2024 for cardiology/PERNELL evaluation.     Upon arrival he was afebrile and hemodynamically stable saturating well on room air, in sinus rhythm, report intermittent sharp chest pain.     Pernell was negative for vegetations.  Id was consulted, empiric antibiotics were continued.  Id suggested removal of ICD.  ICD was extracted and wound VAC was inserted.  Hypertensives were adjusted during hospital stay.  Doppler of right upper extremity obtained due to complaints of pain revealed cephalic vein superficial thrombosis.  Cultures confirmed as MSSA cultures from the ICD  tip is growing MSSA also wound cultures are growing MSSA patient continues to be on oxacillin id consulted    Interval Hx:   Patient seen and examined this morning appears frustrated as he wants to know the game plan  Case was discussed with patient's nurse and  on the floor.    Objective/physical exam:  General: In no acute distress, afebrile  Chest: Clear to auscultation bilaterally  Heart: RRR, +S1, S2, no appreciable murmur  Abdomen: Soft, nontender, BS +  MSK: Warm, no lower extremity edema, no clubbing or cyanosis  Neurologic: Alert and oriented x4,   VITAL SIGNS: 24 HRS MIN & MAX LAST   Temp  Min: 98.1 °F (36.7 °C)  Max: 98.7 °F (37.1 °C) 98.3 °F (36.8 °C)   BP  Min: 124/82  Max: 138/95 124/82   Pulse  Min: 54  Max: 70  70   Resp  Min: 18  Max: 20 20   SpO2  Min: 95 %  Max: 96 % 95 %     I have reviewed the following labs:  Recent Labs   Lab 08/30/24 0445 09/01/24  0945 09/03/24  0531   WBC 8.04 5.91 7.56   RBC 4.94 4.66* 4.86   HGB 15.2 14.0 14.5   HCT 45.3 42.9 43.5   MCV 91.7 92.1 89.5   MCH 30.8 30.0 29.8   MCHC 33.6 32.6* 33.3   RDW 13.2 13.2 13.2   * 583* 593*   MPV 9.4 9.1 8.5     Recent Labs   Lab 08/30/24 0445 08/31/24  0337 09/01/24  0945 09/03/24  0531   * 133* 138 135*   K 4.8 5.2* 4.4 4.1    103 104 104   CO2 23 22 25 22   BUN 10.0 12.3 8.6 8.5   CREATININE 1.01 1.02 1.06 0.98   CALCIUM 9.1 8.9 9.2 9.6   MG 1.90 2.10 2.00 1.90   ALBUMIN 2.9*  --  2.6*  --    ALKPHOS 101  --  91  --    ALT 41  --  25  --    AST 32  --  18  --    BILITOT 0.4  --  0.3  --      Microbiology Results (last 7 days)       Procedure Component Value Units Date/Time    Medical Device Culture [[id number]]  (Abnormal)  (Susceptibility) Collected: 08/30/24 1209    Order Status: Completed Specimen: Foreign Body, Specify Object Updated: 09/02/24 1147     CULTURE, MEDICAL DEVICE ([facility]) More than 100 CFU/mL Methicillin Sensitive Staphylococcus aureus    Wound Culture [[id number]]  (Abnormal)   (Susceptibility) Collected: 08/30/24 1211    Order Status: Completed Specimen: Wound from Chest, Left Updated: 09/02/24 1146     Wound Culture Few Methicillin Sensitive Staphylococcus aureus    Anaerobic Culture [[id number]] Collected: 08/30/24 1211    Order Status: Completed Specimen: Wound from Chest, Left Updated: 09/02/24 0737     Anaerobe Culture No Anaerobes Isolated    Gram Stain [[id number]] Collected: 08/30/24 1211    Order Status: Completed Specimen: Wound from Chest, Left Updated: 08/30/24 1402     GRAM STAIN Few WBC observed      Few Gram positive cocci    Fungal Culture [[id number]] Collected: 08/30/24 1211    Order Status: Sent Specimen: Wound from Chest, Left Updated: 08/30/24 1234             See below for Radiology    Assessment/Plan:  Sepsis secondary to MSSA bacteremia and ICD infection  S/p AICD extraction 08/30/2024 and wound VAC placement  Uncontrolled hypertension     HX:  Ischemic cardiomyopathy, HFrEF 20%, CAD s/p PCI, history of VFib s/p defibrillation and ICD placement 07/26/2024, HTN, HLD     Plan:   Continue with oxacillin 2 g IV q.4 awaiting ID recommendations to see for how long we need to continue antibiotics  Continue with wound VAC CT surgery following wound care following   Case management is working on LifeVest  continue hydralazine, amlodipine, Coreg and Entresto at current dose.  Encourage p.o. intake, ambulation  Continue amiodarone.  Plavix resumed  patient does not have insurance.  Setting up outpatient antibiotics will be a problem  aware    Prophylaxis: Lovenox         VTE prophylaxis:     Patient condition:  Stable/Fair/Guarded/ Serious/ Critical    Anticipated discharge and Disposition:         All diagnosis and differential diagnosis have been reviewed; assessment and plan has been documented; I have personally reviewed the labs and test results that are presently available; I have reviewed the patients medication list; I have reviewed the consulting  providers response and recommendations. I have reviewed or attempted to review medical records based upon their availability    All of the patient's questions have been  addressed and answered. Patient's is agreeable to the above stated plan. I will continue to monitor closely and make adjustments to medical management as needed.    Portions of this note dictated using EMR integrated voice recognition software, and may be subject to voice recognition errors not corrected at proofreading. Please contact writer for clarification if needed.   _____________________________________________________________________    Malnutrition Status:    Scheduled Med:   amiodarone  200 mg Oral Daily    amLODIPine  10 mg Oral Daily    atorvastatin  80 mg Oral Daily    carvediloL  6.25 mg Oral BID    clopidogreL  75 mg Oral Daily    enoxparin  40 mg Subcutaneous Q24H (prophylaxis, 1700)    hydrALAZINE  25 mg Oral Q8H    oxacillin IV (PEDS and ADULTS)  2 g Intravenous Q4H    sacubitriL-valsartan  1 tablet Oral BID      Continuous Infusions:     PRN Meds:    Current Facility-Administered Medications:     acetaminophen, 1,000 mg, Oral, Q6H PRN    aluminum-magnesium hydroxide-simethicone, 30 mL, Oral, QID PRN    bisacodyL, 10 mg, Rectal, Daily PRN    dextromethorphan-guaiFENesin  mg/5 ml, 10 mL, Oral, Q6H PRN    hydrALAZINE, 10 mg, Intravenous, Q4H PRN    melatonin, 6 mg, Oral, Nightly PRN    ondansetron, 4 mg, Intravenous, Q4H PRN    oxyCODONE-acetaminophen, 1 tablet, Oral, Q6H PRN    prochlorperazine, 5 mg, Intravenous, Q6H PRN    senna-docusate 8.6-50 mg, 1 tablet, Oral, BID PRN    sodium chloride 0.9%, 10 mL, Intravenous, PRN    traMADoL, 50 mg, Oral, Q6H PRN     Radiology:  I have personally reviewed the following imaging and agree with the radiologist.     Electrophysiology Procedure  Procedure performed in the Invasive Lab    - See Procedure Log link below for nursing documentation    - See OpNote on Surgeries Tab for physician  findings    - See Imaging Tab for radiologist dictation      Lauren Morales MD  Department of Hospital Medicine   [facility]   09/05/2024

## 2024-09-06 LAB
ANION GAP SERPL CALC-SCNC: 8 MEQ/L
BASOPHILS # BLD AUTO: 0.04 X10(3)/MCL
BASOPHILS NFR BLD AUTO: 0.6 %
BUN SERPL-MCNC: 10.2 MG/DL (ref 8.4–25.7)
CALCIUM SERPL-MCNC: 9.5 MG/DL (ref 8.4–10.2)
CHLORIDE SERPL-SCNC: 103 MMOL/L (ref 98–107)
CO2 SERPL-SCNC: 24 MMOL/L (ref 22–29)
CREAT SERPL-MCNC: 1.14 MG/DL (ref 0.73–1.18)
CREAT/UREA NIT SERPL: 9
EOSINOPHIL # BLD AUTO: 0.24 X10(3)/MCL (ref 0–0.9)
EOSINOPHIL NFR BLD AUTO: 3.8 %
ERYTHROCYTE [DISTWIDTH] IN BLOOD BY AUTOMATED COUNT: 13.2 % (ref 11.5–17)
GFR SERPLBLD CREATININE-BSD FMLA CKD-EPI: >60 ML/MIN/1.73/M2
GLUCOSE SERPL-MCNC: 135 MG/DL (ref 74–100)
HCT VFR BLD AUTO: 42.7 % (ref 42–52)
HGB BLD-MCNC: 14.2 G/DL (ref 14–18)
IMM GRANULOCYTES # BLD AUTO: 0.02 X10(3)/MCL (ref 0–0.04)
IMM GRANULOCYTES NFR BLD AUTO: 0.3 %
LYMPHOCYTES # BLD AUTO: 1.23 X10(3)/MCL (ref 0.6–4.6)
LYMPHOCYTES NFR BLD AUTO: 19.3 %
MCH RBC QN AUTO: 30.5 PG (ref 27–31)
MCHC RBC AUTO-ENTMCNC: 33.3 G/DL (ref 33–36)
MCV RBC AUTO: 91.6 FL (ref 80–94)
MONOCYTES # BLD AUTO: 0.64 X10(3)/MCL (ref 0.1–1.3)
MONOCYTES NFR BLD AUTO: 10 %
NEUTROPHILS # BLD AUTO: 4.2 X10(3)/MCL (ref 2.1–9.2)
NEUTROPHILS NFR BLD AUTO: 66 %
NRBC BLD AUTO-RTO: 0 %
PLATELET # BLD AUTO: 614 X10(3)/MCL (ref 130–400)
PMV BLD AUTO: 8.7 FL (ref 7.4–10.4)
POTASSIUM SERPL-SCNC: 5 MMOL/L (ref 3.5–5.1)
RBC # BLD AUTO: 4.66 X10(6)/MCL (ref 4.7–6.1)
SODIUM SERPL-SCNC: 135 MMOL/L (ref 136–145)
WBC # BLD AUTO: 6.37 X10(3)/MCL (ref 4.5–11.5)

## 2024-09-06 PROCEDURE — 63600175 PHARM REV CODE 636 W HCPCS: Performed by: INTERNAL MEDICINE

## 2024-09-06 PROCEDURE — A4216 STERILE WATER/SALINE, 10 ML: HCPCS | Performed by: INTERNAL MEDICINE

## 2024-09-06 PROCEDURE — 25000003 PHARM REV CODE 250: Performed by: NURSE PRACTITIONER

## 2024-09-06 PROCEDURE — 25000003 PHARM REV CODE 250: Performed by: PHYSICIAN ASSISTANT

## 2024-09-06 PROCEDURE — 36415 COLL VENOUS BLD VENIPUNCTURE: CPT | Performed by: INTERNAL MEDICINE

## 2024-09-06 PROCEDURE — 25000003 PHARM REV CODE 250: Performed by: INTERNAL MEDICINE

## 2024-09-06 PROCEDURE — 80048 BASIC METABOLIC PNL TOTAL CA: CPT | Performed by: INTERNAL MEDICINE

## 2024-09-06 PROCEDURE — 85025 COMPLETE CBC W/AUTO DIFF WBC: CPT | Performed by: INTERNAL MEDICINE

## 2024-09-06 PROCEDURE — 11000001 HC ACUTE MED/SURG PRIVATE ROOM

## 2024-09-06 RX ORDER — CEPHALEXIN 500 MG/1
500 CAPSULE ORAL EVERY 8 HOURS
Qty: 42 CAPSULE | Refills: 0 | Status: SHIPPED | OUTPATIENT
Start: 2024-09-06 | End: 2024-09-10 | Stop reason: HOSPADM

## 2024-09-06 RX ADMIN — SODIUM CHLORIDE, PRESERVATIVE FREE 10 ML: 5 INJECTION INTRAVENOUS at 10:09

## 2024-09-06 RX ADMIN — OXYCODONE HYDROCHLORIDE AND ACETAMINOPHEN 1 TABLET: 5; 325 TABLET ORAL at 10:09

## 2024-09-06 RX ADMIN — SODIUM CHLORIDE, PRESERVATIVE FREE 10 ML: 5 INJECTION INTRAVENOUS at 12:09

## 2024-09-06 RX ADMIN — Medication 6 MG: at 10:09

## 2024-09-06 RX ADMIN — HYDRALAZINE HYDROCHLORIDE 25 MG: 25 TABLET ORAL at 06:09

## 2024-09-06 RX ADMIN — OXYCODONE HYDROCHLORIDE AND ACETAMINOPHEN 1 TABLET: 5; 325 TABLET ORAL at 04:09

## 2024-09-06 RX ADMIN — AMIODARONE HYDROCHLORIDE 200 MG: 200 TABLET ORAL at 09:09

## 2024-09-06 RX ADMIN — HYDRALAZINE HYDROCHLORIDE 25 MG: 25 TABLET ORAL at 01:09

## 2024-09-06 RX ADMIN — AMLODIPINE BESYLATE 10 MG: 5 TABLET ORAL at 09:09

## 2024-09-06 RX ADMIN — SODIUM CHLORIDE, PRESERVATIVE FREE 10 ML: 5 INJECTION INTRAVENOUS at 08:09

## 2024-09-06 RX ADMIN — SODIUM CHLORIDE, PRESERVATIVE FREE 10 ML: 5 INJECTION INTRAVENOUS at 11:09

## 2024-09-06 RX ADMIN — CEFAZOLIN SODIUM 2 G: 2 SOLUTION INTRAVENOUS at 10:09

## 2024-09-06 RX ADMIN — ENOXAPARIN SODIUM 40 MG: 40 INJECTION SUBCUTANEOUS at 04:09

## 2024-09-06 RX ADMIN — CARVEDILOL 6.25 MG: 3.12 TABLET, FILM COATED ORAL at 09:09

## 2024-09-06 RX ADMIN — SODIUM CHLORIDE, PRESERVATIVE FREE 10 ML: 5 INJECTION INTRAVENOUS at 06:09

## 2024-09-06 RX ADMIN — CEFAZOLIN SODIUM 2 G: 2 SOLUTION INTRAVENOUS at 08:09

## 2024-09-06 RX ADMIN — ATORVASTATIN CALCIUM 80 MG: 40 TABLET, FILM COATED ORAL at 09:09

## 2024-09-06 RX ADMIN — SACUBITRIL AND VALSARTAN 1 TABLET: 24; 26 TABLET, FILM COATED ORAL at 08:09

## 2024-09-06 RX ADMIN — HYDRALAZINE HYDROCHLORIDE 25 MG: 25 TABLET ORAL at 10:09

## 2024-09-06 RX ADMIN — SACUBITRIL AND VALSARTAN 1 TABLET: 24; 26 TABLET, FILM COATED ORAL at 09:09

## 2024-09-06 RX ADMIN — CARVEDILOL 6.25 MG: 3.12 TABLET, FILM COATED ORAL at 08:09

## 2024-09-06 RX ADMIN — CLOPIDOGREL BISULFATE 75 MG: 75 TABLET ORAL at 09:09

## 2024-09-06 RX ADMIN — CEFAZOLIN SODIUM 2 G: 2 SOLUTION INTRAVENOUS at 03:09

## 2024-09-06 NOTE — NURSING
Nurses Note -- 4 Eyes      9/6/2024   10:10 AM      Skin assessed during: Daily Assessment      [x] No Altered Skin Integrity Present    []Prevention Measures Documented      [] Yes- Altered Skin Integrity Present or Discovered   [] LDA Added if Not in Epic (Describe Wound)   [] New Altered Skin Integrity was Present on Admit and Documented in LDA   [] Wound Image Taken    Wound Care Consulted? No    Attending Nurse:  Marylou Jimenez RN/Staff Member:  Leticia

## 2024-09-06 NOTE — PLAN OF CARE
Patient working on raising money for Lifevest. Referral for  IV antibiotics sent to [facility] via [facility]. Per Luis Manuel, med and supplies will cost $32.12/day for med and supplies. Patient can set up a payment plan with a credit card on file. Medicaid home health referral will go to [facility]. Patient is in agreement. Referral sent via [employer].  [facility] and the [facility] referral sent via [employer].  Out of pocket cost for Kflex is $8.27.  Per FRANCISCO Gaffney, discontinue wound vac at discharge. Wound care ordered.

## 2024-09-06 NOTE — PROGRESS NOTES
[facility]  Hospital Medicine Progress Note        Chief Complaint: Inpatient Follow-up for     HPI: 54-year-old male with medical history of CAD/stent 2008, NSVT on amiodarone, ischemic cardiomyopathy, HTN, HLD, cardiologist Dr Arnulfo Bain who recently suffered NSTEMI and unstable VT on 7/24/2024 initially seen at [facility] ED and resuscitated/defibrillated and subsequently transferred to [facility], underwent LHC7/25/2024 by Dr. Hampton showing obstructive CAD that did not require intervention and underwent dual-chamber ICD (Biotronik) on 07/26/2024 by Dr Hussein and subsequently discharged home the next day.     Presented to [facility] ED on 08/23/2024 with complaint of shortness breath and sharp chest pain along the ICD site as well as worsening s dyspnea on exertion and orthopnea, night sweats and generalized fatigue.  His workup revealed MSSA bacteremia, transthoracic echo show LVEF 28% no evidence of vegetations on aortic or mitral valve, tricuspid and pulmonic valve not well visualized.  CTA chest show no evidence of PE, patchy bilateral ground-glass opacities suspicious for infection or pulmonary edema.  He was evaluated by Internal Medicine, Cardiology and Infectious Disease, transitioned to antibiotic with oxacillin per sensitivities.  Also received few days of IV diuresis and shortness breath has improved  Transferred to [facility] today 08/27/2024 for cardiology/PERNELL evaluation.     Upon arrival he was afebrile and hemodynamically stable saturating well on room air, in sinus rhythm, report intermittent sharp chest pain.     Pernell was negative for vegetations.  Id was consulted, empiric antibiotics were continued.  Id suggested removal of ICD.  ICD was extracted and wound VAC was inserted.  Hypertensives were adjusted during hospital stay.  Doppler of right upper extremity obtained due to complaints of pain revealed cephalic vein superficial thrombosis.  Cultures confirmed as MSSA cultures from the ICD  tip is growing MSSA also wound cultures are growing MSSA patient continues to be on oxacillin id consulted    Interval Hx:   Patient seen and examined this morning with family bedside denied any complaints  Objective/physical exam:  General: In no acute distress, afebrile  Chest: Clear to auscultation bilaterally  Heart: RRR, +S1, S2, no appreciable murmur  Abdomen: Soft, nontender, BS +  MSK: Warm, no lower extremity edema, no clubbing or cyanosis  Neurologic: Alert and oriented x4,   VITAL SIGNS: 24 HRS MIN & MAX LAST   Temp  Min: 98 °F (36.7 °C)  Max: 98.6 °F (37 °C) 98.1 °F (36.7 °C)   BP  Min: 119/74  Max: 148/98 (!) 141/92   Pulse  Min: 60  Max: 78  70   Resp  Min: 16  Max: 18 16   SpO2  Min: 95 %  Max: 98 % 95 %     I have reviewed the following labs:  Recent Labs   Lab 09/01/24  0945 09/03/24  0531 09/06/24  0633   WBC 5.91 7.56 6.37   RBC 4.66* 4.86 4.66*   HGB 14.0 14.5 14.2   HCT 42.9 43.5 42.7   MCV 92.1 89.5 91.6   MCH 30.0 29.8 30.5   MCHC 32.6* 33.3 33.3   RDW 13.2 13.2 13.2   * 593* 614*   MPV 9.1 8.5 8.7     Recent Labs   Lab 08/31/24  0337 09/01/24  0945 09/03/24  0531 09/06/24  0633   * 138 135* 135*   K 5.2* 4.4 4.1 5.0    104 104 103   CO2 22 25 22 24   BUN 12.3 8.6 8.5 10.2   CREATININE 1.02 1.06 0.98 1.14   CALCIUM 8.9 9.2 9.6 9.5   MG 2.10 2.00 1.90  --    ALBUMIN  --  2.6*  --   --    ALKPHOS  --  91  --   --    ALT  --  25  --   --    AST  --  18  --   --    BILITOT  --  0.3  --   --      Microbiology Results (last 7 days)       Procedure Component Value Units Date/Time    Medical Device Culture [[id number]]  (Abnormal)  (Susceptibility) Collected: 08/30/24 1209    Order Status: Completed Specimen: Foreign Body, Specify Object Updated: 09/02/24 1147     CULTURE, MEDICAL DEVICE ([facility]) More than 100 CFU/mL Methicillin Sensitive Staphylococcus aureus    Wound Culture [[id number]]  (Abnormal)  (Susceptibility) Collected: 08/30/24 1211    Order Status: Completed Specimen: Wound  from Chest, Left Updated: 09/02/24 1146     Wound Culture Few Methicillin Sensitive Staphylococcus aureus    Anaerobic Culture [[id number]] Collected: 08/30/24 1211    Order Status: Completed Specimen: Wound from Chest, Left Updated: 09/02/24 0737     Anaerobe Culture No Anaerobes Isolated             See below for Radiology    Assessment/Plan:  Sepsis secondary to MSSA bacteremia and ICD infection  S/p AICD extraction 08/30/2024 and wound VAC placement  Uncontrolled hypertension     HX:  Ischemic cardiomyopathy, HFrEF 20%, CAD s/p PCI, history of VFib s/p defibrillation and ICD placement 07/26/2024, HTN, HLD     Plan:   Antibiotics were switched to Ancef as per ID and they give him the option of 2 weeks of IV antibiotics and then switching to 2 weeks of p.o. antibiotics or 4 weeks of IV antibiotics patient is still undecided  Continue with wound VAC CT surgery following wound care following   Case management is working on LifeVest  continue hydralazine, amlodipine, Coreg and Entresto at current dose.  Encourage p.o. intake, ambulation  Continue amiodarone.  Plavix resumed    Case Management requested me to send the prescription of p.o. antibiotics to see what will be the cost and accordingly with the patient will decide which route he wants to go with    Either 2 weeks of IV +2 weeks of p.o. or 4 weeks of IV    Case management is also working on LifeVest    Discharge either later today versus tomorrow once patient makes the decision and antibiotics have been arranged      Prophylaxis: Lovenox         VTE prophylaxis:     Patient condition:  Stable/Fair/Guarded/ Serious/ Critical    Anticipated discharge and Disposition:         All diagnosis and differential diagnosis have been reviewed; assessment and plan has been documented; I have personally reviewed the labs and test results that are presently available; I have reviewed the patients medication list; I have reviewed the consulting providers response and  recommendations. I have reviewed or attempted to review medical records based upon their availability    All of the patient's questions have been  addressed and answered. Patient's is agreeable to the above stated plan. I will continue to monitor closely and make adjustments to medical management as needed.    Portions of this note dictated using EMR integrated voice recognition software, and may be subject to voice recognition errors not corrected at proofreading. Please contact writer for clarification if needed.   _____________________________________________________________________    Malnutrition Status:    Scheduled Med:   amiodarone  200 mg Oral Daily    amLODIPine  10 mg Oral Daily    atorvastatin  80 mg Oral Daily    carvediloL  6.25 mg Oral BID    ceFAZolin (Ancef) IV (PEDS and ADULTS)  2 g Intravenous Q8H    clopidogreL  75 mg Oral Daily    enoxparin  40 mg Subcutaneous Q24H (prophylaxis, 1700)    hydrALAZINE  25 mg Oral Q8H    sacubitriL-valsartan  1 tablet Oral BID    sodium chloride 0.9%  10 mL Intravenous Q6H      Continuous Infusions:     PRN Meds:    Current Facility-Administered Medications:     acetaminophen, 1,000 mg, Oral, Q6H PRN    aluminum-magnesium hydroxide-simethicone, 30 mL, Oral, QID PRN    bisacodyL, 10 mg, Rectal, Daily PRN    dextromethorphan-guaiFENesin  mg/5 ml, 10 mL, Oral, Q6H PRN    hydrALAZINE, 10 mg, Intravenous, Q4H PRN    melatonin, 6 mg, Oral, Nightly PRN    ondansetron, 4 mg, Intravenous, Q4H PRN    oxyCODONE-acetaminophen, 1 tablet, Oral, Q6H PRN    prochlorperazine, 5 mg, Intravenous, Q6H PRN    senna-docusate 8.6-50 mg, 1 tablet, Oral, BID PRN    sodium chloride 0.9%, 10 mL, Intravenous, PRN    Flushing PICC/Midline Protocol, , , Until Discontinued **AND** sodium chloride 0.9%, 10 mL, Intravenous, Q6H **AND** sodium chloride 0.9%, 10 mL, Intravenous, PRN    traMADoL, 50 mg, Oral, Q6H PRN     Radiology:  I have personally reviewed the following imaging and agree  with the radiologist.     X-Ray Chest 1 View for Line/Tube Placement  Narrative: EXAMINATION:  XR CHEST 1 VIEW FOR LINE/TUBE PLACEMENT    CLINICAL HISTORY:  picc placement;    COMPARISON:  23 August 2024    FINDINGS:  Frontal view of the chest was obtained. Right PICC tip overlies the distal SVC.  The heart is not enlarged.  Lungs are clear.  There is no pneumothorax or significant effusion.  Impression: Right PICC tip overlies the distal SVC.    Electronically signed by: Carlos Georges  Date:    09/05/2024  Time:    17:31      Lauren Morales MD  Department of Hospital Medicine   [facility]   09/06/2024

## 2024-09-07 PROCEDURE — 25000003 PHARM REV CODE 250: Performed by: INTERNAL MEDICINE

## 2024-09-07 PROCEDURE — 25000003 PHARM REV CODE 250: Performed by: NURSE PRACTITIONER

## 2024-09-07 PROCEDURE — 63600175 PHARM REV CODE 636 W HCPCS: Performed by: INTERNAL MEDICINE

## 2024-09-07 PROCEDURE — 11000001 HC ACUTE MED/SURG PRIVATE ROOM

## 2024-09-07 PROCEDURE — A4216 STERILE WATER/SALINE, 10 ML: HCPCS | Performed by: INTERNAL MEDICINE

## 2024-09-07 PROCEDURE — 25000003 PHARM REV CODE 250: Performed by: PHYSICIAN ASSISTANT

## 2024-09-07 RX ADMIN — SODIUM CHLORIDE, PRESERVATIVE FREE 10 ML: 5 INJECTION INTRAVENOUS at 06:09

## 2024-09-07 RX ADMIN — AMIODARONE HYDROCHLORIDE 200 MG: 200 TABLET ORAL at 10:09

## 2024-09-07 RX ADMIN — SODIUM CHLORIDE, PRESERVATIVE FREE 10 ML: 5 INJECTION INTRAVENOUS at 05:09

## 2024-09-07 RX ADMIN — HYDRALAZINE HYDROCHLORIDE 25 MG: 25 TABLET ORAL at 05:09

## 2024-09-07 RX ADMIN — SACUBITRIL AND VALSARTAN 1 TABLET: 24; 26 TABLET, FILM COATED ORAL at 09:09

## 2024-09-07 RX ADMIN — CEFAZOLIN SODIUM 2 G: 2 SOLUTION INTRAVENOUS at 10:09

## 2024-09-07 RX ADMIN — CEFAZOLIN SODIUM 2 G: 2 SOLUTION INTRAVENOUS at 02:09

## 2024-09-07 RX ADMIN — OXYCODONE HYDROCHLORIDE AND ACETAMINOPHEN 1 TABLET: 5; 325 TABLET ORAL at 05:09

## 2024-09-07 RX ADMIN — Medication 6 MG: at 09:09

## 2024-09-07 RX ADMIN — CEFAZOLIN SODIUM 2 G: 2 SOLUTION INTRAVENOUS at 05:09

## 2024-09-07 RX ADMIN — CLOPIDOGREL BISULFATE 75 MG: 75 TABLET ORAL at 10:09

## 2024-09-07 RX ADMIN — SACUBITRIL AND VALSARTAN 1 TABLET: 24; 26 TABLET, FILM COATED ORAL at 10:09

## 2024-09-07 RX ADMIN — CARVEDILOL 6.25 MG: 3.12 TABLET, FILM COATED ORAL at 10:09

## 2024-09-07 RX ADMIN — CARVEDILOL 6.25 MG: 3.12 TABLET, FILM COATED ORAL at 09:09

## 2024-09-07 RX ADMIN — ENOXAPARIN SODIUM 40 MG: 40 INJECTION SUBCUTANEOUS at 05:09

## 2024-09-07 RX ADMIN — ATORVASTATIN CALCIUM 80 MG: 40 TABLET, FILM COATED ORAL at 10:09

## 2024-09-07 RX ADMIN — OXYCODONE HYDROCHLORIDE AND ACETAMINOPHEN 1 TABLET: 5; 325 TABLET ORAL at 10:09

## 2024-09-07 RX ADMIN — SODIUM CHLORIDE, PRESERVATIVE FREE 10 ML: 5 INJECTION INTRAVENOUS at 12:09

## 2024-09-07 RX ADMIN — OXYCODONE HYDROCHLORIDE AND ACETAMINOPHEN 1 TABLET: 5; 325 TABLET ORAL at 09:09

## 2024-09-07 RX ADMIN — HYDRALAZINE HYDROCHLORIDE 25 MG: 25 TABLET ORAL at 09:09

## 2024-09-07 RX ADMIN — AMLODIPINE BESYLATE 10 MG: 5 TABLET ORAL at 10:09

## 2024-09-07 NOTE — NURSING
Nurses Note -- 4 Eyes      9/6/2024   7:49 PM      Skin assessed during: Q Shift Change      [x] No Altered Skin Integrity Present    []Prevention Measures Documented      [] Yes- Altered Skin Integrity Present or Discovered   [] LDA Added if Not in Epic (Describe Wound)   [] New Altered Skin Integrity was Present on Admit and Documented in LDA   [] Wound Image Taken    Wound Care Consulted? No    Attending Nurse:  Kadi Jimenez RN/Staff Member:   Marylou

## 2024-09-07 NOTE — PROGRESS NOTES
[facility]  Hospital Medicine Progress Note        Chief Complaint: Inpatient Follow-up for     HPI: 54-year-old male with medical history of CAD/stent 2008, NSVT on amiodarone, ischemic cardiomyopathy, HTN, HLD, cardiologist Dr Arnulfo Bain who recently suffered NSTEMI and unstable VT on 7/24/2024 initially seen at [facility] ED and resuscitated/defibrillated and subsequently transferred to [facility], underwent LHC7/25/2024 by Dr. Hampton showing obstructive CAD that did not require intervention and underwent dual-chamber ICD (Biotronik) on 07/26/2024 by Dr Hussein and subsequently discharged home the next day.     Presented to [facility] ED on 08/23/2024 with complaint of shortness breath and sharp chest pain along the ICD site as well as worsening s dyspnea on exertion and orthopnea, night sweats and generalized fatigue.  His workup revealed MSSA bacteremia, transthoracic echo show LVEF 28% no evidence of vegetations on aortic or mitral valve, tricuspid and pulmonic valve not well visualized.  CTA chest show no evidence of PE, patchy bilateral ground-glass opacities suspicious for infection or pulmonary edema.  He was evaluated by Internal Medicine, Cardiology and Infectious Disease, transitioned to antibiotic with oxacillin per sensitivities.  Also received few days of IV diuresis and shortness breath has improved  Transferred to [facility] today 08/27/2024 for cardiology/PERNELL evaluation.     Upon arrival he was afebrile and hemodynamically stable saturating well on room air, in sinus rhythm, report intermittent sharp chest pain.     Pernell was negative for vegetations.  Id was consulted, empiric antibiotics were continued.  Id suggested removal of ICD.  ICD was extracted and wound VAC was inserted.  Hypertensives were adjusted during hospital stay.  Doppler of right upper extremity obtained due to complaints of pain revealed cephalic vein superficial thrombosis.  Cultures confirmed as MSSA cultures from the ICD  tip is growing MSSA also wound cultures are growing MSSA patient continues to be on oxacillin id consulted    Interval Hx:   Patient seen and examined this morning with family bedside denied any complaints  Objective/physical exam:  General: In no acute distress, afebrile  Chest: Clear to auscultation bilaterally  Heart: RRR, +S1, S2, no appreciable murmur  Abdomen: Soft, nontender, BS +  MSK: Warm, no lower extremity edema, no clubbing or cyanosis  Neurologic: Alert and oriented x4,   VITAL SIGNS: 24 HRS MIN & MAX LAST   Temp  Min: 98.1 °F (36.7 °C)  Max: 98.7 °F (37.1 °C) 98.1 °F (36.7 °C)   BP  Min: 126/77  Max: 151/92 (!) 151/92   Pulse  Min: 59  Max: 73  (!) 59   Resp  Min: 16  Max: 20 18   SpO2  Min: 95 %  Max: 99 % 99 %     I have reviewed the following labs:  Recent Labs   Lab 09/01/24 0945 09/03/24  0531 09/06/24  0633   WBC 5.91 7.56 6.37   RBC 4.66* 4.86 4.66*   HGB 14.0 14.5 14.2   HCT 42.9 43.5 42.7   MCV 92.1 89.5 91.6   MCH 30.0 29.8 30.5   MCHC 32.6* 33.3 33.3   RDW 13.2 13.2 13.2   * 593* 614*   MPV 9.1 8.5 8.7     Recent Labs   Lab 09/01/24 0945 09/03/24  0531 09/06/24  0633    135* 135*   K 4.4 4.1 5.0    104 103   CO2 25 22 24   BUN 8.6 8.5 10.2   CREATININE 1.06 0.98 1.14   CALCIUM 9.2 9.6 9.5   MG 2.00 1.90  --    ALBUMIN 2.6*  --   --    ALKPHOS 91  --   --    ALT 25  --   --    AST 18  --   --    BILITOT 0.3  --   --      Microbiology Results (last 7 days)       Procedure Component Value Units Date/Time    Medical Device Culture [[id number]]  (Abnormal)  (Susceptibility) Collected: 08/30/24 1209    Order Status: Completed Specimen: Foreign Body, Specify Object Updated: 09/02/24 1147     CULTURE, MEDICAL DEVICE ([facility]) More than 100 CFU/mL Methicillin Sensitive Staphylococcus aureus    Wound Culture [[id number]]  (Abnormal)  (Susceptibility) Collected: 08/30/24 1211    Order Status: Completed Specimen: Wound from Chest, Left Updated: 09/02/24 1146     Wound Culture Few  Methicillin Sensitive Staphylococcus aureus    Anaerobic Culture [[id number]] Collected: 08/30/24 1211    Order Status: Completed Specimen: Wound from Chest, Left Updated: 09/02/24 0737     Anaerobe Culture No Anaerobes Isolated             See below for Radiology    Assessment/Plan:  Sepsis secondary to MSSA bacteremia and ICD infection  S/p AICD extraction 08/30/2024 and wound VAC placement  Uncontrolled hypertension     HX:  Ischemic cardiomyopathy, HFrEF 20%, CAD s/p PCI, history of VFib s/p defibrillation and ICD placement 07/26/2024, HTN, HLD     Plan:   Patient wants to go with the option of IV antibiotics for total of 2 weeks and followed by 2 weeks of p.o. antibiotics as recommended by ID  But patient apparently can not afford IV medications so we will try to transfer this patient to [facility] to complete the antibiotic course will do a PFC transfer  We have arranged for LifeVest but patient is trying to arrange money to afford LifeVest at home as patient is a self-pay  Case management is working on LifeVest  continue hydralazine, amlodipine, Coreg and Entresto at current dose.  Encourage p.o. intake, ambulation  Continue amiodarone.  Plavix resumed        Needs IV antibiotics until September 10th followed by 2 weeks of Keflex      Prophylaxis: Lovenox         VTE prophylaxis:     Patient condition:  Stable/Fair/Guarded/ Serious/ Critical    Anticipated discharge and Disposition:         All diagnosis and differential diagnosis have been reviewed; assessment and plan has been documented; I have personally reviewed the labs and test results that are presently available; I have reviewed the patients medication list; I have reviewed the consulting providers response and recommendations. I have reviewed or attempted to review medical records based upon their availability    All of the patient's questions have been  addressed and answered. Patient's is agreeable to the above stated plan. I will continue to monitor  closely and make adjustments to medical management as needed.    Portions of this note dictated using EMR integrated voice recognition software, and may be subject to voice recognition errors not corrected at proofreading. Please contact writer for clarification if needed.   _____________________________________________________________________    Malnutrition Status:    Scheduled Med:   amiodarone  200 mg Oral Daily    amLODIPine  10 mg Oral Daily    atorvastatin  80 mg Oral Daily    carvediloL  6.25 mg Oral BID    ceFAZolin (Ancef) IV (PEDS and ADULTS)  2 g Intravenous Q8H    clopidogreL  75 mg Oral Daily    enoxparin  40 mg Subcutaneous Q24H (prophylaxis, 1700)    hydrALAZINE  25 mg Oral Q8H    sacubitriL-valsartan  1 tablet Oral BID    sodium chloride 0.9%  10 mL Intravenous Q6H      Continuous Infusions:     PRN Meds:    Current Facility-Administered Medications:     acetaminophen, 1,000 mg, Oral, Q6H PRN    aluminum-magnesium hydroxide-simethicone, 30 mL, Oral, QID PRN    bisacodyL, 10 mg, Rectal, Daily PRN    dextromethorphan-guaiFENesin  mg/5 ml, 10 mL, Oral, Q6H PRN    hydrALAZINE, 10 mg, Intravenous, Q4H PRN    melatonin, 6 mg, Oral, Nightly PRN    ondansetron, 4 mg, Intravenous, Q4H PRN    oxyCODONE-acetaminophen, 1 tablet, Oral, Q6H PRN    prochlorperazine, 5 mg, Intravenous, Q6H PRN    senna-docusate 8.6-50 mg, 1 tablet, Oral, BID PRN    sodium chloride 0.9%, 10 mL, Intravenous, PRN    Flushing PICC/Midline Protocol, , , Until Discontinued **AND** sodium chloride 0.9%, 10 mL, Intravenous, Q6H **AND** sodium chloride 0.9%, 10 mL, Intravenous, PRN    traMADoL, 50 mg, Oral, Q6H PRN     Radiology:  I have personally reviewed the following imaging and agree with the radiologist.     X-Ray Chest 1 View for Line/Tube Placement  Narrative: EXAMINATION:  XR CHEST 1 VIEW FOR LINE/TUBE PLACEMENT    CLINICAL HISTORY:  picc placement;    COMPARISON:  23 August 2024    FINDINGS:  Frontal view of the chest was  obtained. Right PICC tip overlies the distal SVC.  The heart is not enlarged.  Lungs are clear.  There is no pneumothorax or significant effusion.  Impression: Right PICC tip overlies the distal SVC.    Electronically signed by: Carlos Georges  Date:    09/05/2024  Time:    17:31      Lauren Morales MD  Department of Hospital Medicine   [facility]   09/07/2024

## 2024-09-08 PROCEDURE — 25000003 PHARM REV CODE 250: Performed by: NURSE PRACTITIONER

## 2024-09-08 PROCEDURE — 63600175 PHARM REV CODE 636 W HCPCS: Performed by: INTERNAL MEDICINE

## 2024-09-08 PROCEDURE — 11000001 HC ACUTE MED/SURG PRIVATE ROOM

## 2024-09-08 PROCEDURE — 25000003 PHARM REV CODE 250: Performed by: PHYSICIAN ASSISTANT

## 2024-09-08 PROCEDURE — 25000003 PHARM REV CODE 250: Performed by: INTERNAL MEDICINE

## 2024-09-08 PROCEDURE — A4216 STERILE WATER/SALINE, 10 ML: HCPCS | Performed by: INTERNAL MEDICINE

## 2024-09-08 RX ADMIN — OXYCODONE HYDROCHLORIDE AND ACETAMINOPHEN 1 TABLET: 5; 325 TABLET ORAL at 09:09

## 2024-09-08 RX ADMIN — ENOXAPARIN SODIUM 40 MG: 40 INJECTION SUBCUTANEOUS at 05:09

## 2024-09-08 RX ADMIN — SODIUM CHLORIDE, PRESERVATIVE FREE 10 ML: 5 INJECTION INTRAVENOUS at 04:09

## 2024-09-08 RX ADMIN — CEFAZOLIN SODIUM 2 G: 2 SOLUTION INTRAVENOUS at 04:09

## 2024-09-08 RX ADMIN — AMIODARONE HYDROCHLORIDE 200 MG: 200 TABLET ORAL at 08:09

## 2024-09-08 RX ADMIN — SODIUM CHLORIDE, PRESERVATIVE FREE 10 ML: 5 INJECTION INTRAVENOUS at 12:09

## 2024-09-08 RX ADMIN — CEFAZOLIN SODIUM 2 G: 2 SOLUTION INTRAVENOUS at 10:09

## 2024-09-08 RX ADMIN — OXYCODONE HYDROCHLORIDE AND ACETAMINOPHEN 1 TABLET: 5; 325 TABLET ORAL at 08:09

## 2024-09-08 RX ADMIN — HYDRALAZINE HYDROCHLORIDE 25 MG: 25 TABLET ORAL at 09:09

## 2024-09-08 RX ADMIN — SACUBITRIL AND VALSARTAN 1 TABLET: 24; 26 TABLET, FILM COATED ORAL at 09:09

## 2024-09-08 RX ADMIN — OXYCODONE HYDROCHLORIDE AND ACETAMINOPHEN 1 TABLET: 5; 325 TABLET ORAL at 02:09

## 2024-09-08 RX ADMIN — HYDRALAZINE HYDROCHLORIDE 25 MG: 25 TABLET ORAL at 04:09

## 2024-09-08 RX ADMIN — CARVEDILOL 6.25 MG: 3.12 TABLET, FILM COATED ORAL at 09:09

## 2024-09-08 RX ADMIN — HYDRALAZINE HYDROCHLORIDE 25 MG: 25 TABLET ORAL at 02:09

## 2024-09-08 RX ADMIN — SACUBITRIL AND VALSARTAN 1 TABLET: 24; 26 TABLET, FILM COATED ORAL at 08:09

## 2024-09-08 RX ADMIN — AMLODIPINE BESYLATE 10 MG: 5 TABLET ORAL at 08:09

## 2024-09-08 RX ADMIN — CEFAZOLIN SODIUM 2 G: 2 SOLUTION INTRAVENOUS at 07:09

## 2024-09-08 RX ADMIN — CARVEDILOL 6.25 MG: 3.12 TABLET, FILM COATED ORAL at 08:09

## 2024-09-08 RX ADMIN — CLOPIDOGREL BISULFATE 75 MG: 75 TABLET ORAL at 08:09

## 2024-09-08 RX ADMIN — ATORVASTATIN CALCIUM 80 MG: 40 TABLET, FILM COATED ORAL at 08:09

## 2024-09-08 RX ADMIN — Medication 6 MG: at 09:09
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
## 2024-09-08 NOTE — NURSING
Nurses Note -- 4 Eyes      9/7/2024   11:48 PM      Skin assessed during: Q Shift Change      [] No Altered Skin Integrity Present    []Prevention Measures Documented      [x] Yes- Altered Skin Integrity Present or Discovered   [x] LDA Added if Not in Epic (Describe Wound)   [] New Altered Skin Integrity was Present on Admit and Documented in LDA   [x] Wound Image Taken    Wound Care Consulted? Yes    Attending Nurse:  Taj Jimenez RN/Staff Member: Tami Baumann            Patient states he just called the pharmacy and they have no record of anything from Dr Johns yet about him picking this up on Sunday, patient is concerned about getting this straightened out before the end of the day today because Dr Johns won't be here after today. Please call patient back today, he may not be able to answer the phone from his job site , patient gives permission for you to leave a detailed voicemail if he does not answer.

## 2024-09-08 NOTE — NURSING
Nurses Note -- 4 Eyes      9/8/2024   11:38 AM      Skin assessed during: Daily Assessment      [] No Altered Skin Integrity Present    []Prevention Measures Documented      [x] Yes- Altered Skin Integrity Present or Discovered   [] LDA Added if Not in Epic (Describe Wound)   [] New Altered Skin Integrity was Present on Admit and Documented in LDA   [] Wound Image Taken    Wound Care Consulted? No- already done    Attending Nurse:  Candace Jimenez RN/Staff Member:   penny

## 2024-09-08 NOTE — PROGRESS NOTES
[facility]  Hospital Medicine Progress Note        Chief Complaint: Inpatient Follow-up for     HPI: 54-year-old male with medical history of CAD/stent 2008, NSVT on amiodarone, ischemic cardiomyopathy, HTN, HLD, cardiologist Dr Arnulfo Bain who recently suffered NSTEMI and unstable VT on 7/24/2024 initially seen at [facility] ED and resuscitated/defibrillated and subsequently transferred to [facility], underwent LHC7/25/2024 by Dr. Hampton showing obstructive CAD that did not require intervention and underwent dual-chamber ICD (Biotronik) on 07/26/2024 by Dr Hussein and subsequently discharged home the next day.     Presented to [facility] ED on 08/23/2024 with complaint of shortness breath and sharp chest pain along the ICD site as well as worsening s dyspnea on exertion and orthopnea, night sweats and generalized fatigue.  His workup revealed MSSA bacteremia, transthoracic echo show LVEF 28% no evidence of vegetations on aortic or mitral valve, tricuspid and pulmonic valve not well visualized.  CTA chest show no evidence of PE, patchy bilateral ground-glass opacities suspicious for infection or pulmonary edema.  He was evaluated by Internal Medicine, Cardiology and Infectious Disease, transitioned to antibiotic with oxacillin per sensitivities.  Also received few days of IV diuresis and shortness breath has improved  Transferred to [facility] today 08/27/2024 for cardiology/PERNELL evaluation.     Upon arrival he was afebrile and hemodynamically stable saturating well on room air, in sinus rhythm, report intermittent sharp chest pain.     Pernell was negative for vegetations.  Id was consulted, empiric antibiotics were continued.  Id suggested removal of ICD.  ICD was extracted and wound VAC was inserted.  Hypertensives were adjusted during hospital stay.  Doppler of right upper extremity obtained due to complaints of pain revealed cephalic vein superficial thrombosis.  Cultures confirmed as MSSA cultures from the ICD  tip is growing MSSA also wound cultures are growing MSSA patient continues to be on oxacillin id consulted    Interval Hx:   Patient seen and examined this morning with family bedside denied any complaints  Objective/physical exam:  General: In no acute distress, afebrile  Chest: Clear to auscultation bilaterally  Heart: RRR, +S1, S2, no appreciable murmur  Abdomen: Soft, nontender, BS +  MSK: Warm, no lower extremity edema, no clubbing or cyanosis  Neurologic: Alert and oriented x4,   VITAL SIGNS: 24 HRS MIN & MAX LAST   Temp  Min: 98 °F (36.7 °C)  Max: 98.7 °F (37.1 °C) 98.7 °F (37.1 °C)   BP  Min: 127/77  Max: 154/97 132/86   Pulse  Min: 62  Max: 73  70   Resp  Min: 18  Max: 18 18   SpO2  Min: 96 %  Max: 98 % 97 %     I have reviewed the following labs:  Recent Labs   Lab 09/03/24  0531 09/06/24  0633   WBC 7.56 6.37   RBC 4.86 4.66*   HGB 14.5 14.2   HCT 43.5 42.7   MCV 89.5 91.6   MCH 29.8 30.5   MCHC 33.3 33.3   RDW 13.2 13.2   * 614*   MPV 8.5 8.7     Recent Labs   Lab 09/03/24  0531 09/06/24  0633   * 135*   K 4.1 5.0    103   CO2 22 24   BUN 8.5 10.2   CREATININE 0.98 1.14   CALCIUM 9.6 9.5   MG 1.90  --      Microbiology Results (last 7 days)       Procedure Component Value Units Date/Time    Medical Device Culture [[id number]]  (Abnormal)  (Susceptibility) Collected: 08/30/24 1209    Order Status: Completed Specimen: Foreign Body, Specify Object Updated: 09/02/24 1147     CULTURE, MEDICAL DEVICE ([facility]) More than 100 CFU/mL Methicillin Sensitive Staphylococcus aureus    Wound Culture [[id number]]  (Abnormal)  (Susceptibility) Collected: 08/30/24 1211    Order Status: Completed Specimen: Wound from Chest, Left Updated: 09/02/24 1146     Wound Culture Few Methicillin Sensitive Staphylococcus aureus    Anaerobic Culture [[id number]] Collected: 08/30/24 1211    Order Status: Completed Specimen: Wound from Chest, Left Updated: 09/02/24 0737     Anaerobe Culture No Anaerobes Isolated              See below for Radiology    Assessment/Plan:  Sepsis secondary to MSSA bacteremia and ICD infection  S/p AICD extraction 08/30/2024 and wound VAC placement  Uncontrolled hypertension     HX:  Ischemic cardiomyopathy, HFrEF 20%, CAD s/p PCI, history of VFib s/p defibrillation and ICD placement 07/26/2024, HTN, HLD     Plan:   At talked to [facility] physician Dr. Wheatley to see if we can transfer the patient there to complete IV antibiotics as [facility] can do some pain and plan but did not want to accept until Monday  Patient wants to go with the option of IV antibiotics for total of 2 weeks and followed by 2 weeks of p.o. antibiotics as recommended by ID  We have arranged for LifeVest but patient is trying to arrange money to afford LifeVest at home as patient is a self-pay  Case management is working on LifeVest  continue hydralazine, amlodipine, Coreg and Entresto at current dose.  Encourage p.o. intake, ambulation  Continue amiodarone.  Plavix resumed    At this point patient is medically stable    Needs IV antibiotics until September 10th followed by 2 weeks of Keflex      Prophylaxis: Lovenox         VTE prophylaxis:     Patient condition:  Stable/Fair/Guarded/ Serious/ Critical    Anticipated discharge and Disposition:         All diagnosis and differential diagnosis have been reviewed; assessment and plan has been documented; I have personally reviewed the labs and test results that are presently available; I have reviewed the patients medication list; I have reviewed the consulting providers response and recommendations. I have reviewed or attempted to review medical records based upon their availability    All of the patient's questions have been  addressed and answered. Patient's is agreeable to the above stated plan. I will continue to monitor closely and make adjustments to medical management as needed.    Portions of this note dictated using EMR integrated voice recognition software, and may be subject to voice  recognition errors not corrected at proofreading. Please contact writer for clarification if needed.   _____________________________________________________________________    Malnutrition Status:    Scheduled Med:   amiodarone  200 mg Oral Daily    amLODIPine  10 mg Oral Daily    atorvastatin  80 mg Oral Daily    carvediloL  6.25 mg Oral BID    ceFAZolin (Ancef) IV (PEDS and ADULTS)  2 g Intravenous Q8H    clopidogreL  75 mg Oral Daily    enoxparin  40 mg Subcutaneous Q24H (prophylaxis, 1700)    hydrALAZINE  25 mg Oral Q8H    sacubitriL-valsartan  1 tablet Oral BID    sodium chloride 0.9%  10 mL Intravenous Q6H      Continuous Infusions:     PRN Meds:    Current Facility-Administered Medications:     acetaminophen, 1,000 mg, Oral, Q6H PRN    aluminum-magnesium hydroxide-simethicone, 30 mL, Oral, QID PRN    bisacodyL, 10 mg, Rectal, Daily PRN    dextromethorphan-guaiFENesin  mg/5 ml, 10 mL, Oral, Q6H PRN    hydrALAZINE, 10 mg, Intravenous, Q4H PRN    melatonin, 6 mg, Oral, Nightly PRN    ondansetron, 4 mg, Intravenous, Q4H PRN    oxyCODONE-acetaminophen, 1 tablet, Oral, Q6H PRN    prochlorperazine, 5 mg, Intravenous, Q6H PRN    senna-docusate 8.6-50 mg, 1 tablet, Oral, BID PRN    sodium chloride 0.9%, 10 mL, Intravenous, PRN    Flushing PICC/Midline Protocol, , , Until Discontinued **AND** sodium chloride 0.9%, 10 mL, Intravenous, Q6H **AND** sodium chloride 0.9%, 10 mL, Intravenous, PRN    traMADoL, 50 mg, Oral, Q6H PRN     Radiology:  I have personally reviewed the following imaging and agree with the radiologist.     X-Ray Chest 1 View for Line/Tube Placement  Narrative: EXAMINATION:  XR CHEST 1 VIEW FOR LINE/TUBE PLACEMENT    CLINICAL HISTORY:  picc placement;    COMPARISON:  23 August 2024    FINDINGS:  Frontal view of the chest was obtained. Right PICC tip overlies the distal SVC.  The heart is not enlarged.  Lungs are clear.  There is no pneumothorax or significant effusion.  Impression: Right PICC tip  overlies the distal SVC.    Electronically signed by: Carlos Georges  Date:    09/05/2024  Time:    17:31      Lauren Morales MD  Department of Hospital Medicine   [facility]   09/08/2024

## 2024-09-09 PROCEDURE — A4216 STERILE WATER/SALINE, 10 ML: HCPCS | Performed by: INTERNAL MEDICINE

## 2024-09-09 PROCEDURE — 63600175 PHARM REV CODE 636 W HCPCS: Performed by: INTERNAL MEDICINE

## 2024-09-09 PROCEDURE — 11000001 HC ACUTE MED/SURG PRIVATE ROOM

## 2024-09-09 PROCEDURE — 25000003 PHARM REV CODE 250: Performed by: PHYSICIAN ASSISTANT

## 2024-09-09 PROCEDURE — 25000003 PHARM REV CODE 250: Performed by: NURSE PRACTITIONER

## 2024-09-09 PROCEDURE — 25000003 PHARM REV CODE 250: Performed by: INTERNAL MEDICINE

## 2024-09-09 PROCEDURE — 97605 NEG PRS WND THER DME<=50SQCM: CPT

## 2024-09-09 RX ADMIN — SODIUM CHLORIDE, PRESERVATIVE FREE 10 ML: 5 INJECTION INTRAVENOUS at 05:09

## 2024-09-09 RX ADMIN — CLOPIDOGREL BISULFATE 75 MG: 75 TABLET ORAL at 08:09

## 2024-09-09 RX ADMIN — Medication 6 MG: at 10:09

## 2024-09-09 RX ADMIN — CARVEDILOL 6.25 MG: 3.12 TABLET, FILM COATED ORAL at 09:09

## 2024-09-09 RX ADMIN — CEFAZOLIN SODIUM 2 G: 2 SOLUTION INTRAVENOUS at 04:09

## 2024-09-09 RX ADMIN — CEFAZOLIN SODIUM 2 G: 2 SOLUTION INTRAVENOUS at 11:09

## 2024-09-09 RX ADMIN — SODIUM CHLORIDE, PRESERVATIVE FREE 10 ML: 5 INJECTION INTRAVENOUS at 11:09

## 2024-09-09 RX ADMIN — OXYCODONE HYDROCHLORIDE AND ACETAMINOPHEN 1 TABLET: 5; 325 TABLET ORAL at 10:09

## 2024-09-09 RX ADMIN — ENOXAPARIN SODIUM 40 MG: 40 INJECTION SUBCUTANEOUS at 04:09

## 2024-09-09 RX ADMIN — CEFAZOLIN SODIUM 2 G: 2 SOLUTION INTRAVENOUS at 09:09

## 2024-09-09 RX ADMIN — HYDRALAZINE HYDROCHLORIDE 25 MG: 25 TABLET ORAL at 04:09

## 2024-09-09 RX ADMIN — AMLODIPINE BESYLATE 10 MG: 5 TABLET ORAL at 08:09

## 2024-09-09 RX ADMIN — ATORVASTATIN CALCIUM 80 MG: 40 TABLET, FILM COATED ORAL at 08:09

## 2024-09-09 RX ADMIN — HYDRALAZINE HYDROCHLORIDE 25 MG: 25 TABLET ORAL at 03:09

## 2024-09-09 RX ADMIN — SACUBITRIL AND VALSARTAN 1 TABLET: 24; 26 TABLET, FILM COATED ORAL at 08:09

## 2024-09-09 RX ADMIN — CARVEDILOL 6.25 MG: 3.12 TABLET, FILM COATED ORAL at 08:09

## 2024-09-09 RX ADMIN — SACUBITRIL AND VALSARTAN 1 TABLET: 24; 26 TABLET, FILM COATED ORAL at 09:09

## 2024-09-09 RX ADMIN — AMIODARONE HYDROCHLORIDE 200 MG: 200 TABLET ORAL at 08:09

## 2024-09-09 RX ADMIN — SODIUM CHLORIDE, PRESERVATIVE FREE 10 ML: 5 INJECTION INTRAVENOUS at 12:09

## 2024-09-09 RX ADMIN — HYDRALAZINE HYDROCHLORIDE 25 MG: 25 TABLET ORAL at 09:09

## 2024-09-09 RX ADMIN — OXYCODONE HYDROCHLORIDE AND ACETAMINOPHEN 1 TABLET: 5; 325 TABLET ORAL at 04:09

## 2024-09-09 NOTE — PLAN OF CARE
Patient will remain in the hospital for the remainder of his AB treatment.  Will continue to follow.

## 2024-09-09 NOTE — PROGRESS NOTES
[facility] 6th Floor Medical Telemetry  Wound Care    Patient Name:  Eliseo Denson   MRN:  [id number]  Date: 9/9/2024  Diagnosis: MSSA bacteremia    History:     Past Medical History:   Diagnosis Date    CAD (coronary artery disease)     CHF (congestive heart failure)     ICD (implantable cardioverter-defibrillator) in place     ICD (implantable cardioverter-defibrillator) infection     V-tach        Social History     Socioeconomic History    Marital status:      Social Determinants of Health     Financial Resource Strain: Medium Risk (8/28/2024)    Overall Financial Resource Strain (CARDIA)     Difficulty of Paying Living Expenses: Somewhat hard   Food Insecurity: No Food Insecurity (8/28/2024)    Hunger Vital Sign     Worried About Running Out of Food in the Last Year: Never true     Ran Out of Food in the Last Year: Never true   Transportation Needs: No Transportation Needs (8/28/2024)    TRANSPORTATION NEEDS     Transportation : No   Housing Stability: Low Risk  (8/28/2024)    Housing Stability Vital Sign     Unable to Pay for Housing in the Last Year: No     Homeless in the Last Year: No       Precautions:     Allergies as of 08/27/2024    (No Known Allergies)       WOC Assessment Details/Treatment   WOCN follow up for wound vac change. Discussed plan of care with assigned nurse as well as . Upon entry to room, patient stated that he would not be needing a wound vac change because he was leaving tomorrow and requested to speak with the . Patient did however allow me to change vac dressing. Camera malfunctioned so no updated photographs at the time of visit. Will follow up further for wound vac information as patient does not want to go home with a wound vac  09/09/2024

## 2024-09-09 NOTE — PLAN OF CARE
Rosalba with [employer] attempted to assist patient with filling out loaner wound vac.  Patient declined stating he was not going home with vac.  Will follow up at a later time.

## 2024-09-09 NOTE — PROGRESS NOTES
[facility]  Hospital Medicine Progress Note        Chief Complaint: Inpatient Follow-up for     HPI: 54-year-old male with medical history of CAD/stent 2008, NSVT on amiodarone, ischemic cardiomyopathy, HTN, HLD, cardiologist Dr Arnulfo Bain who recently suffered NSTEMI and unstable VT on 7/24/2024 initially seen at [facility] ED and resuscitated/defibrillated and subsequently transferred to [facility], underwent LHC7/25/2024 by Dr. Hampton showing obstructive CAD that did not require intervention and underwent dual-chamber ICD (Biotronik) on 07/26/2024 by Dr Hussein and subsequently discharged home the next day.     Presented to [facility] ED on 08/23/2024 with complaint of shortness breath and sharp chest pain along the ICD site as well as worsening s dyspnea on exertion and orthopnea, night sweats and generalized fatigue.  His workup revealed MSSA bacteremia, transthoracic echo show LVEF 28% no evidence of vegetations on aortic or mitral valve, tricuspid and pulmonic valve not well visualized.  CTA chest show no evidence of PE, patchy bilateral ground-glass opacities suspicious for infection or pulmonary edema.  He was evaluated by Internal Medicine, Cardiology and Infectious Disease, transitioned to antibiotic with oxacillin per sensitivities.  Also received few days of IV diuresis and shortness breath has improved  Transferred to [facility] today 08/27/2024 for cardiology/PERNELL evaluation.     Upon arrival he was afebrile and hemodynamically stable saturating well on room air, in sinus rhythm, report intermittent sharp chest pain.     Pernell was negative for vegetations.  Id was consulted, empiric antibiotics were continued.  Id suggested removal of ICD.  ICD was extracted and wound VAC was inserted.  Hypertensives were adjusted during hospital stay.  Doppler of right upper extremity obtained due to complaints of pain revealed cephalic vein superficial thrombosis.  Cultures confirmed as MSSA cultures from the ICD  tip is growing MSSA also wound cultures are growing MSSA patient continues to be on oxacillin id consulted    Interval Hx:   Patient seen and examined this morning with family bedside denied any complaints  Objective/physical exam:  General: In no acute distress, afebrile  Chest: Clear to auscultation bilaterally  Heart: RRR, +S1, S2, no appreciable murmur  Abdomen: Soft, nontender, BS +  MSK: Warm, no lower extremity edema, no clubbing or cyanosis  Neurologic: Alert and oriented x4,   VITAL SIGNS: 24 HRS MIN & MAX LAST   Temp  Min: 97.8 °F (36.6 °C)  Max: 99 °F (37.2 °C) 98.4 °F (36.9 °C)   BP  Min: 120/76  Max: 162/94 (!) 150/94   Pulse  Min: 61  Max: 78  67   Resp  Min: 18  Max: 20 20   SpO2  Min: 96 %  Max: 98 % 96 %     I have reviewed the following labs:  Recent Labs   Lab 09/03/24  0531 09/06/24  0633   WBC 7.56 6.37   RBC 4.86 4.66*   HGB 14.5 14.2   HCT 43.5 42.7   MCV 89.5 91.6   MCH 29.8 30.5   MCHC 33.3 33.3   RDW 13.2 13.2   * 614*   MPV 8.5 8.7     Recent Labs   Lab 09/03/24  0531 09/06/24  0633   * 135*   K 4.1 5.0    103   CO2 22 24   BUN 8.5 10.2   CREATININE 0.98 1.14   CALCIUM 9.6 9.5   MG 1.90  --      Microbiology Results (last 7 days)       Procedure Component Value Units Date/Time    Medical Device Culture [[id number]]  (Abnormal)  (Susceptibility) Collected: 08/30/24 1209    Order Status: Completed Specimen: Foreign Body, Specify Object Updated: 09/02/24 1147     CULTURE, MEDICAL DEVICE ([facility]) More than 100 CFU/mL Methicillin Sensitive Staphylococcus aureus    Wound Culture [[id number]]  (Abnormal)  (Susceptibility) Collected: 08/30/24 1211    Order Status: Completed Specimen: Wound from Chest, Left Updated: 09/02/24 1146     Wound Culture Few Methicillin Sensitive Staphylococcus aureus             See below for Radiology    Assessment/Plan:  Sepsis secondary to MSSA bacteremia and ICD infection  S/p AICD extraction 08/30/2024 and wound VAC placement  Uncontrolled  hypertension     HX:  Ischemic cardiomyopathy, HFrEF 20%, CAD s/p PCI, history of VFib s/p defibrillation and ICD placement 07/26/2024, HTN, HLD     Plan:   Continue with Ancef  Reviewed id is note from yesterday and discussed with the nursing staff to clarify with Infectious Diseases to make sure tomorrow will be the last day of IV antibiotics as id is note from yesterday stays October 10th to be the last day but patient wanted 2 weeks of IV antibiotics and then after that p.o.   Patient wants to go with the option of IV antibiotics for total of 2 weeks and followed by 2 weeks of p.o. antibiotics as recommended by ID before but we will clarify  We have arranged for LifeVest but patient is trying to arrange money to afford LifeVest at home as patient is a self-pay  Case management is working on LifeVest  continue hydralazine, amlodipine, Coreg and Entresto at current dose.  Encourage p.o. intake, ambulation  Continue amiodarone.  Plavix resumed    At this point patient is medically stable        Prophylaxis: Lovenox         VTE prophylaxis:     Patient condition:  Stable/Fair/Guarded/ Serious/ Critical    Anticipated discharge and Disposition:         All diagnosis and differential diagnosis have been reviewed; assessment and plan has been documented; I have personally reviewed the labs and test results that are presently available; I have reviewed the patients medication list; I have reviewed the consulting providers response and recommendations. I have reviewed or attempted to review medical records based upon their availability    All of the patient's questions have been  addressed and answered. Patient's is agreeable to the above stated plan. I will continue to monitor closely and make adjustments to medical management as needed.    Portions of this note dictated using EMR integrated voice recognition software, and may be subject to voice recognition errors not corrected at proofreading. Please contact writer  for clarification if needed.   _____________________________________________________________________    Malnutrition Status:    Scheduled Med:   amiodarone  200 mg Oral Daily    amLODIPine  10 mg Oral Daily    atorvastatin  80 mg Oral Daily    carvediloL  6.25 mg Oral BID    ceFAZolin (Ancef) IV (PEDS and ADULTS)  2 g Intravenous Q8H    clopidogreL  75 mg Oral Daily    enoxparin  40 mg Subcutaneous Q24H (prophylaxis, 1700)    hydrALAZINE  25 mg Oral Q8H    sacubitriL-valsartan  1 tablet Oral BID    sodium chloride 0.9%  10 mL Intravenous Q6H      Continuous Infusions:     PRN Meds:    Current Facility-Administered Medications:     acetaminophen, 1,000 mg, Oral, Q6H PRN    aluminum-magnesium hydroxide-simethicone, 30 mL, Oral, QID PRN    bisacodyL, 10 mg, Rectal, Daily PRN    dextromethorphan-guaiFENesin  mg/5 ml, 10 mL, Oral, Q6H PRN    hydrALAZINE, 10 mg, Intravenous, Q4H PRN    melatonin, 6 mg, Oral, Nightly PRN    ondansetron, 4 mg, Intravenous, Q4H PRN    oxyCODONE-acetaminophen, 1 tablet, Oral, Q6H PRN    prochlorperazine, 5 mg, Intravenous, Q6H PRN    senna-docusate 8.6-50 mg, 1 tablet, Oral, BID PRN    sodium chloride 0.9%, 10 mL, Intravenous, PRN    Flushing PICC/Midline Protocol, , , Until Discontinued **AND** sodium chloride 0.9%, 10 mL, Intravenous, Q6H **AND** sodium chloride 0.9%, 10 mL, Intravenous, PRN    traMADoL, 50 mg, Oral, Q6H PRN     Radiology:  I have personally reviewed the following imaging and agree with the radiologist.     X-Ray Chest 1 View for Line/Tube Placement  Narrative: EXAMINATION:  XR CHEST 1 VIEW FOR LINE/TUBE PLACEMENT    CLINICAL HISTORY:  picc placement;    COMPARISON:  23 August 2024    FINDINGS:  Frontal view of the chest was obtained. Right PICC tip overlies the distal SVC.  The heart is not enlarged.  Lungs are clear.  There is no pneumothorax or significant effusion.  Impression: Right PICC tip overlies the distal SVC.    Electronically signed by: Carlos  José Manuel  Date:    09/05/2024  Time:    17:31      Lauren Morales MD  Department of Hospital Medicine   [facility]   09/09/2024

## 2024-09-09 NOTE — PROGRESS NOTES
Inpatient Nutrition Evaluation    Admit Date: 8/27/2024   Total duration of encounter: 13 days    Nutrition Recommendation/Prescription     Diet Heart Healthy ordered  Offer sodium-free seasoning on trays per patient's request  Encouraged adequate PO intake  Monitor appetite/PO intake, weight, and labs    Nutrition Assessment     Chart Review    Reason Seen: length of stay and follow-up    Malnutrition Screening Tool Results   Have you recently lost weight without trying?: No  Have you been eating poorly because of a decreased appetite?: No   MST Score: 0     Diagnosis:  Sepsis secondary to MSSA bacteremia and ICD infection  S/p AICD extraction 08/30/2024 and wound VAC placement  Uncontrolled hypertension    Relevant Medical History:   CAD/stents 2008  [facility] 07/25/2024:  mid LAD patent stent with 30% In-stent restenosis, patent stent in ostial/proximal 3rd diagonal, LCx 100%  distal to OM2, on 2 normal with proximal 50% stenosis, om 3 distal circumflex fell via well-established right-to-left collaterals from proximal RV marginal branch, RCA diffuse 20-30% stenosis, right PDA normal with proximal 40-50% stenosis.     Ischemic cardiomyopathy-ChronicHFrEF (TTE 8/23/2024: LVEF 28%)     Nonsustained VT  Dual-chamber ICD (Biotronik, MR conditional) placement 07/26/2024  Hypertension   Hyperlipidemia    Nutrition-Related Medications:   Scheduled Meds:   amiodarone  200 mg Oral Daily    amLODIPine  10 mg Oral Daily    atorvastatin  80 mg Oral Daily    carvediloL  6.25 mg Oral BID    ceFAZolin (Ancef) IV (PEDS and ADULTS)  2 g Intravenous Q8H    clopidogreL  75 mg Oral Daily    enoxparin  40 mg Subcutaneous Q24H (prophylaxis, 1700)    hydrALAZINE  25 mg Oral Q8H    sacubitriL-valsartan  1 tablet Oral BID    sodium chloride 0.9%  10 mL Intravenous Q6H     Continuous Infusions:  PRN Meds:.  Current Facility-Administered Medications:     acetaminophen, 1,000 mg, Oral, Q6H PRN    aluminum-magnesium hydroxide-simethicone, 30 mL,  Oral, QID PRN    bisacodyL, 10 mg, Rectal, Daily PRN    dextromethorphan-guaiFENesin  mg/5 ml, 10 mL, Oral, Q6H PRN    hydrALAZINE, 10 mg, Intravenous, Q4H PRN    melatonin, 6 mg, Oral, Nightly PRN    ondansetron, 4 mg, Intravenous, Q4H PRN    oxyCODONE-acetaminophen, 1 tablet, Oral, Q6H PRN    prochlorperazine, 5 mg, Intravenous, Q6H PRN    senna-docusate 8.6-50 mg, 1 tablet, Oral, BID PRN    sodium chloride 0.9%, 10 mL, Intravenous, PRN    Flushing PICC/Midline Protocol, , , Until Discontinued **AND** sodium chloride 0.9%, 10 mL, Intravenous, Q6H **AND** sodium chloride 0.9%, 10 mL, Intravenous, PRN    traMADoL, 50 mg, Oral, Q6H PRN      Nutrition-Related Labs:  Recent Labs   Lab 09/03/24  0531 09/06/24  0633   * 135*   K 4.1 5.0   CALCIUM 9.6 9.5   MG 1.90  --     103   CO2 22 24   BUN 8.5 10.2   CREATININE 0.98 1.14   EGFRNORACEVR >60 >60   GLUCOSE 115* 135*   WBC 7.56 6.37   HGB 14.5 14.2   HCT 43.5 42.7         Diet Order: Diet Heart Healthy  Oral Supplement Order: none  Appetite/Oral Intake: good/% of meals  Factors Affecting Nutritional Intake: none identified  Food/Catholic/Cultural Preferences: none reported  Food Allergies: none reported    Retired:Skin Integrity: wound  Wound(s):      Wound 08/27/24 2000 Other (comment) Left upper Chest-Tissue loss description: Full thickness noted    Comments    9/4/2024: No significant fat/muscle wasting per NFPE. Pt reports a good appetite/PO intake prior to admit and currently. Pt reports need for sodium-free seasoning. Pt denies N/V/D/C and chewing/swallowing difficulties. Last BM documented as 9/1/2024. Per EMR, pt weighed 102.1 kg on 8/23/2024. Encouraged adequate PO intake. Will monitor.    9/9/2024: Pt reports a good appetite/PO intake and denies N/V/D/C. Last BM documented as 9/6/2024. Encouraged adequate PO intake. Will monitor.    Anthropometrics         Last Weight: 103.6 kg (228 lb 6.3 oz) (09/07/24 0557) Weight Method: Standard  Scale  BMI (Calculated): 31.9  BMI Classification: obese grade I (BMI 30-34.9)                                Usual Body Weight (UBW), k.1 kg  % Usual Body Weight: 102.07     Usual Weight Provided By: EMR weight history    Wt Readings from Last 5 Encounters:   24 103.6 kg (228 lb 6.3 oz)   24 107 kg (236 lb)   24 111.7 kg (246 lb 4.1 oz)     Weight Change(s) Since Admission:  Admit Weight: 105 kg (231 lb 8 oz) (24 2100)  2024: 104 kg  2024: 103.6 kg    Patient Education    Not applicable.    Monitoring & Evaluation     Dietitian will monitor food and beverage intake, weight, electrolyte/renal panel, glucose/endocrine profile, and gastrointestinal profile.  Nutrition Risk/Follow-Up: low (follow-up in 5-7 days)  Patients assigned 'low nutrition risk' status do not qualify for a full nutritional assessment but will be monitored and re-evaluated in a 5-7 day time period. Please consult if re-evaluation needed sooner.

## 2024-09-09 NOTE — NURSING
Nurses Note -- 4 Eyes      9/9/2024   9:34 AM      Skin assessed during: Q Shift Change      [x] No Altered Skin Integrity Present    []Prevention Measures Documented      [] Yes- Altered Skin Integrity Present or Discovered   [] LDA Added if Not in Epic (Describe Wound)   [] New Altered Skin Integrity was Present on Admit and Documented in LDA   [] Wound Image Taken    Wound Care Consulted? No    Attending Nurse:  Samantha Jimenez RN/Staff Member:   Taj

## 2024-09-09 NOTE — NURSING
Nurses Note -- 4 Eyes      9/8/2024   8:45 PM      Skin assessed during: Q Shift Change      [x] No Altered Skin Integrity Present    [x]Prevention Measures Documented      [] Yes- Altered Skin Integrity Present or Discovered   [] LDA Added if Not in Epic (Describe Wound)   [] New Altered Skin Integrity was Present on Admit and Documented in LDA   [] Wound Image Taken    Wound Care Consulted? No    Attending Nurse:  Taj Jimenez RN/Staff Member:   Tami Baumann

## 2024-09-10 VITALS
BODY MASS INDEX: 31.85 KG/M2 | OXYGEN SATURATION: 97 % | RESPIRATION RATE: 18 BRPM | WEIGHT: 228.38 LBS | HEART RATE: 59 BPM | TEMPERATURE: 99 F | DIASTOLIC BLOOD PRESSURE: 91 MMHG | SYSTOLIC BLOOD PRESSURE: 138 MMHG

## 2024-09-10 PROCEDURE — 99233 SBSQ HOSP IP/OBS HIGH 50: CPT | Mod: ,,, | Performed by: GENERAL PRACTICE

## 2024-09-10 PROCEDURE — 63600175 PHARM REV CODE 636 W HCPCS: Performed by: INTERNAL MEDICINE

## 2024-09-10 PROCEDURE — 25000003 PHARM REV CODE 250: Performed by: NURSE PRACTITIONER

## 2024-09-10 PROCEDURE — 25000003 PHARM REV CODE 250: Performed by: INTERNAL MEDICINE

## 2024-09-10 RX ORDER — CARVEDILOL 6.25 MG/1
6.25 TABLET ORAL 2 TIMES DAILY
Qty: 60 TABLET | Refills: 0 | Status: SHIPPED | OUTPATIENT
Start: 2024-09-10 | End: 2024-10-10

## 2024-09-10 RX ORDER — CLOPIDOGREL BISULFATE 75 MG/1
75 TABLET ORAL DAILY
Qty: 30 TABLET | Refills: 0 | Status: SHIPPED | OUTPATIENT
Start: 2024-09-11 | End: 2024-10-11

## 2024-09-10 RX ORDER — CEFADROXIL 500 MG/1
1000 CAPSULE ORAL EVERY 12 HOURS
Qty: 112 CAPSULE | Refills: 0 | Status: SHIPPED | OUTPATIENT
Start: 2024-09-10 | End: 2024-10-08

## 2024-09-10 RX ORDER — HYDROCODONE BITARTRATE AND ACETAMINOPHEN 5; 325 MG/1; MG/1
1 TABLET ORAL EVERY 8 HOURS PRN
Qty: 12 TABLET | Refills: 0 | Status: SHIPPED | OUTPATIENT
Start: 2024-09-10 | End: 2024-09-14

## 2024-09-10 RX ADMIN — HYDRALAZINE HYDROCHLORIDE 25 MG: 25 TABLET ORAL at 01:09

## 2024-09-10 RX ADMIN — AMLODIPINE BESYLATE 10 MG: 5 TABLET ORAL at 08:09

## 2024-09-10 RX ADMIN — OXYCODONE HYDROCHLORIDE AND ACETAMINOPHEN 1 TABLET: 5; 325 TABLET ORAL at 11:09

## 2024-09-10 RX ADMIN — ATORVASTATIN CALCIUM 80 MG: 40 TABLET, FILM COATED ORAL at 08:09

## 2024-09-10 RX ADMIN — CEFAZOLIN SODIUM 2 G: 2 SOLUTION INTRAVENOUS at 11:09

## 2024-09-10 RX ADMIN — SACUBITRIL AND VALSARTAN 1 TABLET: 24; 26 TABLET, FILM COATED ORAL at 08:09

## 2024-09-10 RX ADMIN — CEFAZOLIN SODIUM 2 G: 2 SOLUTION INTRAVENOUS at 06:09

## 2024-09-10 RX ADMIN — CEFAZOLIN SODIUM 2 G: 2 SOLUTION INTRAVENOUS at 04:09

## 2024-09-10 RX ADMIN — CLOPIDOGREL BISULFATE 75 MG: 75 TABLET ORAL at 08:09

## 2024-09-10 RX ADMIN — HYDRALAZINE HYDROCHLORIDE 25 MG: 25 TABLET ORAL at 05:09

## 2024-09-10 RX ADMIN — AMIODARONE HYDROCHLORIDE 200 MG: 200 TABLET ORAL at 08:09

## 2024-09-10 RX ADMIN — OXYCODONE HYDROCHLORIDE AND ACETAMINOPHEN 1 TABLET: 5; 325 TABLET ORAL at 05:09

## 2024-09-10 RX ADMIN — CARVEDILOL 6.25 MG: 3.12 TABLET, FILM COATED ORAL at 08:09

## 2024-09-10 NOTE — DISCHARGE INSTRUCTIONS
LIFE VEST    · What is Life Vest?  o The life vest wearable cardioverter defibrillator is worn by patients at risk of sudden cardiac death. A defibrillator is a device used to control dangerously fast heart rhythms by applying an electrical shock to the heart. While some defibrillator devices are implanted under the skin, the life vest is worn directly against the patients skin. When worn as directed, the device can provide a constant safeguard against sudden cardiac death.    · WHY DO I NEED TO WEAR THE LIFE VEST?  o Your doctor believes that your current condition puts you at risk for developing a life-threatening rapid heart rhythm, which could cause you to become unconscious and unable to call for help. This could lead to sudden cardiac death. Your doctor prescribed the Life Vest for protection from sudden cardiac death so that you can continue to be there for your family.    · HOW DOES THE LIFE VEST WORK?  o It allows you to return to most of your normal daily activities with the peace of mind that you have protection from sudden cardiac death. Life vest does not require the assistance of another person or emergency personnel for it to work. It can protect you even when you are alone.  o Life Vest is designed to detect a life-threatening rapid heart rhythm and automatically deliver a treatment shock to restore your normal rhythm. The entire event, from detecting a life-threatening rapid heartbeat to automatically delivering a shock, usually takes less than a minute.  o Life vest does not treat all heart conditions, such as dangerously slow heart rate or a complete stoppage of the heart, which are not treatable by a defibrillation shock.    · LIFE VEST ALERTS:  o VIBRATION  § Felt on your back each time the battery is charged.  § Indicates the beginning of the treatment sequence when a life-threatening rapid heartbeat is detected.  o GONG  § A low-pitched sound that repeats once every second.  § Accompanied by  a message on the monitor  § Read the message on the monitor screen to decide what you need to do.  o SIREN  § Two-toned siren starts low but increases in volume as the treatment sequence advances.  § Indicates that the Life Vest may have detected a dangerously fast heartbeat.    · WEARING THE LIFE VEST  o Wear the life vest during all normal day activities  o Only remove the Life vest for a short shower or bath  o Avoid high vibration environments.    · TIPS AND REMINDERS  o Change the battery - and charge the second battery - every day  o Wash garments every 1-2 days  o ONLY THE PATIENT SHOULD USE THE RESPONSE BUTTONS. Do not, for any reason, let anyone press the response buttons. If you press the response buttons, the patient may not receive necessary treatment.  o DO NOT TOUCH THE PATINET WHEN THERE ARE SIRENS. IF THE PATIENT IS SHOCKED, YOU CAN BE SHOCKED ALSO.  o To learn more about what you can do for support of your loved on call DANE at [phone]

## 2024-09-10 NOTE — PLAN OF CARE
09/10/24 1627   Final Note   Assessment Type Final Discharge Note   Anticipated Discharge Disposition Home-Health   Post-Acute Status   Post-Acute Authorization Home Health
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
## 2024-09-10 NOTE — PLAN OF CARE
Referrals sent to [facility]. Patient fit for lifevest. Discharge documentation sent to [facility] via [facility].

## 2024-09-10 NOTE — DISCHARGE SUMMARY
[facility] Medicine Discharge Summary    Admit Date: 8/27/2024  Discharge Date and Time: 9/10/84391:11 PM  Admitting Physician: SHAE Team  Discharging Physician: Lauren Morales MD.  Primary Care Physician: Simin, Primary Doctor  Consults: {consultation: 60097}    Discharge Diagnoses:  ***    Hospital Course:   ***  Pt was seen and examined on the day of discharge  Vitals:  VITAL SIGNS: 24 HRS MIN & MAX LAST   Temp  Min: 98.1 °F (36.7 °C)  Max: 98.5 °F (36.9 °C) 98.5 °F (36.9 °C)   BP  Min: 124/80  Max: 161/90 (!) 138/91   Pulse  Min: 56  Max: 78  (!) 59   Resp  Min: 18  Max: 20 18   SpO2  Min: 95 %  Max: 97 % 97 %       Physical Exam:  ***    Procedures Performed: No admission procedures for hospital encounter.     Significant Diagnostic Studies: See Full reports for all details    Recent Labs   Lab 09/06/24  0633   WBC 6.37   RBC 4.66*   HGB 14.2   HCT 42.7   MCV 91.6   MCH 30.5   MCHC 33.3   RDW 13.2   *   MPV 8.7       Recent Labs   Lab 09/06/24  0633   *   K 5.0      CO2 24   BUN 10.2   CREATININE 1.14   CALCIUM 9.5        Microbiology Results (last 7 days)       ** No results found for the last 168 hours. **             X-Ray Chest 1 View for Line/Tube Placement  Narrative: EXAMINATION:  XR CHEST 1 VIEW FOR LINE/TUBE PLACEMENT    CLINICAL HISTORY:  picc placement;    COMPARISON:  23 August 2024    FINDINGS:  Frontal view of the chest was obtained. Right PICC tip overlies the distal SVC.  The heart is not enlarged.  Lungs are clear.  There is no pneumothorax or significant effusion.  Impression: Right PICC tip overlies the distal SVC.    Electronically signed by: Carlos Georges  Date:    09/05/2024  Time:    17:31         Medication List        START taking these medications      carvediloL 6.25 MG tablet  Commonly known as: COREG  Take 1 tablet (6.25 mg total) by mouth 2 (two) times daily.     cefadroxil 500 MG Cap  Commonly known as: DURICEF  Take 2  capsules (1,000 mg total) by mouth every 12 (twelve) hours.     clopidogreL 75 mg tablet  Commonly known as: PLAVIX  Take 1 tablet (75 mg total) by mouth once daily.  Start taking on: September 11, 2024            CONTINUE taking these medications      amiodarone 200 MG Tab  Commonly known as: PACERONE     amLODIPine 10 MG tablet  Commonly known as: NORVASC     JAUN CHEWABLE ASPIRIN 81 mg Chew  Generic drug: aspirin     rosuvastatin 10 MG tablet  Commonly known as: CRESTOR     sacubitriL-valsartan 24-26 mg per tablet  Commonly known as: ENTRESTO  Take 1 tablet by mouth 2 (two) times daily.               Where to Get Your Medications        These medications were sent to [facility] [address]  [address], [facility] [address]      Phone: [phone]   carvediloL 6.25 MG tablet  cefadroxil 500 MG Cap  clopidogreL 75 mg tablet          Explained in detail to the patient about the discharge plan, medications, and follow-up visits. Pt understands and agrees with the treatment plan  Discharge Disposition: Short Term Hospital   Discharged Condition: stable  Diet-   Dietary Orders (From admission, onward)       Start     Ordered    08/30/24 1327  Diet Heart Healthy  Diet effective now        Comments: Please send tray now    08/30/24 1326                   Medications Per DC med rec  Activities as tolerated   Follow-up Information       Nanda Kent MD Follow up on 10/1/2024.    Specialty: Cardiothoracic Surgery  Why: @9:50  Contact information:  [address] [place] [address] [address]  [facility] [address]  [phone]               [facility] Follow up.    Why: [facility] will contact you for appointment date and time.  Contact information:  [address] [facility] [address]  [phone]               Josiah Alberto FNP Follow up on 9/26/2024.    Specialty: Infectious Diseases  Why: @11:00  Contact  information:  [address] [place] [address] [address] [place] [address]  [phone]               [facility].    Specialty: Home Health Services  Why: This is your home health agency.  Contact information:  [address] [place] [address]  [phone]                           For further questions contact hospitalist office    Discharge time 33 minutes    For worsening symptoms, chest pain, shortness of breath, increased abdominal pain, high grade fever, stroke or stroke like symptoms, immediately go to the nearest Emergency Room or call 911 as soon as possible.      Lauren [place] M.D, on 9/10/2024. at 4:11 PM.

## 2024-09-13 LAB — FUNGUS SPEC CULT: NORMAL

## 2024-09-17 ENCOUNTER — OFFICE VISIT (OUTPATIENT)
Dept: INFECTIOUS DISEASES | Facility: CLINIC | Age: 54
End: 2024-09-17

## 2024-09-17 ENCOUNTER — LAB VISIT (OUTPATIENT)
Dept: LAB | Facility: HOSPITAL | Age: 54
End: 2024-09-17

## 2024-09-17 VITALS
BODY MASS INDEX: 33.18 KG/M2 | HEART RATE: 56 BPM | HEIGHT: 71 IN | RESPIRATION RATE: 16 BRPM | TEMPERATURE: 98 F | WEIGHT: 237 LBS | DIASTOLIC BLOOD PRESSURE: 92 MMHG | SYSTOLIC BLOOD PRESSURE: 141 MMHG

## 2024-09-17 DIAGNOSIS — B95.61 MSSA BACTEREMIA: ICD-10-CM

## 2024-09-17 DIAGNOSIS — R78.81 MSSA BACTEREMIA: Primary | ICD-10-CM

## 2024-09-17 DIAGNOSIS — R78.81 MSSA BACTEREMIA: ICD-10-CM

## 2024-09-17 DIAGNOSIS — B95.61 MSSA BACTEREMIA: Primary | ICD-10-CM

## 2024-09-17 LAB
ALBUMIN SERPL-MCNC: 3.5 G/DL (ref 3.5–5)
ALBUMIN/GLOB SERPL: 0.8 RATIO (ref 1.1–2)
ALP SERPL-CCNC: 107 UNIT/L (ref 40–150)
ALT SERPL-CCNC: 21 UNIT/L (ref 0–55)
ANION GAP SERPL CALC-SCNC: 8 MEQ/L
AST SERPL-CCNC: 16 UNIT/L (ref 5–34)
BASOPHILS # BLD AUTO: 0.04 X10(3)/MCL
BASOPHILS NFR BLD AUTO: 0.8 %
BILIRUB SERPL-MCNC: 0.4 MG/DL
BUN SERPL-MCNC: 11.6 MG/DL (ref 8.4–25.7)
CALCIUM SERPL-MCNC: 9.9 MG/DL (ref 8.4–10.2)
CHLORIDE SERPL-SCNC: 107 MMOL/L (ref 98–107)
CO2 SERPL-SCNC: 24 MMOL/L (ref 22–29)
CREAT SERPL-MCNC: 1.16 MG/DL (ref 0.73–1.18)
CREAT/UREA NIT SERPL: 10
EOSINOPHIL # BLD AUTO: 0.33 X10(3)/MCL (ref 0–0.9)
EOSINOPHIL NFR BLD AUTO: 6.9 %
ERYTHROCYTE [DISTWIDTH] IN BLOOD BY AUTOMATED COUNT: 13.2 % (ref 11.5–17)
GFR SERPLBLD CREATININE-BSD FMLA CKD-EPI: >60 ML/MIN/1.73/M2
GLOBULIN SER-MCNC: 4.3 GM/DL (ref 2.4–3.5)
GLUCOSE SERPL-MCNC: 113 MG/DL (ref 74–100)
HCT VFR BLD AUTO: 45.6 % (ref 42–52)
HGB BLD-MCNC: 14.8 G/DL (ref 14–18)
IMM GRANULOCYTES # BLD AUTO: 0 X10(3)/MCL (ref 0–0.04)
IMM GRANULOCYTES NFR BLD AUTO: 0 %
LYMPHOCYTES # BLD AUTO: 1.08 X10(3)/MCL (ref 0.6–4.6)
LYMPHOCYTES NFR BLD AUTO: 22.7 %
MCH RBC QN AUTO: 30 PG (ref 27–31)
MCHC RBC AUTO-ENTMCNC: 32.5 G/DL (ref 33–36)
MCV RBC AUTO: 92.5 FL (ref 80–94)
MONOCYTES # BLD AUTO: 0.29 X10(3)/MCL (ref 0.1–1.3)
MONOCYTES NFR BLD AUTO: 6.1 %
NEUTROPHILS # BLD AUTO: 3.02 X10(3)/MCL (ref 2.1–9.2)
NEUTROPHILS NFR BLD AUTO: 63.5 %
NRBC BLD AUTO-RTO: 0 %
PLATELET # BLD AUTO: 371 X10(3)/MCL (ref 130–400)
PMV BLD AUTO: 9.3 FL (ref 7.4–10.4)
POTASSIUM SERPL-SCNC: 4.4 MMOL/L (ref 3.5–5.1)
PROT SERPL-MCNC: 7.8 GM/DL (ref 6.4–8.3)
RBC # BLD AUTO: 4.93 X10(6)/MCL (ref 4.7–6.1)
SODIUM SERPL-SCNC: 139 MMOL/L (ref 136–145)
WBC # BLD AUTO: 4.76 X10(3)/MCL (ref 4.5–11.5)

## 2024-09-17 PROCEDURE — 36415 COLL VENOUS BLD VENIPUNCTURE: CPT

## 2024-09-17 PROCEDURE — 99213 OFFICE O/P EST LOW 20 MIN: CPT | Mod: PBBFAC

## 2024-09-17 PROCEDURE — 80053 COMPREHEN METABOLIC PANEL: CPT

## 2024-09-17 PROCEDURE — 85025 COMPLETE CBC W/AUTO DIFF WBC: CPT

## 2024-09-17 PROCEDURE — 87040 BLOOD CULTURE FOR BACTERIA: CPT

## 2024-09-17 RX ORDER — OXYCODONE AND ACETAMINOPHEN 5; 325 MG/1; MG/1
1 TABLET ORAL EVERY 6 HOURS PRN
COMMUNITY
Start: 2024-07-27

## 2024-09-17 NOTE — PROGRESS NOTES
[facility]  [facility]  Clinic Note     Patient Name: Eliseo Denson  MRN: [id number]  Admission Date: (Not on file)    Reason for Consult:      Follow up MSSA bacteremia secondary to infected AICD    Subjective:     History of Present Illness:  Eliseo Denson is a 54 y.o. male CAD status post PCI x2, ischemic cardiomyopathy, hypertension, hyperlipidemia, heart failure with reduced ejection fraction (EF of 20%) who presents to the infectious disease clinic for an inpatient follow up.  Patient presented to the emergency department on 07/24 for evaluation of SVT requiring 2 pressors of adenosine which converted to ventricular tachycardia and cardiac arrest necessitating defibrillation.  Cardiac evaluation revealed severe reduction in ejection fraction which followed with transferred to [facility] for AICD placement on 07/26/2024.  He re-presented to [facility] on 08/23/2024 with main complaint of shortness a breath with associated fever, chills, and night sweats.  Blood cultures x2 drawn revealed MSSA and patient was placed on IV oxacillin.  Repeat blood cultures on 08/25/2024 continued to reveal MSSA.  Found to be negative on 08/27/2024 on repeat blood culture.  He was transferred to [facility] for cardiovascular evaluation as it was believed the AICD was the source of infection due to recent placement and findings on physical exam including warmth and drainage.  TATY on 08/28/2024 revealed ejection fraction of 15-20% without any evidence of vegetations.  AICD removed 08/30/2024.  Patient remained hospitalized for IV oxacillin 2 g q.6, however, he refused IV antibiotic therapy and opted for p.o. antibiotics.  He was discharged on 09/10/2024 on cefadroxil 1000 mg b.i.d. with planned completion date of antibiotic therapy on 10/08/2024.    Patient presented to the clinic today for a follow-up.  He reports feeling well since discharge and has not had any fever, chills, chest pain, shortness of breath, malaise, fatigue, numbness, weakness, and  tingling.  He reports that wound to the left chest is healing well and gauzes replaced daily.  Patient does have a sole LifeVest in place until AICD can be placed following treatment of MSSA.  Otherwise, patient has no complaints.  He reports compliancy with antibiotic regimen and per chart review, has 28 days of antibiotics.    Review of Systems:  Review of Systems   Constitutional:  Negative for chills and fever.   HENT:  Negative for hearing loss.    Respiratory:  Negative for cough and shortness of breath.    Cardiovascular:  Negative for chest pain.   Gastrointestinal:  Negative for abdominal pain, constipation, diarrhea, nausea and vomiting.   Genitourinary:  Negative for dysuria.   Musculoskeletal:  Negative for myalgias.   Skin:  Negative for rash.   Neurological:  Negative for dizziness, weakness and headaches.        Past Medical History: See above    Past Surgical History:  Past Surgical History:   Procedure Laterality Date    EXTRACTION, ELECTRODE LEAD N/A 8/30/2024    Procedure: EXTRACTION, ELECTRODE LEAD;  Surgeon: Nanda Kent MD;  Location: [facility];  Service: Cardiothoracic;  Laterality: N/A;  GENERATOR AND LEAD REMOVAL // (NO LASER)    REVISION OF IMPLANTABLE CARDIOVERTER-DEFIBRILLATOR (ICD) ELECTRODE LEAD PLACEMENT         Allergies:  Review of patient's allergies indicates:  No Known Allergies    Home Medications:  Prior to Admission medications    Medication Sig Start Date End Date Taking? Authorizing Provider   amiodarone (PACERONE) 200 MG Tab Take 200 mg by mouth every morning. 4/16/24  Yes Provider, Historical   carvediloL (COREG) 6.25 MG tablet Take 1 tablet (6.25 mg total) by mouth 2 (two) times daily. 9/10/24 10/10/24 Yes Lauren Morales MD   cefadroxil (DURICEF) 500 MG Cap Take 2 capsules (1,000 mg total) by mouth every 12 (twelve) hours. 9/10/24 10/8/24 Yes Lauren Morales MD   clopidogreL (PLAVIX) 75 mg tablet Take 1 tablet (75 mg total) by mouth once daily. 9/11/24  10/11/24 Yes Lauren Morales MD   oxyCODONE-acetaminophen (PERCOCET) 5-325 mg per tablet Take 1 tablet by mouth every 6 (six) hours as needed. 7/27/24  Yes Provider, Historical   amLODIPine (NORVASC) 10 MG tablet Take 10 mg by mouth once daily.  Patient not taking: Reported on 9/17/2024 4/16/24   Provider, Historical   JAUN CHEWABLE ASPIRIN 81 mg Chew Take 81 mg by mouth.  Patient not taking: Reported on 9/17/2024 4/16/24   Provider, Historical   rosuvastatin (CRESTOR) 10 MG tablet Take 10 mg by mouth once daily.  Patient not taking: Reported on 9/17/2024 4/16/24   Provider, Historical   sacubitriL-valsartan (ENTRESTO) 24-26 mg per tablet Take 1 tablet by mouth 2 (two) times daily.  Patient not taking: Reported on 9/17/2024 9/10/24 10/10/24  Lauren Morales MD     Outpatient Medications Marked as Taking for the 9/17/24 encounter (Office Visit) with RESIDENT, [facility] INFECTIOUS DISEASE   Medication Sig Dispense Refill    amiodarone (PACERONE) 200 MG Tab Take 200 mg by mouth every morning.      carvediloL (COREG) 6.25 MG tablet Take 1 tablet (6.25 mg total) by mouth 2 (two) times daily. 60 tablet 0    cefadroxil (DURICEF) 500 MG Cap Take 2 capsules (1,000 mg total) by mouth every 12 (twelve) hours. 112 capsule 0    clopidogreL (PLAVIX) 75 mg tablet Take 1 tablet (75 mg total) by mouth once daily. 30 tablet 0    oxyCODONE-acetaminophen (PERCOCET) 5-325 mg per tablet Take 1 tablet by mouth every 6 (six) hours as needed.         Family History:  No family history on file.    Social History:  Social History     Socioeconomic History    Marital status:    Tobacco Use    Smoking status: Former     Types: Cigarettes    Smokeless tobacco: Never     Social Determinants of Health     Financial Resource Strain: Medium Risk (8/28/2024)    Overall Financial Resource Strain (CARDIA)     Difficulty of Paying Living Expenses: Somewhat hard   Food Insecurity: No Food Insecurity (8/28/2024)    Hunger Vital Sign     Worried  "About Running Out of Food in the Last Year: Never true     Ran Out of Food in the Last Year: Never true   Transportation Needs: No Transportation Needs (8/28/2024)    TRANSPORTATION NEEDS     Transportation : No   Housing Stability: Low Risk  (8/28/2024)    Housing Stability Vital Sign     Unable to Pay for Housing in the Last Year: No     Homeless in the Last Year: No           Objective:   Vitals  Vitals  BP: (!) 141/92  Temp: 97.9 °F (36.6 °C)  Temp Source: Oral  Pulse: (!) 56  Resp: 16  Height: 5' 11" (180.3 cm)  Weight: 107.5 kg (236 lb 15.9 oz)    Physical Examination:  Physical Exam  Constitutional:       General: He is not in acute distress.     Appearance: Normal appearance. He is not ill-appearing, toxic-appearing or diaphoretic.   HENT:      Head: Normocephalic and atraumatic.      Nose: Nose normal.      Mouth/Throat:      Mouth: Mucous membranes are moist.      Pharynx: Oropharynx is clear. No oropharyngeal exudate or posterior oropharyngeal erythema.   Eyes:      General: No scleral icterus.     Conjunctiva/sclera: Conjunctivae normal.      Pupils: Pupils are equal, round, and reactive to light.   Cardiovascular:      Rate and Rhythm: Regular rhythm. Bradycardia present.      Pulses: Normal pulses.      Heart sounds: Heart sounds are distant. Murmur heard.      Systolic murmur is present with a grade of 4/6.      No friction rub. No gallop.   Pulmonary:      Effort: Pulmonary effort is normal. No respiratory distress.      Breath sounds: Normal breath sounds. No wheezing, rhonchi or rales.   Abdominal:      General: Bowel sounds are normal. There is no distension.      Palpations: Abdomen is soft.      Tenderness: There is no abdominal tenderness.   Musculoskeletal:         General: Normal range of motion.      Cervical back: Normal range of motion.      Right lower leg: No edema.      Left lower leg: No edema.   Skin:     General: Skin is warm.      Capillary Refill: Capillary refill takes less than 2 " seconds.      Comments: Wound with packing to the left chest, C/D/I; s/p AICD removal.   No tenderness, erythema, warmth, or drainage. No evidence of abscess   Neurological:      General: No focal deficit present.      Mental Status: He is alert and oriented to person, place, and time. Mental status is at baseline.          Assessment & Plan:     MSSA bacteremia secondary to AICD device  -device removed 8/30/2024 and was placed on IV oaxcillin until discharge from [facility] on 9/10/2024 after refusing IV abx  -currently on cefodroxil 1000mg BID started 9/10/2024 and ending 10/8/2024  -per chart review, given 48 day supply  -patient informed to call the clinic in the instance her requires refills for any reason; reports compliancy  -blood culture x1, CBC, and CMP to be done today to assess for infection  -zoll life vest in place until AICD replacement in the near future    HFrEF   CAD s/p PCI  Hx of cardiac arrest; ventricular tachycardia  -EF 15-20% ON TATY done 8/30/2025  -zoll in place until AICD replaced  -closely followed by cardiology  -currently on amiodarone, coreg, plavix, and entresto  -defer management to cardiology    HTN  -well controlled on norvasc  -141/92 in the clinic today  -vitals stable    Disposition: Patient given strict precautions to inform clinic and come to the emergency department for any signs of infection including fever, chills, night sweats.  Also informed to come to the emergency department for any chest pain or shortness for breath.  We will collect blood culture, CBC, and CMP today.  We will continue cefadroxil for full course ending 10/08/2024.  Reports having enough antibiotics at home and informed to call clinic in the instance he would require more. Continue all other medications as prescribed. Follow up in ID clinic first week of November.     David Barrett MD  Internal Medicine - PGY-2

## 2024-09-22 LAB — BACTERIA BLD CULT: NORMAL

## 2024-09-30 ENCOUNTER — DOCUMENTATION ONLY (OUTPATIENT)
Dept: INFECTIOUS DISEASES | Facility: CLINIC | Age: 54
End: 2024-09-30

## 2024-09-30 NOTE — PROGRESS NOTES
Patient walked in clinic with his female friend asking for refill of Cefadroxil. His bottle has around 16 tablets in it. Cefadroxil bottle label reads, the pharmacy still has 27 tablets for him to pickup from the original script at [facility] pharmacy.   Instructed both him and female I spoke to Ade Mendes NP, she read Dr Hurst's notes, it reads to complete the Cefadroxil on 10/8, but if he starts with fever, malaise or any symptom to report to the ER ASAP. Due to needing to start IV antibiotics. Also blood cultures is negative which is a good sign.   Female kept saying she remembers Dr. Hurst telling them that a refill will be needed after finishing this rx. Let them know per notes no refill, if needed IV would be the next step.Mr Gomes verbalized understanding to finish PO Cefadroxil, go to ER if symptoms start and keep apt on 10/31 with Dr Hurst.

## 2024-10-01 ENCOUNTER — OFFICE VISIT (OUTPATIENT)
Dept: CARDIAC SURGERY | Facility: CLINIC | Age: 54
End: 2024-10-01

## 2024-10-01 VITALS
HEART RATE: 45 BPM | OXYGEN SATURATION: 98 % | BODY MASS INDEX: 33.32 KG/M2 | HEIGHT: 71 IN | SYSTOLIC BLOOD PRESSURE: 155 MMHG | DIASTOLIC BLOOD PRESSURE: 106 MMHG | WEIGHT: 238 LBS

## 2024-10-01 DIAGNOSIS — T82.7XXS ICD (IMPLANTABLE CARDIOVERTER-DEFIBRILLATOR) INFECTION, SEQUELA: Primary | ICD-10-CM

## 2024-10-01 PROCEDURE — 99024 POSTOP FOLLOW-UP VISIT: CPT | Mod: ,,, | Performed by: STUDENT IN AN ORGANIZED HEALTH CARE EDUCATION/TRAINING PROGRAM

## 2024-10-01 NOTE — PROGRESS NOTES
Patient is status post extraction of infected ICD generator and lead doing well without complaints.   Vital signs stable/afebrile   Regular rate and rhythm without murmurs rubs or gallops  Coarse breath sounds bilaterally   Wounds CDI   A/P:  F/u as needed

## 2024-10-03 ENCOUNTER — OFFICE VISIT (OUTPATIENT)
Dept: CARDIOLOGY | Facility: CLINIC | Age: 54
End: 2024-10-03

## 2024-10-03 VITALS
WEIGHT: 239.63 LBS | OXYGEN SATURATION: 100 % | HEIGHT: 71 IN | TEMPERATURE: 99 F | RESPIRATION RATE: 18 BRPM | SYSTOLIC BLOOD PRESSURE: 156 MMHG | BODY MASS INDEX: 33.55 KG/M2 | HEART RATE: 55 BPM | DIASTOLIC BLOOD PRESSURE: 88 MMHG

## 2024-10-03 DIAGNOSIS — E78.5 HYPERLIPIDEMIA, UNSPECIFIED HYPERLIPIDEMIA TYPE: ICD-10-CM

## 2024-10-03 DIAGNOSIS — I25.10 CORONARY ARTERY DISEASE, UNSPECIFIED VESSEL OR LESION TYPE, UNSPECIFIED WHETHER ANGINA PRESENT, UNSPECIFIED WHETHER NATIVE OR TRANSPLANTED HEART: ICD-10-CM

## 2024-10-03 DIAGNOSIS — Z95.810 ICD (IMPLANTABLE CARDIOVERTER-DEFIBRILLATOR) IN PLACE: ICD-10-CM

## 2024-10-03 DIAGNOSIS — I10 HYPERTENSION, UNSPECIFIED TYPE: ICD-10-CM

## 2024-10-03 DIAGNOSIS — I50.9 CONGESTIVE HEART FAILURE, UNSPECIFIED HF CHRONICITY, UNSPECIFIED HEART FAILURE TYPE: Primary | ICD-10-CM

## 2024-10-03 DIAGNOSIS — R78.81 MSSA BACTEREMIA: ICD-10-CM

## 2024-10-03 DIAGNOSIS — B95.61 MSSA BACTEREMIA: ICD-10-CM

## 2024-10-03 PROCEDURE — 99215 OFFICE O/P EST HI 40 MIN: CPT | Mod: PBBFAC | Performed by: INTERNAL MEDICINE

## 2024-10-03 RX ORDER — SPIRONOLACTONE 25 MG/1
12.5 TABLET ORAL DAILY
Qty: 15 TABLET | Refills: 11 | Status: SHIPPED | OUTPATIENT
Start: 2024-10-03 | End: 2025-10-03

## 2024-10-03 RX ORDER — SACUBITRIL AND VALSARTAN 49; 51 MG/1; MG/1
1 TABLET, FILM COATED ORAL 2 TIMES DAILY
Qty: 180 TABLET | Refills: 3 | Status: SHIPPED | OUTPATIENT
Start: 2024-10-03 | End: 2025-10-03

## 2024-10-03 RX ORDER — ROSUVASTATIN CALCIUM 40 MG/1
40 TABLET, COATED ORAL NIGHTLY
Qty: 90 TABLET | Refills: 3 | Status: SHIPPED | OUTPATIENT
Start: 2024-10-03 | End: 2025-10-03

## 2024-10-03 NOTE — PROGRESS NOTES
Card[facility] [place] [facility]  Cardiology Clinic      Date of Visit: 10/3/2024  Reason for Visit/Chief Complaint:   Chief Complaint    Follow-up          The patient was discussed with Dr. Mac who agrees with the assessment and plan.        History of Present Illness:        Eliseo Denson is a 54 y.o. male with a PMHx HFrEF 15-20%, non-obstructive CAD,  Hx of Vtach s/p ICD placement and recent ICD infection, NSVT on amiodarone, ischemic cardiomyopathy, HTN, HLD,  Hx of massive heart attack in 2008 which resulted in 2 stent placements, who presents to cardiology clinic to establish care as a new patient.     Per chart review, patient had a NSTEMI and unstable VT on 7/24/2024 initially seen at [facility] ED and resuscitated/defibrillated and subsequently transferred to [facility], underwent [facility] 7/25/2024 showing obstructive CAD that did not require intervention and underwent dual-chamber ICD. He then presented to the ED on 08/23/2024 with complaint of shortness breath and sharp chest pain along the ICD site as well as worsening dyspnea on exertion and orthopnea, night sweats and generalized fatigue  TATY was done and was negative for vegetations. Infectious disease recommended removal of the ICD. ICD was removed and cultures confirmed MSSA cultures from the ICD tip and wound cultures. ID initially recommended 6 weeks of IV antibiotics but patient did not comply. Patient was discharged on PO cefadroxil 500mg.       Patient received a life vest on September 10th. Patient states he only has 5 more days of the cefadroxil. He has no complaints today. No chest pain, sob, RESENDIZ, orthopnea, LE or abdominal edema.     PMHx:    Past Medical History:   Diagnosis Date    CAD (coronary artery disease)     CHF (congestive heart failure)     ICD (implantable cardioverter-defibrillator) in place     ICD (implantable cardioverter-defibrillator) infection     V-tach       SurgicalHx:   Past  Surgical History:   Procedure Laterality Date    EXTRACTION, ELECTRODE LEAD N/A 8/30/2024    Procedure: EXTRACTION, ELECTRODE LEAD;  Surgeon: Nanda Kent MD;  Location: [facility] OR;  Service: Cardiothoracic;  Laterality: N/A;  GENERATOR AND LEAD REMOVAL // (NO LASER)    REVISION OF IMPLANTABLE CARDIOVERTER-DEFIBRILLATOR (ICD) ELECTRODE LEAD PLACEMENT        FamilyHx: Mom's side has extensive heart history. Unknown.   SocialHx:  reports that he has quit smoking. His smoking use included cigarettes. Quit smoking in 2010 used to smoke 1ppd. Hx of drug use but stopped 30 years ago. Occasional drinking.   Allergies: NKDA  Home Medications:    Current Outpatient Medications   Medication Sig    amiodarone (PACERONE) 200 MG Tab Take 200 mg by mouth every morning.    JAUN CHEWABLE ASPIRIN 81 mg Chew Take 81 mg by mouth.    carvediloL (COREG) 6.25 MG tablet Take 1 tablet (6.25 mg total) by mouth 2 (two) times daily.    cefadroxil (DURICEF) 500 MG Cap Take 2 capsules (1,000 mg total) by mouth every 12 (twelve) hours.    amLODIPine (NORVASC) 10 MG tablet Take 10 mg by mouth once daily. (Patient not taking: Reported on 10/3/2024)    clopidogreL (PLAVIX) 75 mg tablet Take 1 tablet (75 mg total) by mouth once daily. (Patient not taking: Reported on 10/3/2024)    oxyCODONE-acetaminophen (PERCOCET) 5-325 mg per tablet Take 1 tablet by mouth every 6 (six) hours as needed. (Patient not taking: Reported on 10/3/2024)    rosuvastatin (CRESTOR) 10 MG tablet Take 10 mg by mouth once daily. (Patient not taking: Reported on 10/3/2024)    sacubitriL-valsartan (ENTRESTO) 24-26 mg per tablet Take 1 tablet by mouth 2 (two) times daily. (Patient not taking: Reported on 10/3/2024)     No current facility-administered medications for this visit.      Current Outpatient Medications   Medication Instructions    amiodarone (PACERONE) 200 mg, Every morning    amLODIPine (NORVASC) 10 mg, Daily    JAUN CHEWABLE ASPIRIN 81 mg    carvediloL  "(COREG) 6.25 mg, Oral, 2 times daily    cefadroxil (DURICEF) 1,000 mg, Oral, Every 12 hours    clopidogreL (PLAVIX) 75 mg, Oral, Daily    oxyCODONE-acetaminophen (PERCOCET) 5-325 mg per tablet 1 tablet, Every 6 hours PRN    rosuvastatin (CRESTOR) 10 mg, Daily    sacubitriL-valsartan (ENTRESTO) 24-26 mg per tablet 1 tablet, Oral, 2 times daily                Objective:     Wt Readings from Last 3 Encounters:   10/03/24 108.7 kg (239 lb 10.2 oz)   10/01/24 108 kg (238 lb)   09/17/24 107.5 kg (236 lb 15.9 oz)     Temp Readings from Last 3 Encounters:   10/03/24 98.8 °F (37.1 °C) (Oral)   09/17/24 97.9 °F (36.6 °C) (Oral)   09/10/24 98.5 °F (36.9 °C) (Oral)     BP Readings from Last 3 Encounters:   10/03/24 (!) 174/108   10/01/24 (!) 155/106   09/17/24 (!) 141/92     Pulse Readings from Last 3 Encounters:   10/03/24 (!) 55   10/01/24 (!) 45   09/17/24 (!) 56       Vitals:    10/03/24 1459   BP: (!) 174/108   Pulse: (!) 55   Resp: 18   Temp: 98.8 °F (37.1 °C)   TempSrc: Oral   SpO2: 100%   Weight: 108.7 kg (239 lb 10.2 oz)   Height: 5' 11" (1.803 m)     Body mass index is 33.42 kg/m².    Physical Examination:  Nursing note and vital signs reviewed.   Constitutional: NAD, not ill-appearing  HEENT: NCAT, PERRLA, normal conjunctivae  Cardiovascular: RRR, no murmurs noted, no chest tenderness to palpation, normal pulses in radial   Pulmonary: normal respiratory effort, CTAB, no crackles, wheezes  Abdominal: S/NT/ND  Musculoskeletal: No edema noted to bilateral lower extremities      Neurological: Alert & oriented to self, location, time and situation. At baseline neurological function.  Skin: warm & dry.     Labs:   I have reviewed the following labs below:      CBC:  Lab Results   Component Value Date    WBC 4.76 09/17/2024    HGB 14.8 09/17/2024    HCT 45.6 09/17/2024     09/17/2024    MCV 92.5 09/17/2024    RDW 13.2 09/17/2024     BMP:  Lab Results   Component Value Date     09/17/2024    K 4.4 09/17/2024    " " 09/17/2024    CO2 24 09/17/2024    BUN 11.6 09/17/2024    CALCIUM 9.9 09/17/2024    MG 1.90 09/03/2024    PHOS 3.1 08/28/2024     LFTs:  Lab Results   Component Value Date    ALBUMIN 3.5 09/17/2024    BILITOT 0.4 09/17/2024    AST 16 09/17/2024    ALKPHOS 107 09/17/2024    ALT 21 09/17/2024     FLP:  Cholesterol   Date Value Ref Range Status   07/25/2024 207 (H) 0 - 199 mg/dL Final     HDL   Date Value Ref Range Status   07/25/2024 48 >=40 mg/dL Final     Triglycerides   Date Value Ref Range Status   07/25/2024 117 0 - 149 mg/dL Final     LDL Calculated   Date Value Ref Range Status   07/25/2024 136 (H) 0 - 129 mg/dL Final     DM:  Lab Results   Component Value Date    HGBA1C 5.7 08/23/2024    LDLCALC 136 (H) 07/25/2024    CREATININE 1.16 09/17/2024     Thyroid:  Lab Results   Component Value Date    TSH 0.093 (L) 08/23/2024     Anemia:No results found for: "IRON", "TIBC", "FERRITIN", "[id number]", "FOLATE"  Coags:  Lab Results   Component Value Date    INR 1.0 07/25/2024      Cardiac:  Lab Results   Component Value Date    TROPONINI 0.014 08/23/2024    TROPONINI 0.013 08/23/2024    TROPONINI <0.010 08/23/2024    TROPONINI <0.010 07/24/2024    .0 (H) 08/28/2024    .2 (H) 08/23/2024       Cardiovascular Studies/Imaging:   I have reviewed the following studies below:      ECG :     Echo :   Results for orders placed during the hospital encounter of 08/23/24    Echo    Interpretation Summary    Left Ventricle: The left ventricle is mildly dilated. Mildly increased wall thickness. There is severely reduced systolic function. Biplane (2D) method of discs ejection fraction is 28%.    Right Ventricle: Normal right ventricular cavity size.    Left Atrium: Left atrium is mildly dilated.    Right Atrium: Right atrium is mildly dilated.    Aortic Valve: The aortic valve is a trileaflet valve. There is no stenosis. Aortic valve peak velocity is 1.24 m/s. Mean gradient is 3 mmHg.    Mitral Valve: The mitral " valve is structurally normal. The mean pressure gradient across the mitral valve is 1 mmHg at a heart rate of 65 bpm.    Pulmonary Artery: The estimated pulmonary artery systolic pressure is 12 mmHg.    IVC/SVC: Normal venous pressure at 3 mmHg.    TATY    Left Ventricle: The left ventricle is severely dilated. Severe global hypokinesis present. Septal motion is consistent with pacing. There is severely reduced systolic function with a visually estimated ejection fraction of 15 - 20%.    Right Ventricle: Normal right ventricular cavity size. Systolic function is normal. Pacemaker lead present in the ventricle. No thombus or vegetation    Left Atrium: Left atrium is severely dilated.    Aortic Valve: There is no mass/vegetation present.    Mitral Valve: There is no mass/vegetation present. There is mild regurgitation.    Tricuspid Valve: There is no mass/vegetation present. There is mild regurgitation.    Pulmonic Valve: There is no mass/vegetation present.    Stress Testing:   No results found for this or any previous visit.       Coronary Angiogram: 7/25/2024  Moderate nonobstructive CAD with patent stent(s) mid LAD and D3 with   chronic occlusion of mid Circumflex with distal Circumflex and OM3 filling   via well established right-to-left collaterals.    Elevated LVEDP (24 mm Hg).           Images:  X-Ray Chest 1 View for Line/Tube Placement    Result Date: 9/5/2024  EXAMINATION: XR CHEST 1 VIEW FOR LINE/TUBE PLACEMENT CLINICAL HISTORY: picc placement; COMPARISON: 23 August 2024 FINDINGS: Frontal view of the chest was obtained. Right PICC tip overlies the distal SVC.  The heart is not enlarged.  Lungs are clear.  There is no pneumothorax or significant effusion.     Right PICC tip overlies the distal SVC. Electronically signed by: Carlos Georges Date:    09/05/2024 Time:    17:31           Assessment/Plan:     Hx of unstable Vtach with ICD placement  ICD infection with MSSA bacteremia   ICD extraction and currently  on life vest  Currently patient is on cefadroxil 500mg PO for 5 more days  Close follow up with ID at end of month  Will address new ICD placement once ID has evaluated the patient.     HFrEF 15-20%  Ischemic Cardiomyopathy  Patient is not taking Entresto due to the cost   Coreg 6.25 BID  Will optimize GDMT by adding Jardiance 10mg and Aldactone 12.5 mg to regimen.   Will try to prescribe/cover the Entresto 49-51 mg through the patient compensation program.    Moderate Non-obstructive CAD   with patent stents mid LAD and D3  Restart Crestor 40mg and continue ASA   Recheck Lipids annualy.     HTN   174/108 repeat 156/88  HR 55  BP log to check at home. Advised patient to call the office with reports.   Will add Entresto 49-51mg and continue coreg 6.25    HLD   with goal LDL<70  Will restart Crestor 40mg      F/U in 3 months  NV in 2 weeks for BP check       Irma Weiss, DO  Internal Medicine - PGY-1

## 2024-10-31 ENCOUNTER — LAB VISIT (OUTPATIENT)
Dept: LAB | Facility: HOSPITAL | Age: 54
End: 2024-10-31

## 2024-10-31 ENCOUNTER — OFFICE VISIT (OUTPATIENT)
Dept: INFECTIOUS DISEASES | Facility: CLINIC | Age: 54
End: 2024-10-31
Payer: MEDICAID

## 2024-10-31 VITALS
RESPIRATION RATE: 16 BRPM | SYSTOLIC BLOOD PRESSURE: 126 MMHG | HEART RATE: 53 BPM | HEIGHT: 71 IN | TEMPERATURE: 98 F | BODY MASS INDEX: 32.21 KG/M2 | DIASTOLIC BLOOD PRESSURE: 81 MMHG | WEIGHT: 230.06 LBS

## 2024-10-31 DIAGNOSIS — B95.61 MSSA BACTEREMIA: Primary | ICD-10-CM

## 2024-10-31 DIAGNOSIS — R78.81 MSSA BACTEREMIA: ICD-10-CM

## 2024-10-31 DIAGNOSIS — I50.20 HFREF (HEART FAILURE WITH REDUCED EJECTION FRACTION): ICD-10-CM

## 2024-10-31 DIAGNOSIS — R78.81 MSSA BACTEREMIA: Primary | ICD-10-CM

## 2024-10-31 DIAGNOSIS — B95.61 MSSA BACTEREMIA: ICD-10-CM

## 2024-10-31 DIAGNOSIS — Z95.810 ICD (IMPLANTABLE CARDIOVERTER-DEFIBRILLATOR) IN PLACE: ICD-10-CM

## 2024-10-31 LAB
BASOPHILS # BLD AUTO: 0.02 X10(3)/MCL
BASOPHILS NFR BLD AUTO: 0.5 %
CRP SERPL-MCNC: 1.2 MG/L
EOSINOPHIL # BLD AUTO: 0.2 X10(3)/MCL (ref 0–0.9)
EOSINOPHIL NFR BLD AUTO: 5.2 %
ERYTHROCYTE [DISTWIDTH] IN BLOOD BY AUTOMATED COUNT: 14.6 % (ref 11.5–17)
ERYTHROCYTE [SEDIMENTATION RATE] IN BLOOD: 22 MM/HR (ref 0–15)
HCT VFR BLD AUTO: 49.4 % (ref 42–52)
HGB BLD-MCNC: 16.8 G/DL (ref 14–18)
IMM GRANULOCYTES # BLD AUTO: 0.01 X10(3)/MCL (ref 0–0.04)
IMM GRANULOCYTES NFR BLD AUTO: 0.3 %
LYMPHOCYTES # BLD AUTO: 1.21 X10(3)/MCL (ref 0.6–4.6)
LYMPHOCYTES NFR BLD AUTO: 31.2 %
MCH RBC QN AUTO: 31.2 PG (ref 27–31)
MCHC RBC AUTO-ENTMCNC: 34 G/DL (ref 33–36)
MCV RBC AUTO: 91.8 FL (ref 80–94)
MONOCYTES # BLD AUTO: 0.35 X10(3)/MCL (ref 0.1–1.3)
MONOCYTES NFR BLD AUTO: 9 %
NEUTROPHILS # BLD AUTO: 2.09 X10(3)/MCL (ref 2.1–9.2)
NEUTROPHILS NFR BLD AUTO: 53.8 %
NRBC BLD AUTO-RTO: 0 %
PLATELET # BLD AUTO: 273 X10(3)/MCL (ref 130–400)
PMV BLD AUTO: 10.5 FL (ref 7.4–10.4)
RBC # BLD AUTO: 5.38 X10(6)/MCL (ref 4.7–6.1)
WBC # BLD AUTO: 3.88 X10(3)/MCL (ref 4.5–11.5)

## 2024-10-31 PROCEDURE — 87040 BLOOD CULTURE FOR BACTERIA: CPT

## 2024-10-31 PROCEDURE — 86140 C-REACTIVE PROTEIN: CPT

## 2024-10-31 PROCEDURE — 85652 RBC SED RATE AUTOMATED: CPT

## 2024-10-31 PROCEDURE — 99214 OFFICE O/P EST MOD 30 MIN: CPT | Mod: PBBFAC | Performed by: STUDENT IN AN ORGANIZED HEALTH CARE EDUCATION/TRAINING PROGRAM

## 2024-10-31 PROCEDURE — 36415 COLL VENOUS BLD VENIPUNCTURE: CPT

## 2024-10-31 PROCEDURE — 85025 COMPLETE CBC W/AUTO DIFF WBC: CPT

## 2024-11-01 LAB
ALBUMIN SERPL-MCNC: 4.1 G/DL (ref 3.5–5)
ALBUMIN/GLOB SERPL: 1.1 RATIO (ref 1.1–2)
ALP SERPL-CCNC: 94 UNIT/L (ref 40–150)
ALT SERPL-CCNC: 24 UNIT/L (ref 0–55)
ANION GAP SERPL CALC-SCNC: 7 MEQ/L
AST SERPL-CCNC: 26 UNIT/L (ref 5–34)
BILIRUB SERPL-MCNC: 0.9 MG/DL
BUN SERPL-MCNC: 12.7 MG/DL (ref 8.4–25.7)
CALCIUM SERPL-MCNC: 10.2 MG/DL (ref 8.4–10.2)
CHLORIDE SERPL-SCNC: 104 MMOL/L (ref 98–107)
CO2 SERPL-SCNC: 29 MMOL/L (ref 22–29)
CREAT SERPL-MCNC: 1.19 MG/DL (ref 0.72–1.25)
CREAT/UREA NIT SERPL: 11
GFR SERPLBLD CREATININE-BSD FMLA CKD-EPI: >60 ML/MIN/1.73/M2
GLOBULIN SER-MCNC: 3.8 GM/DL (ref 2.4–3.5)
GLUCOSE SERPL-MCNC: 93 MG/DL (ref 74–100)
POTASSIUM SERPL-SCNC: 4.6 MMOL/L (ref 3.5–5.1)
PROT SERPL-MCNC: 7.9 GM/DL (ref 6.4–8.3)
SODIUM SERPL-SCNC: 140 MMOL/L (ref 136–145)

## 2024-11-05 LAB
BACTERIA BLD CULT: NORMAL
BACTERIA BLD CULT: NORMAL

## 2024-12-11 ENCOUNTER — OFFICE VISIT (OUTPATIENT)
Dept: CARDIOLOGY | Facility: CLINIC | Age: 54
End: 2024-12-11
Payer: MEDICAID

## 2024-12-11 VITALS
BODY MASS INDEX: 32.03 KG/M2 | WEIGHT: 228.81 LBS | TEMPERATURE: 98 F | SYSTOLIC BLOOD PRESSURE: 112 MMHG | HEART RATE: 56 BPM | RESPIRATION RATE: 20 BRPM | HEIGHT: 71 IN | OXYGEN SATURATION: 96 % | DIASTOLIC BLOOD PRESSURE: 74 MMHG

## 2024-12-11 DIAGNOSIS — I50.20 HFREF (HEART FAILURE WITH REDUCED EJECTION FRACTION): ICD-10-CM

## 2024-12-11 DIAGNOSIS — I50.9 CONGESTIVE HEART FAILURE, UNSPECIFIED HF CHRONICITY, UNSPECIFIED HEART FAILURE TYPE: ICD-10-CM

## 2024-12-11 DIAGNOSIS — E78.5 HYPERLIPIDEMIA, UNSPECIFIED HYPERLIPIDEMIA TYPE: ICD-10-CM

## 2024-12-11 DIAGNOSIS — I10 HYPERTENSION, UNSPECIFIED TYPE: ICD-10-CM

## 2024-12-11 DIAGNOSIS — I42.8 NON-ISCHEMIC CARDIOMYOPATHY: Primary | ICD-10-CM

## 2024-12-11 PROBLEM — Z95.810 ICD (IMPLANTABLE CARDIOVERTER-DEFIBRILLATOR) IN PLACE: Status: RESOLVED | Noted: 2024-08-27 | Resolved: 2024-12-11

## 2024-12-11 PROCEDURE — 99214 OFFICE O/P EST MOD 30 MIN: CPT | Mod: PBBFAC | Performed by: INTERNAL MEDICINE

## 2024-12-11 RX ORDER — SACUBITRIL AND VALSARTAN 49; 51 MG/1; MG/1
1 TABLET, FILM COATED ORAL 2 TIMES DAILY
Qty: 180 TABLET | Refills: 3 | Status: SHIPPED | OUTPATIENT
Start: 2024-12-11 | End: 2025-12-11

## 2024-12-11 RX ORDER — METOPROLOL SUCCINATE 50 MG/1
50 TABLET, EXTENDED RELEASE ORAL DAILY
Qty: 90 TABLET | Refills: 3 | Status: SHIPPED | OUTPATIENT
Start: 2024-12-11 | End: 2025-12-11

## 2024-12-11 NOTE — LETTER
December 11, 2024      [facility]  [address] [address]  Phone: [phone]       Patient: Eliseo Denson   YOB: 1970  Date of Visit: 12/11/2024    To Whom It May Concern:    Melvin Denson  was at [facility] on 12/11/2024. The patient may return to work/school on 12/13/2024 with no restrictions. If you have any questions or concerns, or if I can be of further assistance, please do not hesitate to contact me.    Sincerely,    Janet Mac MD

## 2024-12-11 NOTE — PROGRESS NOTES
[facility]  Cardiology Clinic      Date of Visit: 12/11/2024  Reason for Visit/Chief Complaint: HErEF      The patient was discussed with Dr. Mac who agrees with the assessment and plan.      History of Present Illness:        Eliseo Denson is a 54 y.o. male with a PMHx HFrEF 15-20%, non-obstructive CAD,  Hx of Vtach s/p ICD placement and recent ICD infection, NSVT on amiodarone, ischemic cardiomyopathy, HTN, HLD,  Hx of massive heart attack in 2008 which resulted in 2 stent placements, who presents to cardiology clinic to establish care as a new patient.     Per chart review, patient had a NSTEMI and unstable VT on 7/24/2024 initially seen at [facility] ED and resuscitated/defibrillated and subsequently transferred to [facility], underwent [facility] 7/25/2024 showing obstructive CAD that did not require intervention and underwent dual-chamber ICD. He then presented to the ED on 08/23/2024 with complaint of shortness breath and sharp chest pain along the ICD site as well as worsening dyspnea on exertion and orthopnea, night sweats and generalized fatigue  TATY was done and was negative for vegetations. Infectious disease recommended removal of the ICD. ICD was removed and cultures confirmed MSSA cultures from the ICD tip and wound cultures. ID initially recommended 6 weeks of IV antibiotics but patient did not comply. Patient was discharged on PO cefadroxil 500mg.     Patient reports he is doing very good today. He reports he is able to be as active as he wants to be without limitations. He has walked several miles with wife recently. He has no complaints today. No chest pain, sob, RESENDIZ, orthopnea, LE or abdominal edema. He firmly declines ICD placement and stopped wearing life vest, because God told him he's got him.     PMHx:    Past Medical History:   Diagnosis Date    CAD (coronary artery disease)     CHF (congestive heart failure)     ICD (implantable  cardioverter-defibrillator) in place     ICD (implantable cardioverter-defibrillator) infection     V-tach       SurgicalHx:   Past Surgical History:   Procedure Laterality Date    EXTRACTION, ELECTRODE LEAD N/A 8/30/2024    Procedure: EXTRACTION, ELECTRODE LEAD;  Surgeon: Nanda Kent MD;  Location: [facility];  Service: Cardiothoracic;  Laterality: N/A;  GENERATOR AND LEAD REMOVAL // (NO LASER)    REVISION OF IMPLANTABLE CARDIOVERTER-DEFIBRILLATOR (ICD) ELECTRODE LEAD PLACEMENT        FamilyHx: Mom's side has extensive heart history. Unknown.   SocialHx:  reports that he has quit smoking. His smoking use included cigarettes. Quit smoking in 2010 used to smoke 1ppd. Hx of drug use but stopped 30 years ago. Occasional drinking.   Allergies: NKDA  Home Medications:    Current Outpatient Medications   Medication Sig    amiodarone (PACERONE) 200 MG Tab Take 200 mg by mouth every morning. (Patient not taking: Reported on 10/31/2024)    JAUN CHEWABLE ASPIRIN 81 mg Chew Take 81 mg by mouth.    carvediloL (COREG) 6.25 MG tablet Take 1 tablet (6.25 mg total) by mouth 2 (two) times daily.    empagliflozin (JARDIANCE) 10 mg tablet Take 1 tablet (10 mg total) by mouth once daily.    oxyCODONE-acetaminophen (PERCOCET) 5-325 mg per tablet Take 1 tablet by mouth every 6 (six) hours as needed. (Patient not taking: Reported on 10/1/2024)    rosuvastatin (CRESTOR) 40 MG Tab Take 1 tablet (40 mg total) by mouth every evening.    sacubitriL-valsartan (ENTRESTO) 49-51 mg per tablet Take 1 tablet by mouth 2 (two) times daily.    spironolactone (ALDACTONE) 25 MG tablet Take 0.5 tablets (12.5 mg total) by mouth once daily.     No current facility-administered medications for this visit.      Current Outpatient Medications   Medication Instructions    amiodarone (PACERONE) 200 mg, Every morning    JAUN CHEWABLE ASPIRIN 81 mg    carvediloL (COREG) 6.25 mg, Oral, 2 times daily    empagliflozin (JARDIANCE) 10 mg, Oral, Daily     oxyCODONE-acetaminophen (PERCOCET) 5-325 mg per tablet 1 tablet, Every 6 hours PRN    rosuvastatin (CRESTOR) 40 mg, Oral, Nightly    sacubitriL-valsartan (ENTRESTO) 49-51 mg per tablet 1 tablet, Oral, 2 times daily    spironolactone (ALDACTONE) 12.5 mg, Oral, Daily                Objective:     Wt Readings from Last 3 Encounters:   10/31/24 104.3 kg (230 lb 0.8 oz)   10/03/24 108.7 kg (239 lb 10.2 oz)   10/01/24 108 kg (238 lb)     Temp Readings from Last 3 Encounters:   10/31/24 97.8 °F (36.6 °C) (Oral)   10/03/24 98.8 °F (37.1 °C) (Oral)   09/17/24 97.9 °F (36.6 °C) (Oral)     BP Readings from Last 3 Encounters:   10/31/24 126/81   10/03/24 (!) 156/88   10/01/24 (!) 155/106     Pulse Readings from Last 3 Encounters:   10/31/24 (!) 53   10/03/24 (!) 55   10/01/24 (!) 45       There were no vitals filed for this visit.    There is no height or weight on file to calculate BMI.    Physical Examination:  Nursing note and vital signs reviewed.   Constitutional: NAD, not ill-appearing  HEENT: NCAT, PERRLA, normal conjunctivae  Cardiovascular: RRR, no murmurs noted, no chest tenderness to palpation, normal pulses in radial   Pulmonary: normal respiratory effort, CTAB, no crackles, wheezes  Abdominal: S/NT/ND  Musculoskeletal: No edema noted to bilateral lower extremities      Neurological: Alert & oriented to self, location, time and situation. At baseline neurological function.  Skin: warm & dry.     Labs:   I have reviewed the following labs below:      CBC:  Lab Results   Component Value Date    WBC 3.88 (L) 10/31/2024    HGB 16.8 10/31/2024    HCT 49.4 10/31/2024     10/31/2024    MCV 91.8 10/31/2024    RDW 14.6 10/31/2024     BMP:  Lab Results   Component Value Date     11/01/2024    K 4.6 11/01/2024     11/01/2024    CO2 29 11/01/2024    BUN 12.7 11/01/2024    CALCIUM 10.2 11/01/2024    MG 1.90 09/03/2024    PHOS 3.1 08/28/2024     LFTs:  Lab Results   Component Value Date    ALBUMIN 4.1  "11/01/2024    BILITOT 0.9 11/01/2024    AST 26 11/01/2024    ALKPHOS 94 11/01/2024    ALT 24 11/01/2024     FLP:  Cholesterol   Date Value Ref Range Status   07/25/2024 207 (H) 0 - 199 mg/dL Final     HDL   Date Value Ref Range Status   07/25/2024 48 >=40 mg/dL Final     Triglycerides   Date Value Ref Range Status   07/25/2024 117 0 - 149 mg/dL Final     LDL Calculated   Date Value Ref Range Status   07/25/2024 136 (H) 0 - 129 mg/dL Final     DM:  Lab Results   Component Value Date    HGBA1C 5.7 08/23/2024    LDLCALC 136 (H) 07/25/2024    CREATININE 1.19 11/01/2024     Thyroid:  Lab Results   Component Value Date    TSH 0.093 (L) 08/23/2024     Anemia:No results found for: "IRON", "TIBC", "FERRITIN", "TIKULLYP36", "FOLATE"  Coags:  Lab Results   Component Value Date    INR 1.0 07/25/2024      Cardiac:  Lab Results   Component Value Date    TROPONINI 0.014 08/23/2024    TROPONINI 0.013 08/23/2024    TROPONINI <0.010 08/23/2024    TROPONINI <0.010 07/24/2024    .0 (H) 08/28/2024    .2 (H) 08/23/2024       Cardiovascular Studies/Imaging:   ECG :     Echo :   Results for orders placed during the hospital encounter of 08/23/24    Echo    Interpretation Summary    Left Ventricle: The left ventricle is mildly dilated. Mildly increased wall thickness. There is severely reduced systolic function. Biplane (2D) method of discs ejection fraction is 28%.    Right Ventricle: Normal right ventricular cavity size.    Left Atrium: Left atrium is mildly dilated.    Right Atrium: Right atrium is mildly dilated.    Aortic Valve: The aortic valve is a trileaflet valve. There is no stenosis. Aortic valve peak velocity is 1.24 m/s. Mean gradient is 3 mmHg.    Mitral Valve: The mitral valve is structurally normal. The mean pressure gradient across the mitral valve is 1 mmHg at a heart rate of 65 bpm.    Pulmonary Artery: The estimated pulmonary artery systolic pressure is 12 mmHg.    IVC/SVC: Normal venous pressure at 3 " mmHg.    TATY 8/30/2024    Left Ventricle: The left ventricle is severely dilated. Severe global hypokinesis present. Septal motion is consistent with pacing. There is severely reduced systolic function with a visually estimated ejection fraction of 15 - 20%.    Right Ventricle: Normal right ventricular cavity size. Systolic function is normal. Pacemaker lead present in the ventricle. No thombus or vegetation    Left Atrium: Left atrium is severely dilated.    Aortic Valve: There is no mass/vegetation present.    Mitral Valve: There is no mass/vegetation present. There is mild regurgitation.    Tricuspid Valve: There is no mass/vegetation present. There is mild regurgitation.    Pulmonic Valve: There is no mass/vegetation present.    Stress Testing:   No results found for this or any previous visit.       Coronary Angiogram: 7/25/2024  Moderate nonobstructive CAD with patent stent(s) mid LAD and D3 with   chronic occlusion of mid Circumflex with distal Circumflex and OM3 filling   via well established right-to-left collaterals.    Elevated LVEDP (24 mm Hg).           Images:  X-Ray Chest 1 View for Line/Tube Placement    Result Date: 9/5/2024  EXAMINATION: XR CHEST 1 VIEW FOR LINE/TUBE PLACEMENT CLINICAL HISTORY: picc placement; COMPARISON: 23 August 2024 FINDINGS: Frontal view of the chest was obtained. Right PICC tip overlies the distal SVC.  The heart is not enlarged.  Lungs are clear.  There is no pneumothorax or significant effusion.     Right PICC tip overlies the distal SVC. Electronically signed by: Carlos Georges Date:    09/05/2024 Time:    17:31           Assessment/Plan:     Hx of unstable Vtach with ICD placement  ICD infection with MSSA bacteremia   ICD extraction and currently on life vest  Finished course of cefadroxil 500mg PO   ICD placement cleared by ID.   Patient counseled extensively about the two risk factors that lead to strong recommendation of ICD placement (reduced heart function and prior  shock able rhythm that necessitated defibrillation). Patients own concerns about the risks and benefits of the ICD placement were heard and discussed. Patient currently declines but was given card to call if he changes his mind at any point.    HFrEF 15-20%  Ischemic Cardiomyopathy  Patient is not taking Entresto due to the cost, but recently got insurance  Discontinue Coreg 6.25 BID. Start toprol 50mg due to decreased BP.   Started Jardiance 10mg and Aldactone 12.5 mg at last visit  Will try to prescribe/cover the Entresto 49-51 mg through the patient compensation program. Gave patient card to call office if entresto is still too expensive.   Repeat ECHO before next visit    Moderate Non-obstructive CAD   with patent stents mid LAD and D3  Continue Crestor 40mg and continue ASA   Recheck Lipids annualy.     HTN   BP at home 130s/60-70s. In clinic at goal 112/74  Add Entresto 49-51mg     HLD  Last lipid  on 07/25/2024 with goal LDL<70  Repeat lipids    F/U in 4 months    Rosalba Coronel, MS4

## 2024-12-12 NOTE — PROGRESS NOTES
"Cardiology attending attestation  Patient was seen and examined with Rosalba Coronel MS4. Agree with plan as outlined below. 55 yo M with HFrEF (probably nonischemic), previous massive MI status post stenting,  VT in July 2024 s/p ICD placement followed by MSSA bacteremia and removal of ICD who is here for follow up after recent clinic appointment with infectious diseases. He has been cleared to undergo repeat ICD implantation.  Patient declined proceeding with an ICD implantation despite extensive counseling on need for secondary prevention of SCD.  He is concerned about the risks involved and believes "God will save him".    Patient has not been taking Entresto and will reorder it at medium dose.  We will also switch Coreg to metoprolol 50 mg daily to allow more BP room.  Continue Aldactone and Jardiance.  Patient with NYHA class 1 symptoms.  Appears euvolemic on exam.  We will repeat echo in 3 months to assess for improvement in his ejection fraction.    Will also obtain a repeat lipid panel.    Janet Mac MD  Cardiology staff-[facility]  "

## 2024-12-17 ENCOUNTER — TELEPHONE (OUTPATIENT)
Dept: CARDIOLOGY | Facility: CLINIC | Age: 54
End: 2024-12-17
Payer: MEDICAID

## 2024-12-17 NOTE — TELEPHONE ENCOUNTER
----- Message from Janet Mac MD sent at 12/17/2024  2:32 PM CST -----  Ok thank you  ----- Message -----  From: Juanita Duncan LPN  Sent: 12/16/2024   1:10 PM CST  To: Janet Mac MD    [employer] life vest called and stated that patient  is wanting to discontinue life vest d/t religous reasons. They wanted to notify you.

## 2025-02-14 ENCOUNTER — HOSPITAL ENCOUNTER (EMERGENCY)
Facility: HOSPITAL | Age: 55
Discharge: HOME | End: 2025-02-14
Attending: EMERGENCY MEDICINE

## 2025-02-14 VITALS
DIASTOLIC BLOOD PRESSURE: 93 MMHG | RESPIRATION RATE: 16 BRPM | SYSTOLIC BLOOD PRESSURE: 171 MMHG | HEIGHT: 71 IN | BODY MASS INDEX: 32.72 KG/M2 | OXYGEN SATURATION: 100 % | TEMPERATURE: 98 F | HEART RATE: 61 BPM | WEIGHT: 233.69 LBS

## 2025-02-14 DIAGNOSIS — R06.02 SOB (SHORTNESS OF BREATH): Primary | ICD-10-CM

## 2025-02-14 DIAGNOSIS — I50.9 CONGESTIVE HEART FAILURE, UNSPECIFIED HF CHRONICITY, UNSPECIFIED HEART FAILURE TYPE: ICD-10-CM

## 2025-02-14 DIAGNOSIS — I10 HYPERTENSION, UNSPECIFIED TYPE: ICD-10-CM

## 2025-02-14 LAB
ALBUMIN SERPL-MCNC: 3.6 G/DL (ref 3.5–5)
ALBUMIN/GLOB SERPL: 1 RATIO (ref 1.1–2)
ALP SERPL-CCNC: 83 UNIT/L (ref 40–150)
ALT SERPL-CCNC: 20 UNIT/L (ref 0–55)
ANION GAP SERPL CALC-SCNC: 5 MEQ/L
AST SERPL-CCNC: 21 UNIT/L (ref 5–34)
BASOPHILS # BLD AUTO: 0.03 X10(3)/MCL
BASOPHILS NFR BLD AUTO: 0.5 %
BILIRUB SERPL-MCNC: 0.4 MG/DL
BNP BLD-MCNC: 253.7 PG/ML
BUN SERPL-MCNC: 12.4 MG/DL (ref 8.4–25.7)
CALCIUM SERPL-MCNC: 9.4 MG/DL (ref 8.4–10.2)
CHLORIDE SERPL-SCNC: 104 MMOL/L (ref 98–107)
CO2 SERPL-SCNC: 28 MMOL/L (ref 22–29)
CREAT SERPL-MCNC: 1.08 MG/DL (ref 0.72–1.25)
CREAT/UREA NIT SERPL: 11
EOSINOPHIL # BLD AUTO: 0.21 X10(3)/MCL (ref 0–0.9)
EOSINOPHIL NFR BLD AUTO: 3.5 %
ERYTHROCYTE [DISTWIDTH] IN BLOOD BY AUTOMATED COUNT: 13.7 % (ref 11.5–17)
GFR SERPLBLD CREATININE-BSD FMLA CKD-EPI: >60 ML/MIN/1.73/M2
GLOBULIN SER-MCNC: 3.6 GM/DL (ref 2.4–3.5)
GLUCOSE SERPL-MCNC: 105 MG/DL (ref 74–100)
HCT VFR BLD AUTO: 47.1 % (ref 42–52)
HGB BLD-MCNC: 15.5 G/DL (ref 14–18)
HOLD SPECIMEN: NORMAL
IMM GRANULOCYTES # BLD AUTO: 0.03 X10(3)/MCL (ref 0–0.04)
IMM GRANULOCYTES NFR BLD AUTO: 0.5 %
LYMPHOCYTES # BLD AUTO: 0.97 X10(3)/MCL (ref 0.6–4.6)
LYMPHOCYTES NFR BLD AUTO: 16.2 %
MCH RBC QN AUTO: 31.3 PG (ref 27–31)
MCHC RBC AUTO-ENTMCNC: 32.9 G/DL (ref 33–36)
MCV RBC AUTO: 95 FL (ref 80–94)
MONOCYTES # BLD AUTO: 0.48 X10(3)/MCL (ref 0.1–1.3)
MONOCYTES NFR BLD AUTO: 8 %
NEUTROPHILS # BLD AUTO: 4.26 X10(3)/MCL (ref 2.1–9.2)
NEUTROPHILS NFR BLD AUTO: 71.3 %
NRBC BLD AUTO-RTO: 0 %
PLATELET # BLD AUTO: 285 X10(3)/MCL (ref 130–400)
PMV BLD AUTO: 9.5 FL (ref 7.4–10.4)
POTASSIUM SERPL-SCNC: 4.2 MMOL/L (ref 3.5–5.1)
PROT SERPL-MCNC: 7.2 GM/DL (ref 6.4–8.3)
RBC # BLD AUTO: 4.96 X10(6)/MCL (ref 4.7–6.1)
SODIUM SERPL-SCNC: 137 MMOL/L (ref 136–145)
TROPONIN I SERPL-MCNC: <0.01 NG/ML (ref 0–0.04)
WBC # BLD AUTO: 5.98 X10(3)/MCL (ref 4.5–11.5)

## 2025-02-14 PROCEDURE — 80053 COMPREHEN METABOLIC PANEL: CPT

## 2025-02-14 PROCEDURE — 84484 ASSAY OF TROPONIN QUANT: CPT

## 2025-02-14 PROCEDURE — 85025 COMPLETE CBC W/AUTO DIFF WBC: CPT

## 2025-02-14 PROCEDURE — 99285 EMERGENCY DEPT VISIT HI MDM: CPT | Mod: 25

## 2025-02-14 PROCEDURE — 83880 ASSAY OF NATRIURETIC PEPTIDE: CPT

## 2025-02-14 PROCEDURE — 93005 ELECTROCARDIOGRAM TRACING: CPT

## 2025-02-14 RX ORDER — VALSARTAN 80 MG/1
80 TABLET ORAL DAILY
Qty: 30 TABLET | Refills: 0 | Status: SHIPPED | OUTPATIENT
Start: 2025-02-14 | End: 2025-02-19 | Stop reason: SDUPTHER

## 2025-02-14 NOTE — Clinical Note
"Eliseo Sun" Jarett was seen and treated in our emergency department on 2/14/2025.  He may return to work on 02/17/2025.       If you have any questions or concerns, please don't hesitate to call.      Vianca Monge, RN"

## 2025-02-14 NOTE — ED PROVIDER NOTES
Encounter Date: 2/14/2025       History     Chief Complaint   Patient presents with    Shortness of Breath     Reports SOB worse with laying flat that started yesterday. Did not take BP meds yet today. Hypertensive     Eliseo Denson 54 y.o. male with extensive cardia pmhx of CAD, CHF(EF28%), hx of ICD infection s/p removal, NSTEMI 2024, presents to the ED with complaint of new onset shortness of breath yesterday night. Reports one episode of SOB on the evening of 2/13 while he was laying down watching television. The SOB resolved last night but had a recurrent episode this morning and came to ED for evaluation. Currently denies SOB during examination. Is hypertensive due to missing morning BP meds. Was previously taking Entresto and Jardiance for CHF and seeing [facility] cardiology, ran out of those two medications 2 weeks ago due to losing insurance and cost issues. Reports compliance with other medications. Denies fever, cough, n/v, CP, SOB, abdominal pain, dysuria, leg swelling.         Review of patient's allergies indicates:  No Known Allergies  Past Medical History:   Diagnosis Date    CAD (coronary artery disease)     CHF (congestive heart failure)     ICD (implantable cardioverter-defibrillator) in place     ICD (implantable cardioverter-defibrillator) infection     V-tach      Past Surgical History:   Procedure Laterality Date    EXTRACTION, ELECTRODE LEAD N/A 8/30/2024    Procedure: EXTRACTION, ELECTRODE LEAD;  Surgeon: Nanda Kent MD;  Location: [facility];  Service: Cardiothoracic;  Laterality: N/A;  GENERATOR AND LEAD REMOVAL // (NO LASER)    REVISION OF IMPLANTABLE CARDIOVERTER-DEFIBRILLATOR (ICD) ELECTRODE LEAD PLACEMENT       No family history on file.  Social History     Tobacco Use    Smoking status: Former     Types: Cigarettes    Smokeless tobacco: Never   Substance Use Topics    Alcohol use: Yes     Alcohol/week: 1.0 standard drink of alcohol     Types: 1 Glasses of wine per week      "Comment: Occass.    Drug use: Not Currently     Types: Marijuana, Cocaine, "Crack" cocaine     Review of Systems   Constitutional:  Negative for appetite change and fever.   HENT:  Negative for congestion.    Respiratory:  Negative for cough and shortness of breath.    Cardiovascular:  Negative for chest pain and leg swelling.   Gastrointestinal:  Negative for constipation, diarrhea, nausea and vomiting.   Genitourinary:  Negative for decreased urine volume, difficulty urinating and dysuria.   Skin:  Negative for rash and wound.   Neurological:  Negative for weakness, light-headedness and headaches.       Physical Exam     Initial Vitals   BP Pulse Resp Temp SpO2   02/14/25 0931 02/14/25 0931 02/14/25 0931 02/14/25 0931 02/14/25 0928   (!) 189/109 (!) 57 18 98 °F (36.7 °C) 99 %      MAP       --                Physical Exam    Constitutional: He appears well-developed and well-nourished. No distress.   HENT:   Head: Normocephalic and atraumatic. Mouth/Throat: Oropharynx is clear and moist.   Eyes: EOM are normal. Pupils are equal, round, and reactive to light.   Neck: No JVD present.   Normal range of motion.  Cardiovascular:  Regular rhythm.           Murmur heard.  Bradycardia    Pulmonary/Chest: Breath sounds normal. No respiratory distress. He has no wheezes. He has no rales.   No crackles   Abdominal: Abdomen is soft. Bowel sounds are normal. He exhibits no distension. There is no abdominal tenderness.   Musculoskeletal:         General: No edema. Normal range of motion.      Cervical back: Normal range of motion.     Lymphadenopathy:     He has no cervical adenopathy.   Neurological: He is alert and oriented to person, place, and time. GCS score is 15. GCS eye subscore is 4. GCS verbal subscore is 5. GCS motor subscore is 6.   Skin: Skin is warm and dry. Capillary refill takes less than 2 seconds. No rash noted. No erythema.         ED Course   Procedures  Labs Reviewed   COMPREHENSIVE METABOLIC PANEL - " Abnormal       Result Value    Sodium 137      Potassium 4.2      Chloride 104      CO2 28      Glucose 105 (*)     Blood Urea Nitrogen 12.4      Creatinine 1.08      Calcium 9.4      Protein Total 7.2      Albumin 3.6      Globulin 3.6 (*)     Albumin/Globulin Ratio 1.0 (*)     Bilirubin Total 0.4      ALP 83      ALT 20      AST 21      eGFR >60      Anion Gap 5.0      BUN/Creatinine Ratio 11     B-TYPE NATRIURETIC PEPTIDE - Abnormal    Natriuretic Peptide 253.7 (*)    CBC WITH DIFFERENTIAL - Abnormal    WBC 5.98      RBC 4.96      Hgb 15.5      Hct 47.1      MCV 95.0 (*)     MCH 31.3 (*)     MCHC 32.9 (*)     RDW 13.7      Platelet 285      MPV 9.5      Neut % 71.3      Lymph % 16.2      Mono % 8.0      Eos % 3.5      Basophil % 0.5      Imm Grans % 0.5      Neut # 4.26      Lymph # 0.97      Mono # 0.48      Eos # 0.21      Baso # 0.03      Imm Gran # 0.03      NRBC% 0.0     TROPONIN I - Normal    Troponin-I <0.010     CBC W/ AUTO DIFFERENTIAL    Narrative:     The following orders were created for panel order CBC Auto Differential.  Procedure                               Abnormality         Status                     ---------                               -----------         ------                     CBC with Differential[[id number]]       Abnormal            Final result                 Please view results for these tests on the individual orders.   EXTRA TUBES    Narrative:     The following orders were created for panel order EXTRA TUBES.  Procedure                               Abnormality         Status                     ---------                               -----------         ------                     Light Blue Top Hold[[id number]]                             In process                 Gold Top Hold[[id number]]                                   In process                 Pink Top Hold[[id number]]                                   In process                   Please view results for these tests  on the individual orders.   LIGHT BLUE TOP HOLD   GOLD TOP HOLD   PINK TOP HOLD     EKG Readings: (Independently Interpreted)   Initial Reading: No STEMI. Rhythm: Sinus Bradycardia. Axis: Right Axis Deviation.   Bradycardia at 56bpm     ECG Results              EKG 12-lead (Shortness of Breath) Age > 50 (In process)        Collection Time Result Time QRS Duration OHS QTC Calculation    02/14/25 09:23:16 02/14/25 09:40:02 114 424                     In process by Interface, Lab In [facility] (02/14/25 09:40:06)                   Narrative:    Test Reason : R06.02,    Vent. Rate :  56 BPM     Atrial Rate :  56 BPM     P-R Int : 202 ms          QRS Dur : 114 ms      QT Int : 440 ms       P-R-T Axes :  49  94 -21 degrees    QTcB Int : 424 ms    Sinus bradycardia  Rightward axis  LVH with repolarization abnormality  Possible Inferior infarct Abnormal ECG  When compared with ECG of 24-Aug-2024 06:53,  Significant changes have occurred    Referred By:            Confirmed By:                                   Imaging Results              X-Ray Chest 1 View (Final result)  Result time 02/14/25 10:24:08      Final result by Jean-Claude Cramer MD (02/14/25 10:24:08)                   Impression:      No acute chest disease is identified.      Electronically signed by: Jean-Claude Cramer  Date:    02/14/2025  Time:    10:24               Narrative:    EXAMINATION:  XR CHEST 1 VIEW    CLINICAL HISTORY:  Shortness of breath;, .    COMPARISON:  September 5, 2024    FINDINGS:  No alveolar consolidation, effusion, or pneumothorax is seen.   The thoracic aorta is normal  cardiac silhouette is at the upper limits of normal, central pulmonary vessels and mediastinum are normal in size and are grossly unremarkable.   visualized osseous structures are grossly unremarkable.                                       Medications - No data to display  Medical Decision Making  Patient present for workup of intermittent SOB that started last night,  one episode this AM but no current chest pain or SOB during ED visit. Stopped taking Entresto and Jardiance 2 weeks prior due to losing insurance. Will bridge with Valsartan 80mg daily. Has Cardiology visit on 12/19/2025. CBC/CMP wnl, BNP mildly elevated at 253, troponin normal. Chest x-ray with no acute cardiopulmonary abnormalities. Results discussed with patient and plan of care discussed, patient questions and concerns addressed. ED precautions discussed with return to ED with new onset chest pain, worsening SOB, or lower extremity edema.     Amount and/or Complexity of Data Reviewed  Labs: ordered.  Radiology: ordered.    Risk  Prescription drug management.      Additional MDM:   Differential Diagnosis:   CHF exacerbation, MI, Pneumonia, Chest pain, Pneumothorax, Allergic reaction                                    Clinical Impression:  Final diagnoses:  [R06.02] SOB (shortness of breath) (Primary)  [I10] Hypertension, unspecified type          ED Disposition Condition    Discharge Stable          ED Prescriptions       Medication Sig Dispense Start Date End Date Auth. Provider    valsartan (DIOVAN) 80 MG tablet Take 1 tablet (80 mg total) by mouth once daily. 30 tablet 2/14/2025 3/16/2025 Zahida Calero MD          Follow-up Information       Follow up With Specialties Details Why Contact Info    [facility] Cardiology Go on 2/19/2025  [facility] [address] [phone]    [facility] Emergency Dept Emergency Medicine  As needed [address] [place] [id number] [phone]    Carlos To MD Internal Medicine Go on 2/27/2025 Go to new patient appointment with Dr. To on 2/27/2025 [facility] [address]  [phone]               Zahida Calero MD  Resident  02/14/25 1111

## 2025-02-14 NOTE — Clinical Note
"Eliseo Sun"Jarett was seen and treated in our emergency department on 2/14/2025.  He may return to work on 02/16/2025.       If you have any questions or concerns, please don't hesitate to call.      Zahida Calero MD"

## 2025-02-17 LAB
OHS QRS DURATION: 114 MS
OHS QTC CALCULATION: 424 MS

## 2025-02-19 ENCOUNTER — OFFICE VISIT (OUTPATIENT)
Dept: CARDIOLOGY | Facility: CLINIC | Age: 55
End: 2025-02-19

## 2025-02-19 VITALS
DIASTOLIC BLOOD PRESSURE: 98 MMHG | HEART RATE: 56 BPM | BODY MASS INDEX: 32.48 KG/M2 | HEIGHT: 71 IN | OXYGEN SATURATION: 100 % | WEIGHT: 232 LBS | RESPIRATION RATE: 20 BRPM | SYSTOLIC BLOOD PRESSURE: 156 MMHG | TEMPERATURE: 98 F

## 2025-02-19 DIAGNOSIS — I25.5 ISCHEMIC CARDIOMYOPATHY: ICD-10-CM

## 2025-02-19 DIAGNOSIS — I50.9 CONGESTIVE HEART FAILURE, UNSPECIFIED HF CHRONICITY, UNSPECIFIED HEART FAILURE TYPE: ICD-10-CM

## 2025-02-19 DIAGNOSIS — B95.61 MSSA BACTEREMIA: ICD-10-CM

## 2025-02-19 DIAGNOSIS — E78.5 HYPERLIPIDEMIA LDL GOAL <70: Primary | ICD-10-CM

## 2025-02-19 DIAGNOSIS — I42.8 NON-ISCHEMIC CARDIOMYOPATHY: ICD-10-CM

## 2025-02-19 DIAGNOSIS — I10 HYPERTENSION, UNSPECIFIED TYPE: ICD-10-CM

## 2025-02-19 DIAGNOSIS — I50.20 HFREF (HEART FAILURE WITH REDUCED EJECTION FRACTION): ICD-10-CM

## 2025-02-19 DIAGNOSIS — R78.81 MSSA BACTEREMIA: ICD-10-CM

## 2025-02-19 PROCEDURE — 99214 OFFICE O/P EST MOD 30 MIN: CPT | Mod: PBBFAC | Performed by: INTERNAL MEDICINE

## 2025-02-19 RX ORDER — METOPROLOL SUCCINATE 50 MG/1
50 TABLET, EXTENDED RELEASE ORAL DAILY
Qty: 30 TABLET | Refills: 11 | Status: SHIPPED | OUTPATIENT
Start: 2025-02-19 | End: 2026-02-19

## 2025-02-19 RX ORDER — NITROGLYCERIN 0.4 MG/1
0.4 TABLET SUBLINGUAL EVERY 5 MIN PRN
Qty: 30 TABLET | Refills: 3 | Status: SHIPPED | OUTPATIENT
Start: 2025-02-19 | End: 2026-02-19

## 2025-02-19 RX ORDER — VALSARTAN 160 MG/1
160 TABLET ORAL DAILY
Qty: 30 TABLET | Refills: 1 | Status: SHIPPED | OUTPATIENT
Start: 2025-02-19 | End: 2025-03-21

## 2025-02-19 NOTE — LETTER
February 19, 2025      [facility]  [facility] [address]  Phone: [phone]       Patient: Eliseo Denson   YOB: 1970  Date of Visit: 02/19/2025    To Whom It May Concern:    Melvin Denson  was at [facility] on 02/19/2025. The patient may return to work/school on 02/21/2025 with no restrictions. If you have any questions or concerns, or if I can be of further assistance, please do not hesitate to contact me.    Sincerely,    Janet Mac MD

## 2025-02-19 NOTE — PROGRESS NOTES
[facility]  Outpatient Cardiology Clinic    Chief Complaint: Follow-up (Denies chest pains) and Shortness of Breath (Occass. Even at rest)                  HPI:  Eliseo Denson 54 y.o. male with HFrEF (probably ischemic and nonischemic), previous massive MI status post stenting,  VT in July 2024 s/p ICD placement followed by MSSA bacteremia and removal of ICD with no subsequent ICD who is here for follow up.     Pt ran out of insurance (medicaid) and has not been able to refill entresto and  and jardiance. On 2/14/2025 he presented to the ED with SOB and orthopnea. He was hypertensive and was started on valsartan 80mg with plan to follow up with cardiology.  Today, he reports feeling better but reports mild SOB and orthopnea at times. He also has occasional left sided chest pain that lasts about 1 minute and improves with rubbing his chest. It seems his pain is above his previous ICD site. He denies lower ext edema.                                                                                                                                                                                                                                                                                                                                                                                                                                                                             CARDIAC TESTING:  Results for orders placed during the hospital encounter of 08/23/24    Echo    Interpretation Summary    Left Ventricle: The left ventricle is mildly dilated. Mildly increased wall thickness. There is severely reduced systolic function. Biplane (2D) method of discs ejection fraction is 28%.    Right Ventricle: Normal right ventricular cavity size.    Left Atrium: Left atrium is mildly dilated.    Right Atrium: Right atrium is mildly dilated.    Aortic Valve: The aortic valve is a trileaflet valve. There is no stenosis. Aortic valve  "peak velocity is 1.24 m/s. Mean gradient is 3 mmHg.    Mitral Valve: The mitral valve is structurally normal. The mean pressure gradient across the mitral valve is 1 mmHg at a heart rate of 65 bpm.    Pulmonary Artery: The estimated pulmonary artery systolic pressure is 12 mmHg.    IVC/SVC: Normal venous pressure at 3 mmHg.    No results found for this or any previous visit.     No results found for this or any previous visit.       Problem List[1]  Past Surgical History:   Procedure Laterality Date    EXTRACTION, ELECTRODE LEAD N/A 8/30/2024    Procedure: EXTRACTION, ELECTRODE LEAD;  Surgeon: Nanda Kent MD;  Location: [facility];  Service: Cardiothoracic;  Laterality: N/A;  GENERATOR AND LEAD REMOVAL // (NO LASER)    REVISION OF IMPLANTABLE CARDIOVERTER-DEFIBRILLATOR (ICD) ELECTRODE LEAD PLACEMENT       Social History[2]     No family history on file.  Review of patient's allergies indicates:  No Known Allergies      ROS:                                                                                                                                                                             Negative except as stated in the history of present illness. See HPI for details.    PHYSICAL EXAM:  Visit Vitals  BP (!) 156/106 (BP Location: Right arm, Patient Position: Sitting)   Pulse (!) 56   Temp 98.2 °F (36.8 °C) (Oral)   Resp 20   Ht 5' 11" (1.803 m)   Wt 105.2 kg (232 lb)   SpO2 100%   BMI 32.36 kg/m²       General: alert and oriented/no acute distress  Eye: EOMI/normal conjunctiva/no xanthelasma  HENT: normocephalic/moist oral mucosa  Neck: supple/nontender/no carotid bruit  Respiratory: lungs CTA/nonlabored respirations/BS equal/symmetrical expansion/no  chest wall tenderness  Cardiovascular: normal rate/normal rhythm/no murmur/normal peripheral perfusion/no  edema/no JVD  Gastrointestinal: soft/nontender  Musculoskeletal: normal ROM  Integumentary: warm/dry/pink/intact  Neurologic: alert/oriented/normal " "sensory/no focal deficits  Psychiatric: cooperative/appropriate mood and affect/normal judgment    Current Outpatient Medications   Medication Instructions    amiodarone (PACERONE) 200 mg, Every morning    JAUN CHEWABLE ASPIRIN 81 mg    empagliflozin (JARDIANCE) 10 mg, Oral, Daily    metoprolol succinate (TOPROL-XL) 50 mg, Oral, Daily    oxyCODONE-acetaminophen (PERCOCET) 5-325 mg per tablet 1 tablet, Every 6 hours PRN    rosuvastatin (CRESTOR) 40 mg, Oral, Nightly    sacubitriL-valsartan (ENTRESTO) 49-51 mg per tablet 1 tablet, Oral, 2 times daily    spironolactone (ALDACTONE) 12.5 mg, Oral, Daily    valsartan (DIOVAN) 80 mg, Oral, Daily        All medications, laboratory studies, cardiac diagnostic imaging independently reviewed.     Lab Results   Component Value Date    TRIG 117 07/25/2024    CREATININE 1.08 02/14/2025    MG 1.90 09/03/2024    K 4.2 02/14/2025        ASSESSMENT/PLAN:    HFrEF/ischemic and nonischemic cardiomyopathy  NYHA class II - euvolemic on exam  Pt worse since running out of entresto. Will increase valsartan to 160mg and have pt apply to the PAP. He will need entresto 49/51mg bid. If PAP not approved, will switch him to lisinopril.   Will send jardiance to our pharmacy as cost is significantly lower and will be affordable per pt.  Continue aldactone 12.5mg and toprol 50mg daily  Can consider loop diuretic if pt worsens but appears euvolemic today  Repeat echo already scheduled in March     VT in 7/2025  Pt had episode of VT in 7/2025. At the time coronary angiogram showed  of LCX distal to OM2 and otherwise patent LAD and D3 stents and nonobstructive disease  He then underwent ICD implantation but ended up with MSSA bacteremia and subsequent removal of his ICD.  In the past I have had extensive discussions with the patient regarding the need for reimplantation of an ICD but patient was concerned about associated risks and stated "God will save her".  He continues to be of the same " "opinion today   Continue Toprol 50 mg and amiodarone 200 mg daily.    Moderate Non-obstructive CAD   with patent stents mid LAD and D3  Patient having chest pain that appears to be musculoskeletal but will provide him with sublingual nitroglycerin.  If he develops relief with it can consider addition of isosorbide   Continue aspirin and rosuvastatin 40 mg   Patient is due for a lipid panel    HTN  BP significantly elevated at 156/106.  Patient has been out of his Entresto but he was started on valsartan 80 mg daily.  We will increase it to 160 mg and apply to PAP as above.                   [1]   Patient Active Problem List  Diagnosis    Congestive heart failure    MSSA bacteremia    HFrEF (heart failure with reduced ejection fraction)    Non-ischemic cardiomyopathy    Hypertension   [2]   Social History  Socioeconomic History    Marital status:    Tobacco Use    Smoking status: Former     Types: Cigarettes    Smokeless tobacco: Never   Substance and Sexual Activity    Alcohol use: Yes     Alcohol/week: 1.0 standard drink of alcohol     Types: 1 Glasses of wine per week     Comment: Occass.    Drug use: Not Currently     Types: Marijuana, Cocaine, "Crack" cocaine     Social Drivers of Health     Financial Resource Strain: Medium Risk (8/28/2024)    Overall Financial Resource Strain (CARDIA)     Difficulty of Paying Living Expenses: Somewhat hard   Food Insecurity: No Food Insecurity (8/28/2024)    Hunger Vital Sign     Worried About Running Out of Food in the Last Year: Never true     Ran Out of Food in the Last Year: Never true   Transportation Needs: No Transportation Needs (8/28/2024)    TRANSPORTATION NEEDS     Transportation : No   Housing Stability: Unknown (8/28/2024)    Housing Stability Vital Sign     Unable to Pay for Housing in the Last Year: No     Homeless in the Last Year: No     " swelling around pacemaker site  placed on 6/27

## 2025-02-26 DIAGNOSIS — I50.9 CONGESTIVE HEART FAILURE, UNSPECIFIED HF CHRONICITY, UNSPECIFIED HEART FAILURE TYPE: ICD-10-CM

## 2025-02-27 RX ORDER — SACUBITRIL AND VALSARTAN 49; 51 MG/1; MG/1
1 TABLET, FILM COATED ORAL 2 TIMES DAILY
Qty: 180 TABLET | Refills: 3 | Status: SHIPPED | OUTPATIENT
Start: 2025-02-27 | End: 2026-02-27

## 2025-02-27 RX ORDER — METOPROLOL SUCCINATE 50 MG/1
50 TABLET, EXTENDED RELEASE ORAL DAILY
Qty: 90 TABLET | Refills: 3 | Status: SHIPPED | OUTPATIENT
Start: 2025-02-27 | End: 2026-02-27

## 2025-02-27 RX ORDER — NITROGLYCERIN 0.4 MG/1
0.4 TABLET SUBLINGUAL EVERY 5 MIN PRN
Qty: 30 TABLET | Refills: 3 | Status: SHIPPED | OUTPATIENT
Start: 2025-02-27 | End: 2026-02-27

## (undated) DEVICE — BOWL STERILE LARGE 32OZ

## (undated) DEVICE — KIT SURGICAL TURNOVER

## (undated) DEVICE — GOWN SMARTSLEEVE AAMI LVL4 XXL

## (undated) DEVICE — ELECTRODE PATIENT RETURN DISP

## (undated) DEVICE — BLANKET HYPER ADULT 24X60IN

## (undated) DEVICE — GLOVE PROTEXIS HYDROGEL SZ6.5

## (undated) DEVICE — DRAPE STERI INCISE 23X23IN

## (undated) DEVICE — ELECTRODE PROPAD MULTI W/10FT

## (undated) DEVICE — GLOVE PROTEXIS PI SYN SURG 7

## (undated) DEVICE — GLOVE BIOGEL 7.5

## (undated) DEVICE — DRESSING INFOVAC SMALL BLK

## (undated) DEVICE — SPONGE LAP 18X18 PREWASHED

## (undated) DEVICE — BANDAGE ADHESIVE FABRIC 2X4

## (undated) DEVICE — STOPCOCK 4-WAY

## (undated) DEVICE — SOL NACL IRR 1000ML BTL

## (undated) DEVICE — KIT BRIDGE ACCESSORY

## (undated) DEVICE — SWAB CULTURETTE SINGLE

## (undated) DEVICE — SPONGE GAUZE 16PLY 4X4

## (undated) DEVICE — SUT VICRYL 2-0 36 CT-1

## (undated) DEVICE — INTRODUCER CATH 5F 11CM

## (undated) DEVICE — GLOVE PROTEXIS BLUE LATEX 7

## (undated) DEVICE — SUT SILK BLK BR. 2 2-60

## (undated) DEVICE — CULTSWAB+ AMIES W/O CHARC SNG

## (undated) DEVICE — DRAPE C-ARM COVER EZ 36X28IN

## (undated) DEVICE — DRESSING TELFA + BARR 4X6IN

## (undated) DEVICE — KIT CATHGARD DBL LUMN 9FRX11.5

## (undated) DEVICE — CANISTER INFOV.A.C WOUND 500ML

## (undated) DEVICE — GLOVE PROTEXIS PI SYN SURG 7.5

## (undated) DEVICE — SUT MONOCRYL PLUS UD 3-0 27

## (undated) DEVICE — COVER PROBE US 5.5X58L NON LTX

## (undated) DEVICE — TRAY CATH FOL SIL URIMTR 16FR

## (undated) DEVICE — Device

## (undated) DEVICE — KIT WRENCH